# Patient Record
Sex: FEMALE | Employment: FULL TIME | URBAN - METROPOLITAN AREA
[De-identification: names, ages, dates, MRNs, and addresses within clinical notes are randomized per-mention and may not be internally consistent; named-entity substitution may affect disease eponyms.]

---

## 2017-03-01 ENCOUNTER — OFFICE VISIT (OUTPATIENT)
Dept: OBGYN | Facility: CLINIC | Age: 47
End: 2017-03-01
Attending: OBSTETRICS & GYNECOLOGY
Payer: COMMERCIAL

## 2017-03-01 VITALS
SYSTOLIC BLOOD PRESSURE: 113 MMHG | HEART RATE: 66 BPM | HEIGHT: 63 IN | BODY MASS INDEX: 23.48 KG/M2 | WEIGHT: 132.5 LBS | DIASTOLIC BLOOD PRESSURE: 70 MMHG

## 2017-03-01 DIAGNOSIS — R14.0 ABDOMINAL BLOATING: Primary | ICD-10-CM

## 2017-03-01 PROCEDURE — 99212 OFFICE O/P EST SF 10 MIN: CPT | Mod: ZF

## 2017-03-01 NOTE — PROGRESS NOTES
"[facility] Visit    CC: abdominal bloating    S: 47 year old here for evaluation of abdominal bloating and pain recently. She has a history of endometriosis and is worried about recurrence of ovarian cysts. Denies pain, just bloating and cramping. She also has some bowel irregularity, occasional loose stools, but BM daily. She has h/o Dmoxi-Aknxgtjohq-Vgetao-[place] syndrome s/p THOMAS of small uterus, had no cervix and slightly shortened vagina. Has intact ovaries but has been on vivelle dot for hot flushes for past 2 years. Changes patch every 5-7 days rather than twice a week without symptoms. Was also recently diagnosed with osteoporosis and started calcium and vitamin D.       O: /70 (BP Location: Right arm, Patient Position: Chair)  Pulse 66  Ht 1.607 m (5' 3.25\")  Wt 60.1 kg (132 lb 8 oz)  BMI 23.29 kg/m2  General: No distress  Abdomen: Soft, non-tender, non-distended, no masses  Pelvic Exam:  Vulva: No external lesions, normal hair distribution, normal architecture  Vagina: Moist, pink, no abnormal discharge, well rugated, no lesions  Adnexa: No masses palpated    A:47 year old with abdominal bloating    P: Pelvic US  Stool culture  Will start probiotics  Follow up with results      Nanette Blanton MD FACOG  "

## 2017-03-01 NOTE — MR AVS SNAPSHOT
After Visit Summary   3/1/2017    Shannan Harris    MRN: [id number]           Patient Information     Date Of Birth          1970        Visit Information        Provider Department      3/1/2017 3:00 PM Nanette Blanton MD [facility]        Today's Diagnoses     Abdominal bloating    -  1       Follow-ups after your visit        Your next 10 appointments already scheduled     Mar 03, 2017 10:30 AM CST   ULTRASOUND with [facility] ULTRASOUND   [facility] ([facility])    [facility] [address]  [address]  [address]  [facility] [id number] [phone]              Future tests that were ordered for you today     Open Future Orders        Priority Expected Expires Ordered    Ova and Parasite Exam Routine Routine  3/1/2018 3/1/2017    US GYN Complete Transvaginal - 53615 (In Clinic) Routine  6/29/2017 3/1/2017            Who to contact     Please call your clinic at [phone] to:    Ask questions about your health    Make or cancel appointments    Discuss your medicines    Learn about your test results    Speak to your doctor   If you have compliments or concerns about an experience at your clinic, or if you wish to file a complaint, please contact [facility] Patient Relations at [phone] or email us at [email or web address]         Additional Information About Your Visit        MyChart Information     [employer] gives you secure access to your electronic health record. If you see a primary care provider, you can also send messages to your care team and make appointments. If you have questions, please call your primary care clinic.  If you do not have a primary care provider, please call [phone] and they will assist you.      [employer] is an electronic gateway that provides easy, online access to your medical records. With [employer], you can request a clinic appointment, read your  "test results, renew a prescription or communicate with your care team.     To access your existing account, please contact your [facility] or call [phone] for assistance.        Care EveryWhere ID     This is your Care EveryWhere ID. This could be used by other organizations to access your [place] medical records  [id number]        Your Vitals Were     Pulse Height BMI (Body Mass Index)             66 1.607 m (5' 3.25\") 23.29 kg/m2          Blood Pressure from Last 3 Encounters:   03/01/17 113/70   12/09/16 110/67   08/11/16 92/60    Weight from Last 3 Encounters:   03/01/17 60.1 kg (132 lb 8 oz)   12/09/16 56.2 kg (124 lb)   11/11/16 55.8 kg (123 lb)               Primary Care Provider    No Ref-Primary Verified       No address on file        Thank you!     Thank you for choosing [facility]  for your care. Our goal is always to provide you with excellent care. Hearing back from our patients is one way we can continue to improve our services. Please take a few minutes to complete the written survey that you may receive in the mail after your visit with us. Thank you!             Your Updated Medication List - Protect others around you: Learn how to safely use, store and throw away your medicines at www.disposemymeds.org.          This list is accurate as of: 3/1/17  3:42 PM.  Always use your most recent med list.                   Brand Name Dispense Instructions for use    estradiol 0.05 MG/24HR BIW patch    VIVELLE-DOT    24 patch    Place 1 patch onto the skin twice a week       order for DME     1 Units    Roll-A-Bout Walker. Patient can use for 2 months         "

## 2017-03-01 NOTE — LETTER
"3/1/2017       RE: Shannan Harris  [address]  [facility] [address]     Dear Colleague,    Thank you for referring your patient, Shannan Harris, to the [facility] at [facility]. Please see a copy of my visit note below.    [facility] Visit    CC: abdominal bloating    S: 47 year old here for evaluation of abdominal bloating and pain recently. She has a history of endometriosis and is worried about recurrence of ovarian cysts. Denies pain, just bloating and cramping. She also has some bowel irregularity, occasional loose stools, but BM daily. She has h/o Zjufa-Lajqtxxxih-Blcpfo-[place]iar[place] syndrome s/p THOMAS of small uterus, had no cervix and slightly shortened vagina. Has intact ovaries but has been on vivelle dot for hot flushes for past 2 years. Changes patch every 5-7 days rather than twice a week without symptoms. Was also recently diagnosed with osteoporosis and started calcium and vitamin D.       O: /70 (BP Location: Right arm, Patient Position: Chair)  Pulse 66  Ht 1.607 m (5' 3.25\")  Wt 60.1 kg (132 lb 8 oz)  BMI 23.29 kg/m2  General: No distress  Abdomen: Soft, non-tender, non-distended, no masses  Pelvic Exam:  Vulva: No external lesions, normal hair distribution, normal architecture  Vagina: Moist, pink, no abnormal discharge, well rugated, no lesions  Adnexa: No masses palpated    A:47 year old with abdominal bloating    P: Pelvic US  Stool culture  Will start probiotics  Follow up with results      Nanette Blanton MD FACOG    "

## 2017-03-01 NOTE — NURSING NOTE
Chief Complaint   Patient presents with     Follow Up For     abd pain; hx of endometriosis       See ASHLEY Jules 3/1/2017

## 2017-03-03 ENCOUNTER — OFFICE VISIT (OUTPATIENT)
Dept: OBGYN | Facility: CLINIC | Age: 47
End: 2017-03-03
Attending: OBSTETRICS & GYNECOLOGY
Payer: COMMERCIAL

## 2017-03-03 DIAGNOSIS — R14.0 ABDOMINAL BLOATING: ICD-10-CM

## 2017-03-03 DIAGNOSIS — R14.0 ABDOMINAL BLOATING: Primary | ICD-10-CM

## 2017-03-03 PROCEDURE — 87209 SMEAR COMPLEX STAIN: CPT | Performed by: OBSTETRICS & GYNECOLOGY

## 2017-03-03 PROCEDURE — 76830 TRANSVAGINAL US NON-OB: CPT

## 2017-03-03 PROCEDURE — 87177 OVA AND PARASITES SMEARS: CPT | Performed by: OBSTETRICS & GYNECOLOGY

## 2017-03-03 NOTE — PROGRESS NOTES
47 year old female  presents for gynecologic ultrasound indicated by cramping, bloating, h/o endometriosis.  This study was done both transabdominally and transvaginally.    Uterine findings:   Presence: surgically absent      Pelvic findings:    Right Adnexa: Normal   Left Adnexa: Normal   Bladder:  Normal         Cul - de - sac fluid: None    Ovarian follicles:   Right ovary:  Not visualized       Left ovary:  1.9x2.2x1.0cm.     0 follicles      Comments:  Normal pelvic ultrasound, given patient's history.  R ovary not well visualized.    JOELLE Cespedes MD MPH

## 2017-03-03 NOTE — LETTER
3/3/2017       RE: Shannan Harris  [address]  [place] [facility] [address]     Dear Colleague,    Thank you for referring your patient, Shannan Harris, to the [facility] at [facility]. Please see a copy of my visit note below.    47 year old female  presents for gynecologic ultrasound indicated by cramping, bloating, h/o endometriosis.  This study was done both transabdominally and transvaginally.    Uterine findings:   Presence: surgically absent      Pelvic findings:    Right Adnexa: Normal   Left Adnexa: Normal   Bladder:  Normal         Cul - de - sac fluid: None    Ovarian follicles:   Right ovary:  Not visualized       Left ovary:  1.9x2.2x1.0cm.     0 follicles      Comments:  Normal pelvic ultrasound, given patient's history.  R ovary not well visualized.    Cammy Rubio RDMS  Angelika Higginbotham MD MPH      Again, thank you for allowing me to participate in the care of your patient.      Sincerely,    [facility]

## 2017-03-03 NOTE — MR AVS SNAPSHOT
After Visit Summary   3/3/2017    Shannan Harris    MRN: [id number]           Patient Information     Date Of Birth          1970        Visit Information        Provider Department      3/3/2017 10:30 AM [facility] ULTRASOUND [facility]        Today's Diagnoses     Abdominal bloating    -  1       Follow-ups after your visit        Who to contact     Please call your clinic at [phone] to:    Ask questions about your health    Make or cancel appointments    Discuss your medicines    Learn about your test results    Speak to your doctor   If you have compliments or concerns about an experience at your clinic, or if you wish to file a complaint, please contact [facility] Patient Relations at [phone] or email us at [email or web address]         Additional Information About Your Visit        MyChart Information     [employer] gives you secure access to your electronic health record. If you see a primary care provider, you can also send messages to your care team and make appointments. If you have questions, please call your primary care clinic.  If you do not have a primary care provider, please call [phone] and they will assist you.      [employer] is an electronic gateway that provides easy, online access to your medical records. With [employer] you can request a clinic appointment, read your test results, renew a prescription or communicate with your care team.     To access your existing account, please contact your [facility] or call [phone] for assistance.        Care EveryWhere ID     This is your Care EveryWhere ID. This could be used by other organizations to access your [place] medical records  [id number]         Blood Pressure from Last 3 Encounters:   03/01/17 113/70   12/09/16 110/67   08/11/16 92/60    Weight from Last 3 Encounters:   03/01/17 60.1 kg (132 lb 8 oz)   12/09/16 56.2 kg (124  lb)   11/11/16 55.8 kg (123 lb)              Today, you had the following     No orders found for display       Primary Care Provider    No Ref-Primary Verified       No address on file        Thank you!     Thank you for choosing [facility]  for your care. Our goal is always to provide you with excellent care. Hearing back from our patients is one way we can continue to improve our services. Please take a few minutes to complete the written survey that you may receive in the mail after your visit with us. Thank you!             Your Updated Medication List - Protect others around you: Learn how to safely use, store and throw away your medicines at www.disposemymeds.org.          This list is accurate as of: 3/3/17  2:29 PM.  Always use your most recent med list.                   Brand Name Dispense Instructions for use    estradiol 0.05 MG/24HR BIW patch    VIVELLE-DOT    24 patch    Place 1 patch onto the skin twice a week       order for DME     1 Units    Roll-A-Bout Walker. Patient can use for 2 months

## 2017-03-06 LAB
MICRO REPORT STATUS: ABNORMAL
O+P STL MICRO: ABNORMAL
SPECIMEN SOURCE: ABNORMAL

## 2017-05-30 ENCOUNTER — TELEPHONE (OUTPATIENT)
Dept: INTERNAL MEDICINE | Facility: CLINIC | Age: 47
End: 2017-05-30

## 2017-05-30 NOTE — TELEPHONE ENCOUNTER
**Vaccinated for measles?  YES       **Internations travel in the past 30 days: NO    Where to?  **Exposure to measles: NO    Who:    When:  **Do you work in or go to a day care center? NO  **Symptoms:      Fever YES  - How long?  - How high?  - What other symptoms along with the fever?    Rash NO  - How long?  - Unusual for you?  - Exposed to any new fabric or plants or laundry detergent etc?  - What does it look like?  - Where did it start? (concerning if started on the scalp)    Runny nose/Red eyes  NO  - How long?  - Usual for you?  - Seasonal or chemical exposure?    Sore throat YES  - How long?  **Other considerations?NONE    Recommendation:  NEG    If negative: Continue scheduling -  takes the call back    If positive: Contact XOCHILT Olivera (I.P. M-F 7-4:30, Aimee after 4:30 & W/E)  - I.P. -  [phone]  - Aimee - [phone]

## 2017-05-31 ENCOUNTER — OFFICE VISIT (OUTPATIENT)
Dept: INTERNAL MEDICINE | Facility: CLINIC | Age: 47
End: 2017-05-31

## 2017-05-31 VITALS
DIASTOLIC BLOOD PRESSURE: 67 MMHG | OXYGEN SATURATION: 97 % | BODY MASS INDEX: 22.67 KG/M2 | TEMPERATURE: 97.9 F | SYSTOLIC BLOOD PRESSURE: 109 MMHG | WEIGHT: 129 LBS | HEART RATE: 78 BPM

## 2017-05-31 DIAGNOSIS — J01.01 ACUTE RECURRENT MAXILLARY SINUSITIS: ICD-10-CM

## 2017-05-31 DIAGNOSIS — R09.81 NASAL CONGESTION: Primary | ICD-10-CM

## 2017-05-31 RX ORDER — TRIAMCINOLONE ACETONIDE 55 UG/1
2 SPRAY, METERED NASAL DAILY
Qty: 1 BOTTLE | Refills: 3 | Status: SHIPPED | OUTPATIENT
Start: 2017-05-31 | End: 2017-06-06

## 2017-05-31 ASSESSMENT — PAIN SCALES - GENERAL: PAINLEVEL: NO PAIN (0)

## 2017-05-31 NOTE — MR AVS SNAPSHOT
After Visit Summary   5/31/2017    Shannan Harris    MRN: [id number]           Patient Information     Date Of Birth          1970        Visit Information        Provider Department      5/31/2017 10:00 AM Karina Silva APRN CNP [facility]        Today's Diagnoses     Nasal congestion    -  1    Acute recurrent maxillary sinusitis           Follow-ups after your visit        Follow-up notes from your care team     Return for Physical Exam.      Your next 10 appointments already scheduled     Jun 06, 2017  1:30 PM CDT   (Arrive by 1:15 PM)   PHYSICAL with AMANDA Perez CNP   [facility] ([facility])    [address]  [address] [facility] [address] [phone]              Who to contact     Please call your clinic at [phone] to:    Ask questions about your health    Make or cancel appointments    Discuss your medicines    Learn about your test results    Speak to your doctor   If you have compliments or concerns about an experience at your clinic, or if you wish to file a complaint, please contact [facility] Patient Relations at [phone] or email us at [email or web address]         Additional Information About Your Visit        MyChart Information     [employer] gives you secure access to your electronic health record. If you see a primary care provider, you can also send messages to your care team and make appointments. If you have questions, please call your primary care clinic.  If you do not have a primary care provider, please call [phone] and they will assist you.      [employer] is an electronic gateway that provides easy, online access to your medical records. With [employer], you can request a clinic appointment, read your test results, renew a prescription or communicate with your care team.     To access your existing account, please contact your  [facility] or call [phone] for assistance.        Care EveryWhere ID     This is your Care EveryWhere ID. This could be used by other organizations to access your [place] medical records  [id number]        Your Vitals Were     Pulse Temperature Pulse Oximetry Breastfeeding? BMI (Body Mass Index)       78 97.9  F (36.6  C) (Oral) 97% No 22.67 kg/m2        Blood Pressure from Last 3 Encounters:   05/31/17 109/67   03/01/17 113/70   12/09/16 110/67    Weight from Last 3 Encounters:   05/31/17 58.5 kg (129 lb)   03/01/17 60.1 kg (132 lb 8 oz)   12/09/16 56.2 kg (124 lb)              Today, you had the following     No orders found for display         Today's Medication Changes          These changes are accurate as of: 5/31/17 10:37 AM.  If you have any questions, ask your nurse or doctor.               Start taking these medicines.        Dose/Directions    amoxicillin-clavulanate 875-125 MG per tablet   Commonly known as:  AUGMENTIN   Used for:  Acute recurrent maxillary sinusitis   Started by:  Karina Silva APRN CNP        Dose:  1 tablet   Take 1 tablet by mouth 2 times daily   Quantity:  20 tablet   Refills:  0       triamcinolone 55 MCG/ACT Inhaler   Commonly known as:  NASACORT AQ   Used for:  Nasal congestion   Started by:  Karina Silva APRN CNP        Dose:  2 spray   Spray 2 sprays into both nostrils daily   Quantity:  1 Bottle   Refills:  3            Where to get your medicines      These medications were sent to [facility] [address] [place] [address] [address] AT [facility]  [address], [facility] [address]     Phone:  [phone]     amoxicillin-clavulanate 875-125 MG per tablet    triamcinolone 55 MCG/ACT Inhaler                Primary Care Provider    No Ref-Primary Verified       No address on file        Thank you!     Thank you for choosing [facility]  for your care. Our goal is  always to provide you with excellent care. Hearing back from our patients is one way we can continue to improve our services. Please take a few minutes to complete the written survey that you may receive in the mail after your visit with us. Thank you!             Your Updated Medication List - Protect others around you: Learn how to safely use, store and throw away your medicines at www.disposemymeds.org.          This list is accurate as of: 5/31/17 10:37 AM.  Always use your most recent med list.                   Brand Name Dispense Instructions for use    amoxicillin-clavulanate 875-125 MG per tablet    AUGMENTIN    20 tablet    Take 1 tablet by mouth 2 times daily       estradiol 0.05 MG/24HR BIW patch    VIVELLE-DOT    24 patch    Place 1 patch onto the skin twice a week       order for DME     1 Units    Roll-A-Bout Walker. Patient can use for 2 months       triamcinolone 55 MCG/ACT Inhaler    NASACORT AQ    1 Bottle    Spray 2 sprays into both nostrils daily

## 2017-05-31 NOTE — NURSING NOTE
Chief Complaint   Patient presents with     URI     Patient here for cold and sore throat,     Mechelle Marinelli LPN at 10:13 AM on 5/31/2017.

## 2017-05-31 NOTE — PROGRESS NOTES
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 She was sick with similar symptoms <2 weeks ago. Takes Vitamins and Calcium, but otherwise just nasal sprays (Zicam with eucalyptus and Afrin at night).   She has body aches and chills, but no measured fever. Pressure in the sinuses, constant drainage, and sore throat is \"killing me.\" Concerned that viral illness has turned into bacterial. Started taking Cipro (left over from something a long time ago--took 500 mg then 250 mg for 2 days). Has had to use sick days this year (has not had to do this in past 15 years).   3-year-old son is in . He is not sick.  She has a hx of recurrent sore throats as a child, was hospitalized once due to complications when the infection spread into her chest. She does not remember any additional details about this.    Other issues discussed today:     Patient Active Problem List   Diagnosis     Melanocytic nevus     SK (seborrheic keratosis)     History of basal cell carcinoma     Hot flashes     Neoplasm of uncertain behavior of skin     Tinea pedis of both feet     Nondisplaced fracture of fifth left metatarsal bone with routine healing     Left foot pain       Current Outpatient Prescriptions   Medication Sig Dispense Refill     triamcinolone (NASACORT AQ) 55 MCG/ACT Inhaler Spray 2 sprays into both nostrils daily 1 Bottle 3     amoxicillin-clavulanate (AUGMENTIN) 875-125 MG per tablet Take 1 tablet by mouth 2 times daily 20 tablet 0     estradiol (VIVELLE-DOT) 0.05 MG/24HR patch Place 1 patch onto the skin twice a week 24 patch 3     order for DME Roll-A-Bout Walker. Patient can use for 2 months 1 Units 0         ALLERGIES: Nkda [no known drug allergies]    PAST MEDICAL HX:   Past Medical History:   Diagnosis Date     Hot flashes        PAST SURGICAL HX:   Past Surgical History:   Procedure " Laterality Date     BIOPSY OF SKIN LESION       HYSTERECTOMY      has ovaries       IMMUNIZATION HX:   Immunization History   Administered Date(s) Administered     Influenza (H1N1) 12/21/2009     Influenza (IIV3) 10/28/2012, 10/08/2013, 10/01/2014       SOCIAL HX:   Social History     Social History Narrative    Medical researcher at [employer]       ROS:   CONSTITUTIONAL:see HPI  EYES: no acute vision problems or changes  ENT:see HPI  RESP: no significant cough, no shortness of breath  CV: no chest pain, no palpitations, no new or worsening peripheral edema  GI: no nausea, no vomiting, no constipation, no diarrhea    OBJECTIVE:  /67  Pulse 78  Temp 97.9  F (36.6  C) (Oral)  Wt 58.5 kg (129 lb)  SpO2 97%  Breastfeeding? No  BMI 22.67 kg/m2   Wt Readings from Last 1 Encounters:   05/31/17 58.5 kg (129 lb)     Constitutional: no distress, comfortable, pleasant, well-groomed  Eyes: anicteric, conjunctiva pink, normal extra-ocular movements   Ears, Nose and Throat: tympanic membranes pearly gray with positive light reflex, EACs clear bilaterally, nares erythematous with clear drainage, maxillary sinus tenderness, tonsils 2+ and erythematous without exudates, mucosa pink and moist.   Neck: supple with full range of motion, no thyromegaly, no lymphadenopathy  Cardiovascular: regular rate and rhythm, normal S1 and S2, no murmurs, rubs or gallops  Respiratory: clear to auscultation with good air movement bilaterally, no wheezes or crackles, non-labored  Psychological: appropriate mood, demonstrates intact judgment and logical thought process      ASSESSMENT/PLAN:    1. Nasal congestion  Advised stopping Afrin due to rebound congestion with prolonged use. Advised switching to Nasacort, may alternate to every other day if too drying. She denies any seasonal allergies, though would recommend starting Zyrtec if symptoms persist.  - triamcinolone (NASACORT AQ) 55 MCG/ACT Inhaler; Spray 2 sprays into both nostrils daily   Dispense: 1 Bottle; Refill: 3    2. Acute recurrent maxillary sinusitis  Will do 10-d course Augmentin for recurrent sinus infections. Complete full course. Advised pt to dispose of any remaining Cipro, as this is not a typical treatment for sinusitis and and she only has a few tablets remaining. Work on good hand hygiene, pushing fluids, and rest.  - amoxicillin-clavulanate (AUGMENTIN) 875-125 MG per tablet; Take 1 tablet by mouth 2 times daily  Dispense: 20 tablet; Refill: 0    FOLLOW UP: If not improving or if worsening  next routine health maintenance, or as needed for any changes or concerns  AMANDA Garza CNP

## 2017-06-06 ENCOUNTER — OFFICE VISIT (OUTPATIENT)
Dept: INTERNAL MEDICINE | Facility: CLINIC | Age: 47
End: 2017-06-06

## 2017-06-06 VITALS
HEART RATE: 67 BPM | WEIGHT: 126 LBS | SYSTOLIC BLOOD PRESSURE: 114 MMHG | DIASTOLIC BLOOD PRESSURE: 69 MMHG | HEIGHT: 63 IN | BODY MASS INDEX: 22.32 KG/M2

## 2017-06-06 DIAGNOSIS — Z00.00 ROUTINE HISTORY AND PHYSICAL EXAMINATION OF ADULT: ICD-10-CM

## 2017-06-06 DIAGNOSIS — R74.01 ELEVATED ALT MEASUREMENT: ICD-10-CM

## 2017-06-06 DIAGNOSIS — Z11.3 ROUTINE SCREENING FOR STI (SEXUALLY TRANSMITTED INFECTION): Primary | ICD-10-CM

## 2017-06-06 DIAGNOSIS — Z12.31 ENCOUNTER FOR SCREENING MAMMOGRAM FOR BREAST CANCER: ICD-10-CM

## 2017-06-06 DIAGNOSIS — Z87.09 HISTORY OF DEVIATED NASAL SEPTUM: ICD-10-CM

## 2017-06-06 DIAGNOSIS — Z13.220 SCREENING FOR HYPERLIPIDEMIA: ICD-10-CM

## 2017-06-06 LAB
ALBUMIN SERPL-MCNC: 3.9 G/DL (ref 3.4–5)
ALP SERPL-CCNC: 169 U/L (ref 40–150)
ALT SERPL W P-5'-P-CCNC: 53 U/L (ref 0–50)
ANION GAP SERPL CALCULATED.3IONS-SCNC: 8 MMOL/L (ref 3–14)
AST SERPL W P-5'-P-CCNC: 29 U/L (ref 0–45)
BILIRUB SERPL-MCNC: 0.5 MG/DL (ref 0.2–1.3)
BUN SERPL-MCNC: 12 MG/DL (ref 7–30)
CALCIUM SERPL-MCNC: 9.1 MG/DL (ref 8.5–10.1)
CHLORIDE SERPL-SCNC: 105 MMOL/L (ref 94–109)
CHOLEST SERPL-MCNC: 212 MG/DL
CO2 SERPL-SCNC: 28 MMOL/L (ref 20–32)
CREAT SERPL-MCNC: 0.64 MG/DL (ref 0.52–1.04)
GFR SERPL CREATININE-BSD FRML MDRD: ABNORMAL ML/MIN/1.7M2
GLUCOSE SERPL-MCNC: 80 MG/DL (ref 70–99)
HDLC SERPL-MCNC: 86 MG/DL
HGB BLD-MCNC: 13 G/DL (ref 11.7–15.7)
LDLC SERPL CALC-MCNC: 113 MG/DL
NONHDLC SERPL-MCNC: 126 MG/DL
POTASSIUM SERPL-SCNC: 4.2 MMOL/L (ref 3.4–5.3)
PROT SERPL-MCNC: 7.4 G/DL (ref 6.8–8.8)
SODIUM SERPL-SCNC: 141 MMOL/L (ref 133–144)
TRIGL SERPL-MCNC: 66 MG/DL

## 2017-06-06 ASSESSMENT — PAIN SCALES - GENERAL: PAINLEVEL: NO PAIN (0)

## 2017-06-06 NOTE — PATIENT INSTRUCTIONS
[facility] Medication Refill Request Information:  * Please contact your pharmacy regarding ANY request for medication refills.  ** [facility] Prescription Fax = [phone]  * Please allow 3 business days for routine medication refills.  * Please allow 5 business days for controlled substance medication refills.     [facility] Test Result notification information:  *You will be notified with in 7-10 days of your appointment day regarding the results of your test.  If you are on MyChart you will be notified as soon as the provider has reviewed the results and signed off on them.    [facility] [phone]

## 2017-06-06 NOTE — NURSING NOTE
Chief Complaint   Patient presents with     Physical     patient states she is here for a physical     CARMEN VILCHIS at 1:40 PM on 6/6/2017.

## 2017-06-06 NOTE — MR AVS SNAPSHOT
After Visit Summary   6/6/2017    Shannan Harris    MRN: [id number]           Patient Information     Date Of Birth          1970        Visit Information        Provider Department      6/6/2017 1:30 PM Karina Silva APRN [facility]        Today's Diagnoses     Routine screening for STI (sexually transmitted infection)    -  1    Encounter for screening mammogram for breast cancer        Screening for hyperlipidemia        Elevated ALT measurement        Routine history and physical examination of adult          Care Instructions    Primary Care Center Medication Refill Request Information:  * Please contact your pharmacy regarding ANY request for medication refills.  ** [facility] Prescription Fax = [phone]  * Please allow 3 business days for routine medication refills.  * Please allow 5 business days for controlled substance medication refills.     Primary Care Center Test Result notification information:  *You will be notified with in 7-10 days of your appointment day regarding the results of your test.  If you are on MyChart you will be notified as soon as the provider has reviewed the results and signed off on them.    Primary Care Center [phone]             Follow-ups after your visit        Your next 10 appointments already scheduled     Jun 06, 2017  2:30 PM CDT   LAB with [facility] ([facility])    [address] [place] [id number] [phone]           Patient must bring picture ID.  Patient should be prepared to give a urine specimen  Please do not eat 10-12 hours before your appointment if you are coming in fasting for labs on lipids, cholesterol, or glucose (sugar).  Pregnant women should follow their Care Team instructions. Water with medications is okay. Do not drink coffee or other fluids.   If you have concerns about taking  your medications, please ask at office or if scheduling via  3ROAM, send a message by clicking on Secure Messaging, Message Your Care Team.              Future tests that were ordered for you today     Open Future Orders        Priority Expected Expires Ordered    Lipid panel reflex to direct LDL Routine 6/6/2017 6/20/2017 6/6/2017    Hemoglobin Routine 6/6/2017 6/6/2018 6/6/2017    Comprehensive metabolic panel Routine 6/6/2017 6/20/2017 6/6/2017    Mammogram, routine screening Routine 8/30/2017 6/6/2018 6/6/2017            Who to contact     Please call your clinic at [phone] to:    Ask questions about your health    Make or cancel appointments    Discuss your medicines    Learn about your test results    Speak to your doctor   If you have compliments or concerns about an experience at your clinic, or if you wish to file a complaint, please contact [facility] Patient Relations at [phone] or email us at [email or web address]         Additional Information About Your Visit        3ROAM Information     3ROAM gives you secure access to your electronic health record. If you see a primary care provider, you can also send messages to your care team and make appointments. If you have questions, please call your primary care clinic.  If you do not have a primary care provider, please call [phone] and they will assist you.      3ROAM is an electronic gateway that provides easy, online access to your medical records. With 3ROAM, you can request a clinic appointment, read your test results, renew a prescription or communicate with your care team.     To access your existing account, please contact your [facility] or call [phone] for assistance.        Care EveryWhere ID     This is your Care EveryWhere ID. This could be used by other organizations to access your [place] medical records  [id number]        Your Vitals Were     Pulse Height BMI (Body Mass Index)             67 1.6 m (5'  "3\") 22.32 kg/m2          Blood Pressure from Last 3 Encounters:   06/06/17 114/69   05/31/17 109/67   03/01/17 113/70    Weight from Last 3 Encounters:   06/06/17 57.2 kg (126 lb)   05/31/17 58.5 kg (129 lb)   03/01/17 60.1 kg (132 lb 8 oz)              We Performed the Following     C. trachomatis PCR - Endocervical Swab, Clinic Collect     N. gonorrhea PCR - Endocervical Swab, Clinic Collect          Today's Medication Changes          These changes are accurate as of: 6/6/17  2:19 PM.  If you have any questions, ask your nurse or doctor.               Stop taking these medicines if you haven't already. Please contact your care team if you have questions.     amoxicillin-clavulanate 875-125 MG per tablet   Commonly known as:  AUGMENTIN   Stopped by:  Karina Silva APRN CNP           order for DME   Stopped by:  Karina Silva APRN CNP           triamcinolone 55 MCG/ACT Inhaler   Commonly known as:  NASACORT AQ   Stopped by:  Karina Silva APRN CNP                    Primary Care Provider Office Phone # Fax #    AMANDA Perez [phone]       [address] [address]        Thank you!     Thank you for choosing [facility]  for your care. Our goal is always to provide you with excellent care. Hearing back from our patients is one way we can continue to improve our services. Please take a few minutes to complete the written survey that you may receive in the mail after your visit with us. Thank you!             Your Updated Medication List - Protect others around you: Learn how to safely use, store and throw away your medicines at www.disposemymeds.org.          This list is accurate as of: 6/6/17  2:19 PM.  Always use your most recent med list.                   Brand Name Dispense Instructions for use    estradiol 0.05 MG/24HR BIW patch    VIVELLE-DOT    24 patch    Place 1 patch onto the skin twice a week    "

## 2017-06-06 NOTE — PROGRESS NOTES
SUBJECTIVE:  Shannan Harris is a 47 year old female with pmh of   Patient Active Problem List   Diagnosis     Melanocytic nevus     SK (seborrheic keratosis)     History of basal cell carcinoma     Hot flashes     Neoplasm of uncertain behavior of skin     Tinea pedis of both feet     Nondisplaced fracture of fifth left metatarsal bone with routine healing     Left foot pain     who comes in for preventive care examination today.   She has the no major concerns today.    Cough improving.  Had some vaginal itching after abx for sinusitis. Improved with probiotics.    Menses:  No LMP recorded. Patient has had a hysterectomy.  Menstrual cycles are absent.   PAP HX:   Last No results found for: PAP  History of abnormal None--Never had a cervix--uterus was not connected to vagina.    Breast:  Patient performs self breast exams  No  Breast concerns Yes --some pain and intermittent lumps, fluctuates with cycle.   Ma Screening Digital Bilateral    Result Date: 8/29/2016  Narrative: Examination: Bilateral digital screening mammography with computer aided detection. Comparison: 7/16/2015, 3/28/2013, 1/20/2011 History: No symptoms, routine screening. Patient is of [place] descent. BREAST DENSITY: Heterogeneously dense COMMENTS: There has been no significant change.      Mammo Screening Digital (bilateral)    Result Date: 7/17/2015  Narrative: Examination: Bilateral digital screening mammography with computer aided detection. Comparison: Screening mammograms on 12/2/2010 and 3/28/2013. Diagnostic right mammogram on 1/20/2011. History: No symptoms, routine screening. Diagnostic mammogram of the right breast in 2011 performed for palpable lump was found to be mammographically and ultrasonically negative. Patient is of [place]ken[place] descent. BREAST DENSITY: Heterogeneously dense COMMENTS: No significant change.        Sexual Hx:  Sexually active  yes, single partner, contraception - not needed  Partner(s) are male   IS  "interested in STD testing    Pregnancy:   none      Medications and allergies reviewed by me today.     Family History   Problem Relation Age of Onset     CANCER Mother      BCC     CANCER Brother      BCC     Cancer - colorectal No family hx of      Breast Cancer No family hx of      Skin Cancer No family hx of      Melanoma No family hx of        Social History   Substance Use Topics     Smoking status: Never Smoker     Smokeless tobacco: Never Used     Alcohol use No       Immunization History   Administered Date(s) Administered     Influenza (H1N1) 12/21/2009     Influenza (IIV3) 10/28/2012, 10/08/2013, 10/01/2014         Review Of Systems  Answers for HPI/ROS submitted by the patient on 6/2/2017   General Symptoms: No  Skin Symptoms: No  HENT Symptoms: No  EYE SYMPTOMS: No  HEART SYMPTOMS: No  LUNG SYMPTOMS: No  INTESTINAL SYMPTOMS: No  URINARY SYMPTOMS: No  GYNECOLOGIC SYMPTOMS: No  BREAST SYMPTOMS: No  SKELETAL SYMPTOMS: No  BLOOD SYMPTOMS: No  NERVOUS SYSTEM SYMPTOMS: No  MENTAL HEALTH SYMPTOMS: No    OBJECTIVE:    /69  Pulse 67  Ht 1.6 m (5' 3\")  Wt 57.2 kg (126 lb)  BMI 22.32 kg/m2   Wt Readings from Last 1 Encounters:   06/06/17 57.2 kg (126 lb)     Constitutional: no distress, comfortable, pleasant   Eyes: anicteric, normal extra-ocular movements   Ears, Nose and Throat: tympanic membranes clear, nasal septum mildly deviated to the right, throat clear, neck supple with full range of motion, no thyromegaly.   Cardiovascular: regular rate and rhythm, normal S1 and S2, no murmurs, rubs or gallops, peripheral pulses full and symmetric   Respiratory: clear to auscultation, no wheezes or crackles, normal breath sounds   Gastrointestinal: positive bowel sounds, nontender, no hepatosplenomegaly, no masses   Gentiourinary: normal external genitalia,  no enlargement of the Bartholin or [place] glands, urethra normal, perineum normal and free of lesions, uterus absent, adnexa without enlargement or " tenderness  Musculoskeletal: full range of motion, no edema   Skin: no concerning lesions, no jaundice   Breast: no lumps or mass, no nipple discharge, no skin retraction  Neurological: cranial nerves intact, normal strength and sensation, reflexes at patella and biceps normal, normal gait, no tremor   Psychological: appropriate mood   Lymphatic: no cervical lymphadenopathy    ASSESSMENT/PLAN:  Pt is a 47 year old female here for preventive examination    1. Routine screening for STI (sexually transmitted infection)  Routine screening.  - C. trachomatis PCR - Endocervical Swab, Clinic Collect  - N. gonorrhea PCR - Endocervical Swab, Clinic Collect    2. Encounter for screening mammogram for breast cancer  Order for future mammogram, do be done after 8/29/17.   - Mammogram, routine screening; Future    3. Screening for hyperlipidemia  Routine screening, pt fasting today.  - Lipid panel reflex to direct LDL; Future    4. Elevated ALT measurement  ALT elevated in 2016, will recheck today.  - Comprehensive metabolic panel; Future    5. Routine history and physical examination of adult  - Hemoglobin; Future    6. History of deviated nasal septum  ENT referral for deviated septum. Pt complains of not being able to breathe out of L nostril.  - OTOLARYNGOLOGY REFERRAL    FOLLOW UP: next routine health maintenance, or as needed for any changes or concerns.    GYN TESTING:  Breast examination performed.Yes   Pelvic examination performed Yes    Pap   No If normal PAP results no paps needed.  STD testing Yes  Chlamydia and Gonorrhea    PREVENTATIVE TESTING:  Breast cancer screening  Ordered  Colorectal screening  Not indicated   Osteoporosis screening not indicated. Recommend that patient take 1200 mg calcium in divided doses and 1000 IU of vitamin D on daily basis.   Immunizations up to date.    Labs ordered Hb, CMP, Lipid panel, Chlamydia and Gonorrhea  Counseling was provided in the following areas:  regular  exercise  healthy diet/nutrition  osteoporosis prevention/bone health  safe sex practices/STD prevention  self breast exam     Karina Silva, APRN CNP

## 2017-06-07 LAB
C TRACH DNA SPEC QL NAA+PROBE: NORMAL
N GONORRHOEA DNA SPEC QL NAA+PROBE: NORMAL
SPECIMEN SOURCE: NORMAL
SPECIMEN SOURCE: NORMAL

## 2017-06-08 DIAGNOSIS — R74.8 ELEVATED LIVER ENZYMES: Primary | ICD-10-CM

## 2017-07-25 DIAGNOSIS — N95.1 MENOPAUSAL HOT FLUSHES: ICD-10-CM

## 2017-07-25 RX ORDER — ESTRADIOL 0.05 MG/D
1 PATCH, EXTENDED RELEASE TRANSDERMAL
Qty: 24 PATCH | Refills: 0 | Status: SHIPPED | OUTPATIENT
Start: 2017-07-27 | End: 2017-10-05

## 2017-07-25 NOTE — TELEPHONE ENCOUNTER
Received refill request for estradiol patches. Spoke with patient to let her know she is due for annual this August, transferred to make appt. Refilled patches to get her through until her appointment.

## 2017-08-28 ENCOUNTER — RADIANT APPOINTMENT (OUTPATIENT)
Dept: MAMMOGRAPHY | Facility: CLINIC | Age: 47
End: 2017-08-28
Attending: NURSE PRACTITIONER

## 2017-08-28 DIAGNOSIS — Z12.31 ENCOUNTER FOR SCREENING MAMMOGRAM FOR BREAST CANCER: ICD-10-CM

## 2017-08-28 DIAGNOSIS — Z12.31 VISIT FOR SCREENING MAMMOGRAM: ICD-10-CM

## 2017-09-19 ENCOUNTER — OFFICE VISIT (OUTPATIENT)
Dept: DERMATOLOGY | Facility: CLINIC | Age: 47
End: 2017-09-19

## 2017-09-19 DIAGNOSIS — B35.3 TINEA PEDIS, UNSPECIFIED LATERALITY: ICD-10-CM

## 2017-09-19 DIAGNOSIS — D48.5 NEOPLASM OF UNCERTAIN BEHAVIOR OF SKIN: Primary | ICD-10-CM

## 2017-09-19 DIAGNOSIS — Z85.828 HISTORY OF BASAL CELL CANCER: ICD-10-CM

## 2017-09-19 RX ORDER — LIDOCAINE HYDROCHLORIDE AND EPINEPHRINE 10; 10 MG/ML; UG/ML
3 INJECTION, SOLUTION INFILTRATION; PERINEURAL ONCE
Qty: 3 ML | Refills: 0 | OUTPATIENT
Start: 2017-09-19 | End: 2017-09-19

## 2017-09-19 ASSESSMENT — PAIN SCALES - GENERAL
PAINLEVEL: NO PAIN (0)
PAINLEVEL: NO PAIN (0)

## 2017-09-19 NOTE — PROGRESS NOTES
[facility] Dermatology Note      Dermatology Problem List:  1.BCC's:  -Left upper chest status post ED&C.    -Left abdomen.    -Right flank.    2. Tinea pedis  3. Moderate to severely dysplastic nevus (central upper back)  - s/p excision 8/11/2016  4. NUB left upper arm, right upper arm suspecting nodular BCCs    Encounter Date: Sep 19, 2017    CC:   FBSE  History of Present Illness:  Ms. Shannan Harris is a 47 year old female who presents as a follow-up for FBSE. The patient was last seen 5/24/2016 when a nevi was biopsied showing moderate to severe dysplasia, s/p excision.    2 spot of her concern, one on the left buttock area since June 2017. The other one on the right forearm that has antione a little more irritating in the past. No other medical disease up date.      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 The patient denies use of tanning beds.    Family History:  Grandmother, mother and brother with NMSC. No history of melanoma.    Medications:  Current Outpatient Prescriptions   Medication Sig Dispense Refill     estradiol (VIVELLE-DOT) 0.05 MG/24HR BIW patch Place 1 patch onto the skin twice a week 24 patch 0        Allergies   Allergen Reactions     Nkda [No Known Drug Allergies]        Review of Systems:  -Not pertinent to the current visit.  -Constitutional: The patient denies fatigue,  fevers, chills, unintended weight loss, and night sweats.  -HEENT: Patient denies nonhealing oral sores.  -Skin: As above in HPI. No additional skin concerns.    Physical exam:  Vitals: There were no vitals taken for this visit.  GEN: This is a well developed, well-nourished female in no acute distress, in a pleasant mood.    SKIN: Total skin excluding the undergarment areas was performed. The exam included the head/face, neck, both arms, chest, back, abdomen, both legs, digits and/or nails.     Biopsied sites  - 0.2 cm x 2 translucent lesions with arborizing vessels, on right upper arm and left upper arm  Other lesions  - 0.2 cm lesion on right upper arm, to be followed, photography obtained  -Multiple regular brown pigmented macules and papules are identified on the central and low back, R medial thigh, L lateral knee.   -Light brown flat topped papule with small uniform vessels, R ventral forearm  -Hypopigmented scars in previous BCC locations. Hypopigmented macule distal to L chest BCC site.  -Scarred area on the middle of back without pigment on dermoscopy  -Mild scale between the toes limited to left 4th- 5th digital web  -No other lesions of concern on areas examined.     Impression/Plan:  1. H/o BCCs, NERD on today's exam.  Reassurance.    2. Tinea pedis: restart terbinafine 1% cream BID to both feet for 4 weeks, then 2-3x/week  3. Benign appearing nevi, including R forearm: reassured.    4. NUB x2 at bilateral upper arm  Very small but with translucency and arborizing vessels. Biopsy one left upper and right upper arm  - The risks and benefits of the procedure were described to the patient. These include but are not limited to bleeding, infection, scar. Written informed consent was obtained. The central upper back was cleansed with an alcohol pad and injected with 2 cc 1% lidocaine with epinephrine buffered with sodium bicarbonate. Once anesthesia was obtained, a biopsy was performed with Dermablade. The  tissue was placed in a labeled container with formalin and sent to pathology. Hemostasis was achieved with aluminum chloride. Vaseline and a bandage were applied to the wound. The patient tolerated the procedure well and was given post biopsy care instructions  - call to follow up result    CC Dr. Silva on close of this encounter.  Follow-up in 1 year, earlier for new or changing lesions.      staffed the patient.    Staff Involved:  Resident(Lupe Woodward)/Staff(as above)    I have seen and examined this patient and agree with the assessment and plan as documented in the resident's note, and was present for all procedures.    Cuate Ambrocio MD  Dermatology Attending

## 2017-09-19 NOTE — MR AVS SNAPSHOT
After Visit Summary   9/19/2017    Shannan Harris    MRN: [id number]           Patient Information     Date Of Birth          1970        Visit Information        Provider Department      9/19/2017 3:15 PM Cuate Ambrocio MD [facility] Dermatology        Today's Diagnoses     Neoplasm of uncertain behavior of skin    -  1      Care Instructions    Wound Care After a Biopsy    What is a skin biopsy?  A skin biopsy allows the doctor to examine a very small piece of tissue under the microscope to determine the diagnosis and the best treatment for the skin condition. A local anesthetic (numbing medicine)  is injected with a very small needle into the skin area to be tested. A small piece of skin is taken from the area. Sometimes a suture (stitch) is used.     What are the risks of a skin biopsy?  I will experience scar, bleeding, swelling, pain, crusting and redness. I may experience incomplete removal or recurrence. Risks of this procedure are excessive bleeding, bruising, infection, nerve damage, numbness, thick (hypertrophic or keloidal) scar and non-diagnostic biopsy.    How should I care for my wound for the first 24 hours?    Keep the wound dry and covered for 24 hours    If it bleeds, hold direct pressure on the area for 15 minutes. If bleeding does not stop then go to the emergency room    Avoid strenuous exercise the first 1-2 days or as your doctor instructs you    How should I care for the wound after 24 hours?    After 24 hours, remove the bandage    You may bathe or shower as normal    If you had a scalp biopsy, you can shampoo as usual and can use shower water to clean the biopsy site daily    Clean the wound twice a day with gentle soap and water    Do not scrub, be gentle    Apply white petroleum/Vaseline after cleaning the wound with a cotton swab or a clean finger, and keep the site covered with a Bandaid /bandage. Bandages are not necessary with a scalp biopsy    If you are unable to  cover the site with a Bandaid /bandage, re-apply ointment 2-3 times a day to keep the site moist. Moisture will help with healing    Avoid strenuous activity for first 1-2 days    Avoid lakes, rivers, pools, and oceans until the stitches are removed or the site is healed    How do I clean my wound?    Wash hands thoroughly with soap or use hand  before all wound care    Clean the wound with gentle soap and water    Apply white petroleum/Vaseline  to wound after it is clean    Replace the Bandaid /bandage to keep the wound covered for the first few days or as instructed by your doctor    If you had a scalp biopsy, warm shower water to the area on a daily basis should suffice    What should I use to clean my wound?     Cotton-tipped applicators (Qtips )    White petroleum jelly (Vaseline ). Use a clean new container and use Q-tips to apply.    Bandaids   as needed    Gentle soap     How should I care for my wound long term?    Do not get your wound dirty    Keep up with wound care for one week or until the area is healed.    A small scab will form and fall off by itself when the area is completely healed. The area will be red and will become pink in color as it heals. Sun protection is very important for how your scar will turn out. Sunscreen with an SPF 30 or greater is recommended once the area is healed.    If you have stitches, stitches need to be removed in 7-14 days. You may return to our clinic for this or you may have it done locally at your doctor s office.    You should have some soreness but it should be mild and slowly go away over several days. Talk to your doctor about using tylenol for pain,    When should I call my doctor?  If you have increased:     Pain or swelling    Pus or drainage (clear or slightly yellow drainage is ok)    Temperature over 100F    Spreading redness or warmth around wound    When will I hear about my results?  The biopsy results can take 2-3 weeks to come back. The  clinic will call you with the results, send you a [employer] message, or have you schedule a follow-up clinic or phone time to discuss the results. Contact our clinics if you do not hear from us in 3 weeks.     Who should I call with questions?    [facility]: [phone]     [facility]: [phone]    For urgent needs outside of business hours call the [facility] at [phone] and ask for the dermatology resident on call              Follow-ups after your visit        Follow-up notes from your care team     Return in about 1 year (around 9/19/2018).      Your next 10 appointments already scheduled     Oct 05, 2017  1:00 PM CDT   Annual Visit with Nanette Blanton MD   [facility] ([facility])    [facility] [address]  [address]  [facility] [address] [phone]              Who to contact     Please call your clinic at [phone] to:    Ask questions about your health    Make or cancel appointments    Discuss your medicines    Learn about your test results    Speak to your doctor   If you have compliments or concerns about an experience at your clinic, or if you wish to file a complaint, please contact [facility] Patient Relations at [phone] or email us at [email or web address]         Additional Information About Your Visit        "[employer]hart Information     [employer] gives you secure access to your electronic health record. If you see a primary care provider, you can also send messages to your care team and make appointments. If you have questions, please call your primary care clinic.  If you do not have a primary care provider, please call [phone] and they will assist you.      [employer] is an electronic gateway that provides easy, online access to your medical records. With [employer], you can request a clinic appointment, read  your test results, renew a prescription or communicate with your care team.     To access your existing account, please contact your [facility] or call [phone] for assistance.        Care EveryWhere ID     This is your Care EveryWhere ID. This could be used by other organizations to access your [place] medical records  [id number]         Blood Pressure from Last 3 Encounters:   06/06/17 114/69   05/31/17 109/67   03/01/17 113/70    Weight from Last 3 Encounters:   06/06/17 57.2 kg (126 lb)   05/31/17 58.5 kg (129 lb)   03/01/17 60.1 kg (132 lb 8 oz)              We Performed the Following     BIOPSY SKIN/SUBQ/MUC MEM, EACH ADDTL LESION     BIOPSY SKIN/SUBQ/MUC MEM, SINGLE LESION     Surgical pathology exam          Today's Medication Changes          These changes are accurate as of: 9/19/17  4:33 PM.  If you have any questions, ask your nurse or doctor.               Start taking these medicines.        Dose/Directions    lidocaine 1% with EPINEPHrine 1:100,000 1 %-1:668292 injection   Used for:  Neoplasm of uncertain behavior of skin   Started by:  Cuate Ambrocio MD        Dose:  3 mL   Inject 3 mLs into the skin once for 1 dose   Quantity:  3 mL   Refills:  0            Where to get your medicines      Some of these will need a paper prescription and others can be bought over the counter.  Ask your nurse if you have questions.     You don't need a prescription for these medications     lidocaine 1% with EPINEPHrine 1:100,000 1 %-1:633649 injection                Primary Care Provider Office Phone # Fax #    Karina Tonya Silva, APRN [facility] [phone]        [facility] [address]        Equal Access to Services     [facility]isVICTOR HUGO : Hadii radha Beltre, waharrisda luqadaha, qaybta kaalmada mynor, nolberto huntley. So [facility] [phone].    ATENCIÓN: Si habla español, tiene a hicks disposición servicios gratuitos de  asistencia lingüística. Ken al [phone].    We comply with applicable federal civil rights laws and [place] laws. We do not discriminate on the basis of race, color, national origin, age, disability sex, sexual orientation or gender identity.            Thank you!     Thank you for choosing [facility] DERMATOLOGY  for your care. Our goal is always to provide you with excellent care. Hearing back from our patients is one way we can continue to improve our services. Please take a few minutes to complete the written survey that you may receive in the mail after your visit with us. Thank you!             Your Updated Medication List - Protect others around you: Learn how to safely use, store and throw away your medicines at www.disposemymeds.org.          This list is accurate as of: 9/19/17  4:33 PM.  Always use your most recent med list.                   Brand Name Dispense Instructions for use Diagnosis    estradiol 0.05 MG/24HR BIW patch    VIVELLE-DOT    24 patch    Place 1 patch onto the skin twice a week    Menopausal hot flushes       lidocaine 1% with EPINEPHrine 1:100,000 1 %-1:192945 injection     3 mL    Inject 3 mLs into the skin once for 1 dose    Neoplasm of uncertain behavior of skin

## 2017-09-19 NOTE — PATIENT INSTRUCTIONS

## 2017-09-19 NOTE — NURSING NOTE
Dermatology Rooming Note    Shannan Harris's goals for this visit include:   Chief Complaint   Patient presents with     Skin Check     Shannan has a few spots of concern today. Personal hx of BCC.     Jacque Harp, CMA

## 2017-09-19 NOTE — LETTER
9/19/2017       RE: Shannan Harris  [address]  [place] [address]     Dear Colleague,    Thank you for referring your patient, Shannan Harris, to the [facility] DER[facility]OLOG[facility] at [facility]. Please see a copy of my visit note below.    [facility] Dermatology Note      Dermatology Problem List:  1.BCC's:  -Left upper chest status post ED&C.    -Left abdomen.    -Right flank.    2. Tinea pedis  3. Moderate to severely dysplastic nevus (central upper back)  - s/p excision 8/11/2016  4. NUB left upper arm, right upper arm suspecting nodular BCCs    Encounter Date: Sep 19, 2017    CC:   FBSE  History of Present Illness:  Ms. Shannan Harris is a 47 year old female who presents as a follow-up for FBSE. The patient was last seen 5/24/2016 when a nevi was biopsied showing moderate to severe dysplasia, s/p excision.    2 spot of her concern, one on the left buttock area since June 2017. The other one on the right forearm that has antione a little more irritating in the past. No other medical disease up date.      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 The patient denies use of tanning beds.    Family History:  Grandmother, mother and brother with NMSC. No history of melanoma.    Medications:  Current Outpatient Prescriptions   Medication Sig Dispense Refill     estradiol  (VIVELLE-DOT) 0.05 MG/24HR BIW patch Place 1 patch onto the skin twice a week 24 patch 0        Allergies   Allergen Reactions     Nkda [No Known Drug Allergies]        Review of Systems:  -Not pertinent to the current visit.  -Constitutional: The patient denies fatigue, fevers, chills, unintended weight loss, and night sweats.  -HEENT: Patient denies nonhealing oral sores.  -Skin: As above in HPI. No additional skin concerns.  Physical exam:  Vitals: There were no vitals taken for this visit.  GEN: This is a well developed, well-nourished female in no acute distress, in a pleasant mood.    SKIN: Total skin excluding the undergarment areas was performed. The exam included the head/face, neck, both arms, chest, back, abdomen, both legs, digits and/or nails.     Biopsied sites  - 0.2 cm x 2 translucent lesions with arborizing vessels, on right upper arm and left upper arm  Other lesions  - 0.2 cm lesion on right upper arm, to be followed, photography obtained  -Multiple regular brown pigmented macules and papules are identified on the central and low back, R medial thigh, L lateral knee.   -Light brown flat topped papule with small uniform vessels, R ventral forearm  -Hypopigmented scars in previous BCC locations. Hypopigmented macule distal to L chest BCC site.  -Scarred area on the middle of back without pigment on dermoscopy  -Mild scale between the toes limited to left 4th- 5th digital web  -No other lesions of concern on areas examined.     Impression/Plan:  1. H/o BCCs, NERD on today's exam.  Reassurance.    2. Tinea pedis: restart terbinafine 1% cream BID to both feet for 4 weeks, then 2-3x/week  3. Benign appearing nevi, including R forearm: reassured.    4. NUB x2 at bilateral upper arm  Very small but with translucency and arborizing vessels. Biopsy one left upper and right upper arm  - The risks and benefits of the procedure were described to the patient. These include but are not limited to bleeding,  infection, scar. Written informed consent was obtained. The central upper back was cleansed with an alcohol pad and injected with 2 cc 1% lidocaine with epinephrine buffered with sodium bicarbonate. Once anesthesia was obtained, a biopsy was performed with Dermablade. The tissue was placed in a labeled container with formalin and sent to pathology. Hemostasis was achieved with aluminum chloride. Vaseline and a bandage were applied to the wound. The patient tolerated the procedure well and was given post biopsy care instructions  - call to follow up result    CC Dr. Silva on close of this encounter.  Follow-up in 1 year, earlier for new or changing lesions.      staffed the patient.    Staff Involved:  Resident(Lupe Woodward)/Staff(as above)    I have seen and examined this patient and agree with the assessment and plan as documented in the resident's note, and was present for all procedures.    Cuate Ambrocio MD  Dermatology Attending                    Again, thank you for allowing me to participate in the care of your patient.      Sincerely,    Cuate Ambrocio MD

## 2017-09-25 LAB — COPATH REPORT: NORMAL

## 2017-10-05 ENCOUNTER — OFFICE VISIT (OUTPATIENT)
Dept: OBGYN | Facility: CLINIC | Age: 47
End: 2017-10-05
Attending: OBSTETRICS & GYNECOLOGY
Payer: COMMERCIAL

## 2017-10-05 VITALS
DIASTOLIC BLOOD PRESSURE: 63 MMHG | BODY MASS INDEX: 22.68 KG/M2 | HEIGHT: 63 IN | SYSTOLIC BLOOD PRESSURE: 99 MMHG | HEART RATE: 68 BPM | WEIGHT: 128 LBS

## 2017-10-05 DIAGNOSIS — Z01.419 ENCOUNTER FOR GYNECOLOGICAL EXAMINATION WITHOUT ABNORMAL FINDING: Primary | ICD-10-CM

## 2017-10-05 DIAGNOSIS — Z23 NEED FOR PROPHYLACTIC VACCINATION AND INOCULATION AGAINST INFLUENZA: ICD-10-CM

## 2017-10-05 DIAGNOSIS — N95.1 MENOPAUSAL HOT FLUSHES: ICD-10-CM

## 2017-10-05 PROCEDURE — 99212 OFFICE O/P EST SF 10 MIN: CPT | Mod: ZF

## 2017-10-05 PROCEDURE — G0008 ADMIN INFLUENZA VIRUS VAC: HCPCS | Mod: ZF

## 2017-10-05 PROCEDURE — 90686 IIV4 VACC NO PRSV 0.5 ML IM: CPT | Mod: ZF

## 2017-10-05 PROCEDURE — 25000128 H RX IP 250 OP 636: Mod: ZF

## 2017-10-05 RX ORDER — ESTRADIOL 0.05 MG/D
1 PATCH, EXTENDED RELEASE TRANSDERMAL
Qty: 24 PATCH | Refills: 3 | Status: SHIPPED | OUTPATIENT
Start: 2017-10-05 | End: 2018-10-25

## 2017-10-05 NOTE — MR AVS SNAPSHOT
After Visit Summary   10/5/2017    Shannan Harris    MRN: [id number]           Patient Information     Date Of Birth          1970        Visit Information        Provider Department      10/5/2017 1:00 PM Nanette Blanton MD [facility]        Today's Diagnoses     Encounter for gynecological examination without abnormal finding    -  1    Need for prophylactic vaccination and inoculation against influenza        Menopausal hot flushes           Follow-ups after your visit        Follow-up notes from your care team     Return in 1 year (on 10/5/2018) for Preventative Health Visit.      Who to contact     Please call your clinic at [phone] to:    Ask questions about your health    Make or cancel appointments    Discuss your medicines    Learn about your test results    Speak to your doctor   If you have compliments or concerns about an experience at your clinic, or if you wish to file a complaint, please contact [facility] Patient Relations at [phone] or email us at [email or web address]         Additional Information About Your Visit        MyChart Information     [employer] gives you secure access to your electronic health record. If you see a primary care provider, you can also send messages to your care team and make appointments. If you have questions, please call your primary care clinic.  If you do not have a primary care provider, please call [phone] and they will assist you.      [employer] is an electronic gateway that provides easy, online access to your medical records. With [employer], you can request a clinic appointment, read your test results, renew a prescription or communicate with your care team.     To access your existing account, please contact your [facility] or call [phone] for assistance.        Care EveryWhere ID     This is your Care EveryWhere ID. This could be used  "by other organizations to access your [place] medical records  [id number]        Your Vitals Were     Pulse Height BMI (Body Mass Index)             68 1.6 m (5' 3\") 22.67 kg/m2          Blood Pressure from Last 3 Encounters:   10/05/17 99/63   06/06/17 114/69   05/31/17 109/67    Weight from Last 3 Encounters:   10/05/17 58.1 kg (128 lb)   06/06/17 57.2 kg (126 lb)   05/31/17 58.5 kg (129 lb)              We Performed the Following     HC FLU VAC PRESRV FREE QUAD SPLIT VIR 3+YRS IM     Pelvic and Breast Exam Procedure []          Where to get your medicines      These medications were sent to [facility] [address] AT [facility]  [address] [place] , [facility] [address]     Phone:  [phone]     estradiol 0.05 MG/24HR BIW patch          Primary Care Provider Office Phone # Fax #    Karina Tonya Silva, APRN [facility] [phone]       [address] [facility] [address]        Equal Access to Services     KHAI DINH AH: Hadii aad ku hadasho Soomaali, waaxda luqadaha, qaybta kaalmada adeegyada, waxnakita schafferin hayeneidan ron rod ah. So [facility] [phone].    ATENCIÓN: Si habla español, tiene a hicks disposición servicios gratuitos de asistencia lingüística. [facility] [phone].    We comply with applicable federal civil rights laws and [place] laws. We do not discriminate on the basis of race, color, national origin, age, disability, sex, sexual orientation, or gender identity.            Thank you!     Thank you for choosing [facility]  for your care. Our goal is always to provide you with excellent care. Hearing back from our patients is one way we can continue to improve our services. Please take a few minutes to complete the written survey that you may receive in the mail after your visit with us. Thank you!             Your Updated Medication List - Protect others around you: Learn how to safely use, " store and throw away your medicines at www.disposemymeds.org.          This list is accurate as of: 10/5/17  9:59 PM.  Always use your most recent med list.                   Brand Name Dispense Instructions for use Diagnosis    estradiol 0.05 MG/24HR BIW patch    VIVELLE-DOT    24 patch    Place 1 patch onto the skin twice a week    Menopausal hot flushes

## 2017-10-05 NOTE — PROGRESS NOTES
Progress Note    SUBJECTIVE:  Shannan Harris is an 47 year old, who requests a breast and pelvic exam.    Patient is followed by Dr. Silva for primary care.    Concerns today include: Refill HRT. 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 Symptoms well controlled on vivelle dot.     Worried about length of time on HRT as well as need for DEXA. Had fracture last year, DEXA performed in  with osteoporosis. Taking calcium/vitamin d when remembers.     Mammogram current: yes    HISTORY:  Prescription Medications as of 10/5/2017             estradiol (VIVELLE-DOT) 0.05 MG/24HR BIW patch Place 1 patch onto the skin twice a week        Allergies   Allergen Reactions     Nkda [No Known Drug Allergies]      Immunization History   Administered Date(s) Administered     Influenza (H1N1) 2009     Influenza (IIV3) 10/28/2012, 10/08/2013, 10/01/2014     Influenza Vaccine IM 3yrs+ 4 Valent IIV4 10/05/2017       Obstetric History       T0      L0     SAB0   TAB0   Ectopic0   Multiple0   Live Births0      Past Medical History:   Diagnosis Date     Hot flashes      Past Surgical History:   Procedure Laterality Date     BIOPSY OF SKIN LESION       HYSTERECTOMY      for uterine myoma and painful periods; has ovaries     Family History   Problem Relation Age of Onset     CANCER Mother      BCC     CANCER Brother      BCC     Cancer - colorectal No family hx of      Breast Cancer No family hx of      Skin Cancer No family hx of      Melanoma No family hx of      Social History     Social History     Marital status: Significant other     Spouse name: N/A     Number of children: 0     Years of education: N/A     Occupational History     research [employer]     Social History Main Topics     Smoking status: Never Smoker     Smokeless tobacco: Never Used     Alcohol use No     Drug use: No     Sexual activity: Yes     Partners: Male     Birth control/ protection: Surgical       "Comment: hyst     Other Topics Concern     None     Social History Narrative    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    One son, born by surrogacy in [place] in 2014.    Getting  in June 2017.       ROS    EXAM:  Blood pressure 99/63, pulse 68, height 1.6 m (5' 3\"), weight 58.1 kg (128 lb), not currently breastfeeding. Body mass index is 22.67 kg/(m^2).  General appearance: Pleasant female in no acute distress.     BREAST EXAM:  Breast: Without visible skin changes. No dimpling or lesions seen.   Breasts supple, non-tender with palpation, no dominant mass, nodularity, or nipple discharge noted bilaterally. Axillary nodes negative.      PELVIC EXAM:  EG/BUS: Normal genital architecture without lesions, erythema or abnormal secretions Bartholin's, Urethra, [place]'s normal   Urethral meatus: normal   Urethra: no masses, tenderness, or scarring   Bladder: no masses or tenderness   Vagina: moist, pink, rugae with creamy, white and odorless  secretions  Cervix: blind vagina  Uterus: surgically absent  Adnexa: Surgically absent  Rectum:anus normal       ASSESSMENT:  Encounter Diagnoses   Name Primary?     Need for prophylactic vaccination and inoculation against influenza      Encounter for gynecological examination without abnormal finding Yes     Menopausal hot flushes       47 year old Female Pelvic and Breast Exam  HRT Surveillance    PLAN:   Orders Placed This Encounter   Procedures     Pelvic and Breast Exam Procedure []     HC FLU VAC PRESRV FREE QUAD SPLIT VIR 3+YRS IM   Plan for repeat DEXA in 2018  Reviewed use of vivelle dot, would recommend continuing at this point due to symptoms, osteoporosis at current age.     Return in one year/PRN for preventive care or problems/concerns.     Verbalized understanding and agreement with visit plan.    Nanette Blanton MD    "

## 2017-10-05 NOTE — LETTER
10/5/2017       RE: Shannan Harris  [address]  [facility] [address]     Dear Colleague,    Thank you for referring your patient, Shannan Harris, to the [facility] at [facility]. Please see a copy of my visit note below.      Progress Note    SUBJECTIVE:  Shannan Harris is an 47 year old, who requests a breast and pelvic exam.    Patient is followed by Dr. Silva for primary care.    Concerns today include: Refill HRT. 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 Symptoms well controlled on vivelle dot.     Worried about length of time on HRT as well as need for DEXA. Had fracture last year, DEXA performed in  with osteoporosis. Taking calcium/vitamin d when remembers.     Mammogram current: yes    HISTORY:  Prescription Medications as of 10/5/2017             estradiol (VIVELLE-DOT) 0.05 MG/24HR BIW patch Place 1 patch onto the skin twice a week        Allergies   Allergen Reactions     Nkda [No Known Drug Allergies]      Immunization History   Administered Date(s) Administered     Influenza (H1N1) 2009     Influenza (IIV3) 10/28/2012, 10/08/2013, 10/01/2014     Influenza Vaccine IM 3yrs+ 4 Valent IIV4 10/05/2017       Obstetric History       T0      L0     SAB0   TAB0   Ectopic0   Multiple0   Live Births0      Past Medical History:   Diagnosis Date     Hot flashes      Past Surgical History:   Procedure Laterality Date     BIOPSY OF SKIN LESION       HYSTERECTOMY      for uterine myoma and painful periods; has ovaries     Family History   Problem Relation Age of Onset     CANCER Mother      BCC     CANCER Brother      BCC     Cancer - colorectal No family hx of      Breast Cancer No family hx of      Skin Cancer No family hx of      Melanoma No family hx of      Social History     Social History     Marital status: Significant other     Spouse name: N/A     Number of children: 0     Years of education: N/A  "    Occupational History     research [employer]     Social History Main Topics     Smoking status: Never Smoker     Smokeless tobacco: Never Used     Alcohol use No     Drug use: No     Sexual activity: Yes     Partners: Male     Birth control/ protection: Surgical      Comment: hyst     Other Topics Concern     None     Social History Narrative    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    One son, born by surrogacy in [place] in 2014.    Getting  in June 2017.       ROS    EXAM:  Blood pressure 99/63, pulse 68, height 1.6 m (5' 3\"), weight 58.1 kg (128 lb), not currently breastfeeding. Body mass index is 22.67 kg/(m^2).  General appearance: Pleasant female in no acute distress.     BREAST EXAM:  Breast: Without visible skin changes. No dimpling or lesions seen.   Breasts supple, non-tender with palpation, no dominant mass, nodularity, or nipple discharge noted bilaterally. Axillary nodes negative.      PELVIC EXAM:  EG/BUS: Normal genital architecture without lesions, erythema or abnormal secretions Bartholin's, Urethra, [place]'s normal   Urethral meatus: normal   Urethra: no masses, tenderness, or scarring   Bladder: no masses or tenderness   Vagina: moist, pink, rugae with creamy, white and odorless  secretions  Cervix: blind vagina  Uterus: surgically absent  Adnexa: Surgically absent  Rectum:anus normal       ASSESSMENT:  Encounter Diagnoses   Name Primary?     Need for prophylactic vaccination and inoculation against influenza      Encounter for gynecological examination without abnormal finding Yes     Menopausal hot flushes       47 year old Female Pelvic and Breast Exam  HRT Surveillance    PLAN:   Orders Placed This Encounter   Procedures     Pelvic and Breast Exam Procedure []     HC FLU VAC PRESRV FREE QUAD SPLIT VIR 3+YRS IM   Plan for repeat DEXA in 2018  Reviewed use of sita dot, would recommend continuing at this point due to symptoms, osteoporosis at current age.     Return in " one year/PRN for preventive care or problems/concerns.     Verbalized understanding and agreement with visit plan.      Again, thank you for allowing me to participate in the care of your patient.      Sincerely,    Nanette Blanton MD

## 2017-10-11 PROBLEM — Z85.828 HISTORY OF BASAL CELL CANCER: Status: ACTIVE | Noted: 2017-10-11

## 2017-10-11 PROBLEM — B35.3 TINEA PEDIS, UNSPECIFIED LATERALITY: Status: ACTIVE | Noted: 2017-10-11

## 2017-11-03 ENCOUNTER — PRE VISIT (OUTPATIENT)
Dept: OTOLARYNGOLOGY | Facility: CLINIC | Age: 47
End: 2017-11-03

## 2017-11-03 NOTE — TELEPHONE ENCOUNTER
APPT INFO    Date /Time: 11/14/17 at 1PM   Reason for Appt: Hx of deviated septum, issues with breathing   Ref Provider/Clinic: Karina Silva @ [facility]   Are there internal records? If yes, list: [facility]   Patient Contact (Y/N) & Call Details: No, referred   Action:

## 2017-11-14 ENCOUNTER — OFFICE VISIT (OUTPATIENT)
Dept: OTOLARYNGOLOGY | Facility: CLINIC | Age: 47
End: 2017-11-14

## 2017-11-14 VITALS — BODY MASS INDEX: 22.15 KG/M2 | HEIGHT: 63 IN | WEIGHT: 125 LBS

## 2017-11-14 DIAGNOSIS — J34.89 NASAL VALVE STENOSIS: ICD-10-CM

## 2017-11-14 DIAGNOSIS — H91.90 DECREASED HEARING, UNSPECIFIED LATERALITY: Primary | ICD-10-CM

## 2017-11-14 ASSESSMENT — PAIN SCALES - GENERAL: PAINLEVEL: NO PAIN (0)

## 2017-11-14 NOTE — PATIENT INSTRUCTIONS
1.  You were seen in the ENT Clinic today by Dr. Andrade.  If you have any questions or concerns after your appointment, please call [phone].  Press option #1 for scheduling related needs.  Press option #3 for Nurse advice.  2.  Plan is to obtain a hearing test and return to clinic to discuss the results with Dr. Andrade.  3.  Referral placed to Dr. Jenn Peterson to discuss the possibility of surgery further.

## 2017-11-14 NOTE — LETTER
2017       RE: Shannan Harris  [address]  [facility] [address]     Dear Colleague,    Thank you for referring your patient, Shannan Harris, to the [facility] EAR NOSE AND THROAT at [facility]. Please see a copy of my visit note below.    Otolaryngology Adult Consultation    Patient: Shannan Harris  : 1970      HPI:  Shannan Harris is a 47 year old female seen today in the Otolaryngology Clinic for difficulty breathing through her nose.  The patient reports that when she was in her early 20s, she sustained nasal trauma and ever since then, she has not really been able to breathe well through the right side of her nose.  The trauma occurred when her dog jumped into bed and head butted her.  The change in her breathing was quite acute initially.  It was very difficult for her to get used to it, but over time she did grow accustomed to it but has always noted that the right side of her nose does not seem to move very much air.  When she sleeps at night or gets a cold, things get much worse for her.  She has had occasional nosebleeds but nothing significant.  She has not really found anything that seems to improve her nasal breathing significantly.  She also feels like her hearing is not as good recently.  Her mother has a history of hearing loss.         Medications:  Current Outpatient Rx   Medication Sig Dispense Refill     estradiol (VIVELLE-DOT) 0.05 MG/24HR BIW patch Place 1 patch onto the skin twice a week 24 patch 3       Allergies: Nkda [no known drug allergies]     PMH:  Past Medical History:   Diagnosis Date     Hot flashes        PSH:  Past Surgical History:   Procedure Laterality Date     BIOPSY OF SKIN LESION       HYSTERECTOMY      for uterine myoma and painful periods; has ovaries       FH:  Family History   Problem Relation Age of Onset     CANCER Mother      BCC     CANCER Brother      BCC     Cancer - colorectal No family hx of      Breast Cancer  No family hx of      Skin Cancer No family hx of      Melanoma No family hx of         SH:  Social History   Substance Use Topics     Smoking status: Never Smoker     Smokeless tobacco: Never Used     Alcohol use No       Review of Systems  No flowsheet data found.    Physical Exam:    GEN:  The patient is alert, oriented and in no acute distress.  HEAD:  Head, face scalp is grossly normal.  EARS:  Ears demonstrate normal canals, auricles and tympanic membranes.  NOSE: There is a subtle deviation of the bones.  Internally, the patient has just a mild septal deviation to the right.  With sniff testing, there is a definite audible difference between how she breathes through the left versus the right side.   When she sniffs through her right side only, I can barely hear any airflow.  She does have a very narrow internal nasal valve.  She has improved breathing with both Bibb maneuver as well as with internal nasal valve stenting with a Q-tip.  Also, just using a nasal speculum to widen the upper portion of her nostril does also improve her breathing.  This is a very significant change for the patient and she was quite delighted by it.       Assessment/Plan:  The patient presents with difficulty breathing through her right side.  Intranasally, her septal deviation does not at first glance appear to be significant, and she also has thin turbinates bilaterally.  I do think she has issues with her internal nasal valve.  I would recommend that she be evaluated by our facial plastic surgeon, Dr. Rita Jacobo, to see if a functional rhinoplasty and septoplasty would be of benefit for her.  The patient recognizes that this is not an issue that would necessarily cause her harm but it does really greatly affect her quality of life.  She is interested in learning more about what is involved in the surgery and that will help her make her decision as to whether or not this is something that she would like to pursue.  In the  meantime, I do not see any evidence of middle ear fluid or infection.  We will set her up for an audiogram and follow up with me to discuss the results.  A referral for Dr. Jacobo was made today.       I spent a total of 35 minutes face-to-face with Shannan Harris during today's office visit.  Over 50% of this time was spent counseling the patient on and/or coordinating care as documented in my assessment and plan.        Again, thank you for allowing me to participate in the care of your patient.      Sincerely,    Chanelle Andrade MD

## 2017-11-14 NOTE — NURSING NOTE
Chief Complaint   Patient presents with     Consult     Hx of deviated septum     Gwen Reynolds Medical Assistant

## 2017-11-14 NOTE — MR AVS SNAPSHOT
After Visit Summary   11/14/2017    Shannan Harris    MRN: [id number]           Patient Information     Date Of Birth          1970        Visit Information        Provider Department      11/14/2017 1:00 PM Chanelle Andrade MD M [facility] Ear Nose and Throat        Today's Diagnoses     Decreased hearing    -  1    Nasal valve stenosis          Care Instructions    1.  You were seen in the ENT Clinic today by Dr. Andrade.  If you have any questions or concerns after your appointment, please call [phone].  Press option #1 for scheduling related needs.  Press option #3 for Nurse advice.  2.  Plan is to obtain a hearing test and return to clinic to discuss the results with Dr. Andrade.  3.  Referral placed to Dr. Jenn Peterson to discuss the possibility of surgery further.              Follow-ups after your visit        Additional Services     OTOLARYNGOLOGY REFERRAL       Your provider has referred you to: Dr. Bebeto Peterson to discuss a surgery.    Please be aware that coverage of these services is subject to the terms and limitations of your health insurance plan.  Call member services at your health plan with any benefit or coverage questions.      Please bring the following with you to your appointment:    (1) Any X-Rays, CTs or MRIs which have been performed.  Contact the facility where they were done to arrange for  prior to your scheduled appointment.   (2) List of current medications  (3) This referral request   (4) Any documents/labs given to you for this referral                  Your next 10 appointments already scheduled     Dec 08, 2017  3:30 PM CST   Walk In From ENT with Bello Mckeon [facility] Audiology ([facility])    [address] [place] [id number] [phone]            Dec 08, 2017  4:30 PM CST   (Arrive by 4:15 PM)   Return Visit with MD IZABELLA Hinson [facility] Ear Nose and Throat (  [facility])    [address] [facility] [address] [phone]            Jan 24, 2018  5:20 PM CST   (Arrive by 5:05 PM)   New Patient Visit with Jenn Jacobo MD   [facility] Ear Nose and Throat ([facility])    [address]  [address] [facility] [id number] [phone]              Future tests that were ordered for you today     Open Future Orders        Priority Expected Expires Ordered    Hearing Test, Comprehensive (Audiogram) ([address]) Routine  5/13/2018 11/14/2017            Who to contact     Please call your clinic at [phone] to:    Ask questions about your health    Make or cancel appointments    Discuss your medicines    Learn about your test results    Speak to your doctor   If you have compliments or concerns about an experience at your clinic, or if you wish to file a complaint, please contact [facility] Patient Relations at [phone] or email us at [email or web address]         Additional Information About Your Visit        MyChart Information     [employer] gives you secure access to your electronic health record. If you see a primary care provider, you can also send messages to your care team and make appointments. If you have questions, please call your primary care clinic.  If you do not have a primary care provider, please call [phone] and they will assist you.      [employer] is an electronic gateway that provides easy, online access to your medical records. With [employer], you can request a clinic appointment, read your test results, renew a prescription or communicate with your care team.     To access your existing account, please contact your [facility] or call [phone] for assistance.        Care EveryWhere ID     This is your Care EveryWhere ID. This could be used by other organizations to access your [facility]  "records  [id number]        Your Vitals Were     Height BMI (Body Mass Index)                1.6 m (5' 3\") 22.14 kg/m2           Blood Pressure from Last 3 Encounters:   10/05/17 99/63   06/06/17 114/69   05/31/17 109/67    Weight from Last 3 Encounters:   11/14/17 56.7 kg (125 lb)   10/05/17 58.1 kg (128 lb)   06/06/17 57.2 kg (126 lb)              We Performed the Following     OTOLARYNGOLOGY REFERRAL        Primary Care Provider Office Phone # Fax #    Karina Castaneda Shira, APRN [facility] [phone] [address] [facility] [address]        Equal Access to Services     KHAI DINH : Jacky richtero Soomaali, waaxda luqadaha, qaybta kaalmada adeegyada, waxnakita rod . So [facility] [phone].    ATENCIÓN: Si habla español, tiene a hicks disposición servicios gratuitos de asistencia lingüística. Llame al [phone].    We comply with applicable federal civil rights laws and [place] laws. We do not discriminate on the basis of race, color, national origin, age, disability, sex, sexual orientation, or gender identity.            Thank you!     Thank you for choosing [facility] EAR NOSE AND THROAT  for your care. Our goal is always to provide you with excellent care. Hearing back from our patients is one way we can continue to improve our services. Please take a few minutes to complete the written survey that you may receive in the mail after your visit with us. Thank you!             Your Updated Medication List - Protect others around you: Learn how to safely use, store and throw away your medicines at www.disposemymeds.org.          This list is accurate as of: 11/14/17  1:50 PM.  Always use your most recent med list.                   Brand Name Dispense Instructions for use Diagnosis    estradiol 0.05 MG/24HR BIW patch    VIVELLE-DOT    24 patch    Place 1 patch onto the skin twice a week    Menopausal hot flushes         "

## 2017-11-14 NOTE — PROGRESS NOTES
Otolaryngology Adult Consultation    Patient: Shannan Harris  : 1970      HPI:  Shannan Harris is a 47 year old female seen today in the Otolaryngology Clinic for difficulty breathing through her nose.  The patient reports that when she was in her early 20s, she sustained nasal trauma and ever since then, she has not really been able to breathe well through the right side of her nose.  The trauma occurred when her dog jumped into bed and head butted her.  The change in her breathing was quite acute initially.  It was very difficult for her to get used to it, but over time she did grow accustomed to it but has always noted that the right side of her nose does not seem to move very much air.  When she sleeps at night or gets a cold, things get much worse for her.  She has had occasional nosebleeds but nothing significant.  She has not really found anything that seems to improve her nasal breathing significantly.  She also feels like her hearing is not as good recently.  Her mother has a history of hearing loss.         Medications:  Current Outpatient Rx   Medication Sig Dispense Refill     estradiol (VIVELLE-DOT) 0.05 MG/24HR BIW patch Place 1 patch onto the skin twice a week 24 patch 3       Allergies: Nkda [no known drug allergies]     PMH:  Past Medical History:   Diagnosis Date     Hot flashes        PSH:  Past Surgical History:   Procedure Laterality Date     BIOPSY OF SKIN LESION       HYSTERECTOMY      for uterine myoma and painful periods; has ovaries       FH:  Family History   Problem Relation Age of Onset     CANCER Mother      BCC     CANCER Brother      BCC     Cancer - colorectal No family hx of      Breast Cancer No family hx of      Skin Cancer No family hx of      Melanoma No family hx of         SH:  Social History   Substance Use Topics     Smoking status: Never Smoker     Smokeless tobacco: Never Used     Alcohol use No       Review of Systems  No flowsheet data found.    Physical Exam:     GEN:  The patient is alert, oriented and in no acute distress.  HEAD:  Head, face scalp is grossly normal.  EARS:  Ears demonstrate normal canals, auricles and tympanic membranes.  NOSE: There is a subtle deviation of the bones.  Internally, the patient has just a mild septal deviation to the right.  With sniff testing, there is a definite audible difference between how she breathes through the left versus the right side.   When she sniffs through her right side only, I can barely hear any airflow.  She does have a very narrow internal nasal valve.  She has improved breathing with both Hussein maneuver as well as with internal nasal valve stenting with a Q-tip.  Also, just using a nasal speculum to widen the upper portion of her nostril does also improve her breathing.  This is a very significant change for the patient and she was quite delighted by it.       Assessment/Plan:  The patient presents with difficulty breathing through her right side.  Intranasally, her septal deviation does not at first glance appear to be significant, and she also has thin turbinates bilaterally.  I do think she has issues with her internal nasal valve.  I would recommend that she be evaluated by our facial plastic surgeon, Dr. Rita Jacobo, to see if a functional rhinoplasty and septoplasty would be of benefit for her.  The patient recognizes that this is not an issue that would necessarily cause her harm but it does really greatly affect her quality of life.  She is interested in learning more about what is involved in the surgery and that will help her make her decision as to whether or not this is something that she would like to pursue.  In the meantime, I do not see any evidence of middle ear fluid or infection.  We will set her up for an audiogram and follow up with me to discuss the results.  A referral for Dr. Jacobo was made today.       I spent a total of 35 minutes face-to-face with Shannan Harris during  today's office visit.  Over 50% of this time was spent counseling the patient on and/or coordinating care as documented in my assessment and plan.

## 2017-12-08 ENCOUNTER — OFFICE VISIT (OUTPATIENT)
Dept: AUDIOLOGY | Facility: CLINIC | Age: 47
End: 2017-12-08

## 2017-12-08 DIAGNOSIS — Z01.10 ENCOUNTER FOR HEARING EXAMINATION WITHOUT ABNORMAL FINDINGS: ICD-10-CM

## 2017-12-08 DIAGNOSIS — H93.293 ABNORMAL AUDITORY PERCEPTION OF BOTH EARS: Primary | ICD-10-CM

## 2017-12-08 NOTE — PROGRESS NOTES
AUDIOLOGY REPORT    SUMMARY: Audiology visit completed. See audiogram for results.      RECOMMENDATIONS: Follow-up with ENT.    Bello Mckeon  Audiologist  [place] License  #[id number]

## 2017-12-08 NOTE — MR AVS SNAPSHOT
After Visit Summary   12/8/2017    Shannan Harris    MRN: [id number]           Patient Information     Date Of Birth          1970        Visit Information        Provider Department      12/8/2017 3:30 PM Kaylynn Corley AuD [facility] Audiology        Today's Diagnoses     Abnormal auditory perception of both ears    -  1    Encounter for hearing examination without abnormal findings           Follow-ups after your visit        Your next 10 appointments already scheduled     Dec 12, 2017  3:30 PM CST   (Arrive by 3:15 PM)   Return Visit with Nnamdi Fraser MD   [facility] ([facility])    [address] [place] [id number] [phone]            Jan 24, 2018  5:20 PM CST   (Arrive by 5:05 PM)   New Patient Visit with Jenn Jacobo MD   [facility] Nose [facility] Throat ([facility])    [address] [place] [id number] [phone]              Who to contact     Please call your clinic at [phone] to:    Ask questions about your health    Make or cancel appointments    Discuss your medicines    Learn about your test results    Speak to your doctor   If you have compliments or concerns about an experience at your clinic, or if you wish to file a complaint, please contact [facility] Patient Relations at [phone] or email us at [email or web address]         Additional Information About Your Visit        MyChart Information     [employer] gives you secure access to your electronic health record. If you see a primary care provider, you can also send messages to your care team and make appointments. If you have questions, please call your primary care clinic.  If you do not have a primary care provider, please call [phone] and they will assist you.      Calvin is an electronic gateway that provides easy, online access to your  medical records. With [employer]Star, you can request a clinic appointment, read your test results, renew a prescription or communicate with your care team.     To access your existing account, please contact your [facility] or call [phone] for assistance.        Care EveryWhere ID     This is your Care EveryWhere ID. This could be used by other organizations to access your [place] medical records  [id number]         Blood Pressure from Last 3 Encounters:   10/05/17 99/63   06/06/17 114/69   05/31/17 109/67    Weight from Last 3 Encounters:   11/14/17 56.7 kg (125 lb)   10/05/17 58.1 kg (128 lb)   06/06/17 57.2 kg (126 lb)              We Performed the Following     AUDIOGRAM/TYMPANOGRAM - INTERFACE     [facility] Eval [facility] Speech Recog ([address])     Tymps / Reflex   (740[address])        Primary Care Provider Office Phone # Fax #    Karina Tonya Silva, APRN [facility] [phone]       [address] [facility] [address]        Equal Access to Services     KHAI DINH : Hadii aad ku hadasho Soomaali, waaxda luqadaha, qaybta kaalmada adeana luisayada, nolberto rod . So [facility] [phone].    ATENCIÓN: Si habla español, tiene a hicks disposición servicios gratuitos de asistencia lingüística. [facility] [phone].    We comply with applicable federal civil rights laws and [place] laws. We do not discriminate on the basis of race, color, national origin, age, disability, sex, sexual orientation, or gender identity.            Thank you!     Thank you for choosing [facility]UDI[facility]  for your care. Our goal is always to provide you with excellent care. Hearing back from our patients is one way we can continue to improve our services. Please take a few minutes to complete the written survey that you may receive in the mail after your visit with us. Thank you!             Your Updated Medication List - Protect others around you: Learn how to safely use,  store and throw away your medicines at www.disposemymeds.org.          This list is accurate as of: 12/8/17  4:07 PM.  Always use your most recent med list.                   Brand Name Dispense Instructions for use Diagnosis    estradiol 0.05 MG/24HR BIW patch    VIVELLE-DOT    24 patch    Place 1 patch onto the skin twice a week    Menopausal hot flushes

## 2018-01-24 ENCOUNTER — OFFICE VISIT (OUTPATIENT)
Dept: OTOLARYNGOLOGY | Facility: CLINIC | Age: 48
End: 2018-01-24
Payer: COMMERCIAL

## 2018-01-24 VITALS — BODY MASS INDEX: 23 KG/M2 | HEIGHT: 62 IN | WEIGHT: 125 LBS

## 2018-01-24 DIAGNOSIS — J34.829 NASAL VALVE COLLAPSE: ICD-10-CM

## 2018-01-24 DIAGNOSIS — J34.89 NASAL OBSTRUCTION: Primary | ICD-10-CM

## 2018-01-24 DIAGNOSIS — J34.2 DEVIATED NASAL SEPTUM: ICD-10-CM

## 2018-01-24 ASSESSMENT — PAIN SCALES - GENERAL: PAINLEVEL: NO PAIN (0)

## 2018-01-24 NOTE — NURSING NOTE
"Chief Complaint   Patient presents with     Consult     surgical consult. Possible nasal surgery    Height 1.57 m (5' 1.81\"), weight 56.7 kg (125 lb), not currently breastfeeding.      Tex Amador LPN    "

## 2018-01-24 NOTE — PROGRESS NOTES
Facial Plastic and Reconstructive Surgery Consultation    Referring Provider:   Chanelle Andrade MD  [address] [facility] [address]  [place] [address]    HPI:   I had the pleasure of seeing Shannan Harris today in clinic for consultation for nasal obstruction, perceived internal nasal valve collapse on examination. Shannan Harris is a 47 year old female whose history is that follows:  when she was in her early 20s, she sustained nasal trauma and ever since then, she has not really been able to breathe well through the right side of her nose.  The trauma occurred when her dog jumped into bed and head butted her.  The change in her breathing was quite acute initially.  It was very difficult for her to get used to it, but over time she did grow accustomed to it but has always noted that the right side of her nose does not seem to move very much air.  When she sleeps at night or gets a cold, things get much worse for her.  She has had occasional nosebleeds but nothing significant.  She has not really found anything that seems to improve her nasal breathing significantly.      She consistently uses nasal decongestant sprays.  She is quite reticent to have any large surgical procedure but comes in today for discussion of possible options.  She has never had nasal surgery.  She does not have a history of sinus infections.  She does not have a significant history of allergies.    Review Of Systems  ROS: 10 point ROS neg other than the symptoms noted above in the HPI.    Patient Active Problem List   Diagnosis     Melanocytic nevus     SK (seborrheic keratosis)     History of basal cell carcinoma     Hot flashes     Neoplasm of uncertain behavior of skin     Tinea pedis of both feet     Nondisplaced fracture of fifth left metatarsal bone with routine healing     Left foot pain     History of basal cell cancer     Tinea pedis, unspecified laterality     Past Surgical History:   Procedure Laterality Date     BIOPSY OF  SKIN LESION       HYSTERECTOMY  2009    for uterine myoma and painful periods; has ovaries     Current Outpatient Prescriptions   Medication Sig Dispense Refill     estradiol (VIVELLE-DOT) 0.05 MG/24HR BIW patch Place 1 patch onto the skin twice a week 24 patch 3     Nkda [no known drug allergies]  Social History     Social History     Marital status: Significant other     Spouse name: N/A     Number of children: 0     Years of education: N/A     Occupational History     research [employer]     Social History Main Topics     Smoking status: Never Smoker     Smokeless tobacco: Never Used     Alcohol use No     Drug use: No     Sexual activity: Yes     Partners: Male     Birth control/ protection: Surgical      Comment: hyst     Other Topics Concern     Not on file     Social History Narrative    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    One son, born by surrogacy in [place] in 2014.    Getting  in June 2017.     Family History   Problem Relation Age of Onset     CANCER Mother      BCC     CANCER Brother      BCC     Cancer - colorectal No family hx of      Breast Cancer No family hx of      Skin Cancer No family hx of      Melanoma No family hx of        PE:  Alert and Oriented, Answering Questions Appropriately  Atraumatic, Normocephalic, Face Symmetric  Nasal Exam: Patient has a blunted supratip dip.  She has a wide nasal tip.  More trapezoidal shape.  She is cephalic malposition of the lower lateral cartilages.  She is poor internal nasal valve support bilaterally.  Anterior rhinoscopy demonstrates a mild septal deviation but this is high dorsally on the right right at the level of the internal nasal valve.  There is contact of the upper lateral cartilage with the septum here.  When the speculum is placed there is improvement in breathing.  Modified Hussein maneuver and Hussein maneuver improve her breathing substantially.  No polyps, no masses  Chin: Normal   Lips/Teeth/Toungue/Gums: Lips intact,  Normal Dentition, Occlusion intact, Oral mucosa intact, no lesions/ulcerations/masses, Tongue mobile  Neck: No lymphadenopathy, no thyromegaly, trachea midline  Chest: No wheezing, cyanosis, or stridor  Card: Normal upper extremity pulses and capillary refill, not diaphoretic  Neuro/Psych: CN's 2-12 intact, Moves all extremities, ambulation in intact, positive affect, no notable muscle weakness            IMPRESSION:    Septal deviation  Internal nasal valve collapse        PLAN:    Shannan Harris presents today for evaluation of her nasal breathing.  I agree with Dr. Andrade's evaluation there is septal deformity is mild however it primarily contributes to her narrow internal nasal valve on the right.  She has lack of lateral support with internal nasal valve collapse, slight deviation of the septum dorsally there also leads to making this the more significantly obstructed side.    I discussed surgical approach for repairing this.  She is quite reticent to proceed with any type of surgical intervention.  She was quite vocal about this.  I do think that nose cones would actually be significantly beneficial for her.  I demonstrated how they work and gave her a printout of the ones that I think are the best.    I also counseled her about rhinitis medicamentosa and cautious use of nasal decongestants.    She understands that the surgery for her breathing is a quality of life surgery but does have a significant investment of time for recovery and requires general anesthesia.    I provided her some written diagrams explaining her pathology in the future intervention where she would want to proceed.  She will reach out to me in the future where she interested.    Photos were not taken today.      I spent a total of 45 minutes face-to-face with Shannan Pinedosae during today's office visit.  Over 50% of this time was spent counseling the patient and/or coordinating care regarding her nasal obstruction, its symptoms, the  anatomical deformity leading to this, and the surgical therapeutic options..  See note for details.

## 2018-01-24 NOTE — LETTER
1/24/2018       RE: Shannan Harris  [address]  [facility] [address]     Dear Colleague,    Thank you for referring your patient, Shannan Harris, to the [facility] at [facility]. Please see a copy of my visit note below.    Facial Plastic and Reconstructive Surgery Consultation    Referring Provider:   Chanelle Andrade MD  [address] [facility] [facility] [address]  [place] [address]    HPI:   I had the pleasure of seeing Shannan Harris today in clinic for consultation for nasal obstruction, perceived internal nasal valve collapse on examination. Shannan Harris is a 47 year old female whose history is that follows:  when she was in her early 20s, she sustained nasal trauma and ever since then, she has not really been able to breathe well through the right side of her nose.  The trauma occurred when her dog jumped into bed and head butted her.  The change in her breathing was quite acute initially.  It was very difficult for her to get used to it, but over time she did grow accustomed to it but has always noted that the right side of her nose does not seem to move very much air.  When she sleeps at night or gets a cold, things get much worse for her.  She has had occasional nosebleeds but nothing significant.  She has not really found anything that seems to improve her nasal breathing significantly.      She consistently uses nasal decongestant sprays.  She is quite reticent to have any large surgical procedure but comes in today for discussion of possible options.  She has never had nasal surgery.  She does not have a history of sinus infections.  She does not have a significant history of allergies.    Review Of Systems  ROS: 10 point ROS neg other than the symptoms noted above in the HPI.    Patient Active Problem List   Diagnosis     Melanocytic nevus     SK (seborrheic keratosis)     History of basal cell carcinoma     Hot flashes     Neoplasm of uncertain  behavior of skin     Tinea pedis of both feet     Nondisplaced fracture of fifth left metatarsal bone with routine healing     Left foot pain     History of basal cell cancer     Tinea pedis, unspecified laterality     Past Surgical History:   Procedure Laterality Date     BIOPSY OF SKIN LESION       HYSTERECTOMY  2009    for uterine myoma and painful periods; has ovaries     Current Outpatient Prescriptions   Medication Sig Dispense Refill     estradiol (VIVELLE-DOT) 0.05 MG/24HR BIW patch Place 1 patch onto the skin twice a week 24 patch 3     Nkda [no known drug allergies]  Social History     Social History     Marital status: Significant other     Spouse name: N/A     Number of children: 0     Years of education: N/A     Occupational History     research [employer]     Social History Main Topics     Smoking status: Never Smoker     Smokeless tobacco: Never Used     Alcohol use No     Drug use: No     Sexual activity: Yes     Partners: Male     Birth control/ protection: Surgical      Comment: hyst     Other Topics Concern     Not on file     Social History Narrative    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    One son, born by surrogacy in [place] in 2014.    Getting  in June 2017.     Family History   Problem Relation Age of Onset     CANCER Mother      BCC     CANCER Brother      BCC     Cancer - colorectal No family hx of      Breast Cancer No family hx of      Skin Cancer No family hx of      Melanoma No family hx of        PE:  Alert and Oriented, Answering Questions Appropriately  Atraumatic, Normocephalic, Face Symmetric  Nasal Exam: Patient has a blunted supratip dip.  She has a wide nasal tip.  More trapezoidal shape.  She is cephalic malposition of the lower lateral cartilages.  She is poor internal nasal valve support bilaterally.  Anterior rhinoscopy demonstrates a mild septal deviation but this is high dorsally on the right right at the level of the internal nasal valve.  There is  contact of the upper lateral cartilage with the septum here.  When the speculum is placed there is improvement in breathing.  Modified Brunswick maneuver and Brunswick maneuver improve her breathing substantially.  No polyps, no masses  Chin: Normal   Lips/Teeth/Toungue/Gums: Lips intact, Normal Dentition, Occlusion intact, Oral mucosa intact, no lesions/ulcerations/masses, Tongue mobile  Neck: No lymphadenopathy, no thyromegaly, trachea midline  Chest: No wheezing, cyanosis, or stridor  Card: Normal upper extremity pulses and capillary refill, not diaphoretic  Neuro/Psych: CN's 2-12 intact, Moves all extremities, ambulation in intact, positive affect, no notable muscle weakness            IMPRESSION:    Septal deviation  Internal nasal valve collapse        PLAN:    Shannan Harris presents today for evaluation of her nasal breathing.  I agree with Dr. Andrade's evaluation there is septal deformity is mild however it primarily contributes to her narrow internal nasal valve on the right.  She has lack of lateral support with internal nasal valve collapse, slight deviation of the septum dorsally there also leads to making this the more significantly obstructed side.    I discussed surgical approach for repairing this.  She is quite reticent to proceed with any type of surgical intervention.  She was quite vocal about this.  I do think that nose cones would actually be significantly beneficial for her.  I demonstrated how they work and gave her a printout of the ones that I think are the best.    I also counseled her about rhinitis medicamentosa and cautious use of nasal decongestants.    She understands that the surgery for her breathing is a quality of life surgery but does have a significant investment of time for recovery and requires general anesthesia.    I provided her some written diagrams explaining her pathology in the future intervention where she would want to proceed.  She will reach out to me in the future where  she interested.    Photos were not taken today.      I spent a total of 45 minutes face-to-face with Shannan Pinedosae during today's office visit.  Over 50% of this time was spent counseling the patient and/or coordinating care regarding her nasal obstruction, its symptoms, the anatomical deformity leading to this, and the surgical therapeutic options..  See note for details.        Again, thank you for allowing me to participate in the care of your patient.      Sincerely,    Jenn Jacobo MD

## 2018-01-24 NOTE — MR AVS SNAPSHOT
"              After Visit Summary   1/24/2018    Shannan Harris    MRN: [id number]           Patient Information     Date Of Birth          1970        Visit Information        Provider Department      1/24/2018 5:20 PM Jenn Jacobo MD [facility] Ear Nose and Throat        Today's Diagnoses     Nasal obstruction    -  1    Deviated nasal septum        Nasal valve collapse           Follow-ups after your visit        Who to contact     Please call your clinic at [phone] to:    Ask questions about your health    Make or cancel appointments    Discuss your medicines    Learn about your test results    Speak to your doctor   If you have compliments or concerns about an experience at your clinic, or if you wish to file a complaint, please contact [facility] Patient Relations at [phone] or email us at [email or web address]         Additional Information About Your Visit        MyChart Information     [employer] gives you secure access to your electronic health record. If you see a primary care provider, you can also send messages to your care team and make appointments. If you have questions, please call your primary care clinic.  If you do not have a primary care provider, please call [phone] and they will assist you.      Socitive is an electronic gateway that provides easy, online access to your medical records. With [employer], you can request a clinic appointment, read your test results, renew a prescription or communicate with your care team.     To access your existing account, please contact your [facility] or call [phone] for assistance.        Care EveryWhere ID     This is your Care EveryWhere ID. This could be used by other organizations to access your [place] medical records  [id number]        Your Vitals Were     Height BMI (Body Mass Index)                1.57 m (5' 1.81\") 23 kg/m2           Blood Pressure from Last " 3 Encounters:   10/05/17 99/63   06/06/17 114/69   05/31/17 109/67    Weight from Last 3 Encounters:   01/24/18 56.7 kg (125 lb)   11/14/17 56.7 kg (125 lb)   10/05/17 58.1 kg (128 lb)              Today, you had the following     No orders found for display       Primary Care Provider Office Phone # Fax #    AMANDA Perez [facility] [phone]       [address] [facility] [address]        Equal Access to Services     [facility]: Hadii aad ku hadasho Soomaali, waaxda luqadaha, qaybta kaalmada adeegyada, nolberto rod . So [facility] [phone].    ATENCIÓN: Si habla español, tiene a hicks disposición servicios gratuitos de asistencia lingüística. Llame al [phone].    We comply with applicable federal civil rights laws and [place] laws. We do not discriminate on the basis of race, color, national origin, age, disability, sex, sexual orientation, or gender identity.            Thank you!     Thank you for choosing [facility] EAR NOSE AND THROAT  for your care. Our goal is always to provide you with excellent care. Hearing back from our patients is one way we can continue to improve our services. Please take a few minutes to complete the written survey that you may receive in the mail after your visit with us. Thank you!             Your Updated Medication List - Protect others around you: Learn how to safely use, store and throw away your medicines at www.disposemymeds.org.          This list is accurate as of 1/24/18  7:15 PM.  Always use your most recent med list.                   Brand Name Dispense Instructions for use Diagnosis    estradiol 0.05 MG/24HR BIW patch    VIVELLE-DOT    24 patch    Place 1 patch onto the skin twice a week    Menopausal hot flushes

## 2018-07-17 ENCOUNTER — OFFICE VISIT (OUTPATIENT)
Dept: DERMATOLOGY | Facility: CLINIC | Age: 48
End: 2018-07-17
Payer: COMMERCIAL

## 2018-07-17 DIAGNOSIS — D18.01 CHERRY ANGIOMA: ICD-10-CM

## 2018-07-17 DIAGNOSIS — Z85.828 HISTORY OF BASAL CELL CANCER: Primary | ICD-10-CM

## 2018-07-17 DIAGNOSIS — Z86.018 HISTORY OF DYSPLASTIC NEVUS: ICD-10-CM

## 2018-07-17 DIAGNOSIS — L21.9 DERMATITIS, SEBORRHEIC: ICD-10-CM

## 2018-07-17 DIAGNOSIS — L82.1 SK (SEBORRHEIC KERATOSIS): ICD-10-CM

## 2018-07-17 DIAGNOSIS — D22.9 MULTIPLE BENIGN MELANOCYTIC NEVI: ICD-10-CM

## 2018-07-17 ASSESSMENT — PAIN SCALES - GENERAL: PAINLEVEL: NO PAIN (0)

## 2018-07-17 NOTE — LETTER
7/17/2018       RE: Shannan Harris  [address]  [facility] [address]     Dear Colleague,    Thank you for referring your patient, Shannan Harris, to the [facility] DERMATOLOGY at [facility]. Please see a copy of my visit note below.    [facility] Dermatology Note      Dermatology Problem List:  1.BCC's:  -Left upper chest status post ED&C.    -Left abdomen.    -Right flank.    2. Tinea pedis  3. Moderate to severely dysplastic nevus (central upper back)  - s/p excision 8/11/2016    Encounter Date: Jul 17, 2018    CC:  FBSE    History of Present Illness:  Ms. Shannan Harris is a 48 year old female who presents as a follow-up for FBSE. Since she was last seen by Dr. Ambrocio on 9/19/17 she has done quite well. No recurrence or issues with benign nevi biopsy sites. No concerns with BCC areas. Endorses great sun protection. She would like us to look at two stable and unchanging lesions on the R forearm and the L cheek. No symptomatic lesions.     Past Medical History:   Patient Active Problem List   Diagnosis     Melanocytic nevus     SK (seborrheic keratosis)     History of basal cell carcinoma     Hot flashes     Neoplasm of uncertain behavior of skin     Tinea pedis of both feet     Nondisplaced fracture of fifth left metatarsal bone with routine healing     Left foot pain     History of basal cell cancer     Tinea pedis, unspecified laterality     Nasal obstruction     Deviated nasal septum     Nasal valve collapse     Past Medical History:   Diagnosis Date     Gastroesophageal reflux disease      Hot flashes      Past Surgical History:   Procedure Laterality Date     BIOPSY OF SKIN LESION       HYSTERECTOMY  2009    for uterine myoma and painful periods; has ovaries       Social History:  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 The patient denies use of tanning beds.    Family History:  Grandmother, mother and brother with NMSC.  No history of melanoma.    Medications:  Current Outpatient Prescriptions   Medication Sig Dispense Refill     estradiol (VIVELLE-DOT) 0.05 MG/24HR BIW patch Place 1 patch onto the skin twice a week 24 patch 3        Allergies   Allergen Reactions     Nkda [No Known Drug Allergies]        Review of Systems:  -Not pertinent to the current visit.  -Constitutional: The patient denies fatigue, fevers, chills, unintended weight loss, and night sweats.  -HEENT: Patient denies nonhealing oral sores.  -Skin: As above in HPI. No additional skin concerns.    Physical exam:  Vitals: There were no vitals taken for this visit.  GEN: This is a well developed, well-nourished female in no acute distress, in a pleasant mood.    SKIN: Total skin excluding the undergarment areas was performed. The exam included the head/face, neck, both arms, chest, back, abdomen, both legs, digits and/or nails.     Other lesions  -Scattered brown and pink melanocytic macules and papules over the areas evaluated with reticular pigment network on dermoscopy. All <5-6 mm. Reassuring clinical findings overall. Largest on the right lower back. Stable.   -Venkatesh I-II skin.   -Well healed scars with NERD on the previous BCC and DN sites.   -Mild flaking scale on the scalp diffusely.   -Scattered cherry red macules and papules.   -Medium brown small macules/patches and thin plaques on the sun exposed area including a 6 mm thin papule and macule on the right volar forearm and left malar cheek respectively. Stuck on appearing with reassuring dermoscopy findings. Normal appearing vessels.   -Mild scale on the right interweb spaces.   -No other lesions of concern on areas examined.     Impression/Plan:  1. H/o BCCs, NERD on today's exam.  Reassurance.      2. Hx DN. NERD.   -Sun protection.   -ABCDE's melanoma reviewed.     3. Benign skin findings: seborrheic keratoses, cherry angiomata, lentigines and multiple benign nevi.   -Benign, reassurance provided. Nothing  to do at this point.      4. Seborrheic dermatitis, scalp; mild.   -Start zinc containing OTC shampoo (Head and shoulders).     CC Dr. Silva on close of this encounter.    Follow-up in 1 year, earlier for new or changing lesions.      staffed the patient.    Staff Involved:  Resident(Antelmo Lau )/Staff(as above)  Antelmo Lau MD  PGY4 Dermatology  [phone]     Staff Physician Comments:   I saw and evaluated the patient with the resident and I agree with the assessment and plan.  I was present for the examination.    Nehemias Wayne MD  Dermatology Staff Physician  , Department of Dermatology

## 2018-07-17 NOTE — MR AVS SNAPSHOT
After Visit Summary   7/17/2018    Shannan Harris    MRN: [id number]           Patient Information     Date Of Birth          1970        Visit Information        Provider Department      7/17/2018 4:40 PM Nehemias Wayne MD [facility] Dermatology        Today's Diagnoses     History of basal cell cancer    -  1    History of dysplastic nevus        Cherry angioma        Dermatitis, seborrheic        SK (seborrheic keratosis)          Care Instructions                  The ABCDEs of Melanoma    Skin cancer can develop anywhere on the skin. Ask someone for help when checking your skin, especially in hard to see places. If you notice a mole different from others, or that changes, enlarges, itches, or bleeds (even if it is small), you should see a dermatologist.          Start zinc containing shampoo three times weekly.           Follow-ups after your visit        Follow-up notes from your care team     Return in about 1 year (around 7/17/2019).      Who to contact     Please call your clinic at [phone] to:    Ask questions about your health    Make or cancel appointments    Discuss your medicines    Learn about your test results    Speak to your doctor            Additional Information About Your Visit        MyChart Information     [employer] gives you secure access to your electronic health record. If you see a primary care provider, you can also send messages to your care team and make appointments. If you have questions, please call your primary care clinic.  If you do not have a primary care provider, please call [phone] and they will assist you.      K[employer]hoo is an electronic gateway that provides easy, online access to your medical records. With [employer]o, you can request a clinic appointment, read your test results, renew a prescription or communicate with your care team.     To access your existing account, please contact your [facility] or call  [phone] for assistance.        Care EveryWhere ID     This is your Care EveryWhere ID. This could be used by other organizations to access your [place] medical records  [id number]         Blood Pressure from Last 3 Encounters:   10/05/17 99/63   06/06/17 114/69   05/31/17 109/67    Weight from Last 3 Encounters:   01/24/18 56.7 kg (125 lb)   11/14/17 56.7 kg (125 lb)   10/05/17 58.1 kg (128 lb)              Today, you had the following     No orders found for display       Primary Care Provider Office Phone # Fax #    Karina Silva, APRN [facility] [phone]       [address] [facility] [address]        Equal Access to Services     KHAI DINH : Hadii aad ku hadasho Soomaali, waaxda luqadaha, qaybta kaalmada adeegyada, nolberto rod . So [facility] [phone].    ATENCIÓN: Si habla español, tiene a hicks disposición servicios gratuitos de asistencia lingüística. Llame al [phone].    We comply with applicable federal civil rights laws and [place] laws. We do not discriminate on the basis of race, color, national origin, age, disability, sex, sexual orientation, or gender identity.            Thank you!     Thank you for choosing [facility] DERMATOLOGY  for your care. Our goal is always to provide you with excellent care. Hearing back from our patients is one way we can continue to improve our services. Please take a few minutes to complete the written survey that you may receive in the mail after your visit with us. Thank you!             Your Updated Medication List - Protect others around you: Learn how to safely use, store and throw away your medicines at www.disposemymeds.org.          This list is accurate as of 7/17/18  5:22 PM.  Always use your most recent med list.                   Brand Name Dispense Instructions for use Diagnosis    estradiol 0.05 MG/24HR BIW patch    VIVELLE-DOT    24 patch    Place 1 patch onto the skin twice a week    Menopausal  hot flushes

## 2018-07-17 NOTE — PROGRESS NOTES
[facility] Dermatology Note      Dermatology Problem List:  1.BCC's:  -Left upper chest status post ED&C.    -Left abdomen.    -Right flank.    2. Tinea pedis  3. Moderate to severely dysplastic nevus (central upper back)  - s/p excision 8/11/2016    Encounter Date: Jul 17, 2018    CC:  FBSE    History of Present Illness:  Ms. Shannan Harris is a 48 year old female who presents as a follow-up for FBSE. Since she was last seen by Dr. Ambrocio on 9/19/17 she has done quite well. No recurrence or issues with benign nevi biopsy sites. No concerns with BCC areas. Endorses great sun protection. She would like us to look at two stable and unchanging lesions on the R forearm and the L cheek. No symptomatic lesions.     Past Medical History:   Patient Active Problem List   Diagnosis     Melanocytic nevus     SK (seborrheic keratosis)     History of basal cell carcinoma     Hot flashes     Neoplasm of uncertain behavior of skin     Tinea pedis of both feet     Nondisplaced fracture of fifth left metatarsal bone with routine healing     Left foot pain     History of basal cell cancer     Tinea pedis, unspecified laterality     Nasal obstruction     Deviated nasal septum     Nasal valve collapse     Past Medical History:   Diagnosis Date     Gastroesophageal reflux disease      Hot flashes      Past Surgical History:   Procedure Laterality Date     BIOPSY OF SKIN LESION       HYSTERECTOMY  2009    for uterine myoma and painful periods; has ovaries       Social History:  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 The patient denies use of tanning beds.    Family History:  Grandmother, mother and brother with NMSC. No history of melanoma.    Medications:  Current Outpatient Prescriptions   Medication Sig Dispense Refill     estradiol (VIVELLE-DOT) 0.05 MG/24HR BIW patch Place 1 patch onto the skin twice a week 24 patch 3        Allergies   Allergen Reactions     Nkda [No Known Drug  Allergies]        Review of Systems:  -Not pertinent to the current visit.  -Constitutional: The patient denies fatigue, fevers, chills, unintended weight loss, and night sweats.  -HEENT: Patient denies nonhealing oral sores.  -Skin: As above in HPI. No additional skin concerns.    Physical exam:  Vitals: There were no vitals taken for this visit.  GEN: This is a well developed, well-nourished female in no acute distress, in a pleasant mood.    SKIN: Total skin excluding the undergarment areas was performed. The exam included the head/face, neck, both arms, chest, back, abdomen, both legs, digits and/or nails.     Other lesions  -Scattered brown and pink melanocytic macules and papules over the areas evaluated with reticular pigment network on dermoscopy. All <5-6 mm. Reassuring clinical findings overall. Largest on the right lower back. Stable.   -Venkatesh I-II skin.   -Well healed scars with NERD on the previous BCC and DN sites.   -Mild flaking scale on the scalp diffusely.   -Scattered cherry red macules and papules.   -Medium brown small macules/patches and thin plaques on the sun exposed area including a 6 mm thin papule and macule on the right volar forearm and left malar cheek respectively. Stuck on appearing with reassuring dermoscopy findings. Normal appearing vessels.   -Mild scale on the right interweb spaces.   -No other lesions of concern on areas examined.     Impression/Plan:  1. H/o BCCs, NERD on today's exam.  Reassurance.      2. Hx DN. NERD.   -Sun protection.   -ABCDE's melanoma reviewed.     3. Benign skin findings: seborrheic keratoses, cherry angiomata, lentigines and multiple benign nevi.   -Benign, reassurance provided. Nothing to do at this point.      4. Seborrheic dermatitis, scalp; mild.   -Start zinc containing OTC shampoo (Head and shoulders).     CC Dr. Silva on close of this encounter.    Follow-up in 1 year, earlier for new or changing lesions.      staffed the  patient.    Staff Involved:  Resident(Antelmo Lau )/Staff(as above)  Antelmo Lau MD  PGY4 Dermatology  [phone]     Staff Physician Comments:   I saw and evaluated the patient with the resident and I agree with the assessment and plan.  I was present for the examination.    Nehemias Wayne MD  Dermatology Staff Physician  , Department of Dermatology

## 2018-07-17 NOTE — NURSING NOTE
Dermatology Rooming Note    Shannan Harris's goals for this visit include:   Chief Complaint   Patient presents with     Skin Check     Annual skin check. Shannan does have a few spots of concern today.     Jacque Harp, CMA

## 2018-07-17 NOTE — PATIENT INSTRUCTIONS
The ABCDEs of Melanoma    Skin cancer can develop anywhere on the skin. Ask someone for help when checking your skin, especially in hard to see places. If you notice a mole different from others, or that changes, enlarges, itches, or bleeds (even if it is small), you should see a dermatologist.          Start zinc containing shampoo three times weekly.

## 2018-09-18 ENCOUNTER — OFFICE VISIT (OUTPATIENT)
Dept: INTERNAL MEDICINE | Facility: CLINIC | Age: 48
End: 2018-09-18
Payer: COMMERCIAL

## 2018-09-18 VITALS
HEART RATE: 69 BPM | OXYGEN SATURATION: 99 % | DIASTOLIC BLOOD PRESSURE: 65 MMHG | SYSTOLIC BLOOD PRESSURE: 101 MMHG | TEMPERATURE: 97.8 F | WEIGHT: 132.4 LBS | BODY MASS INDEX: 24.36 KG/M2

## 2018-09-18 DIAGNOSIS — K21.9 GASTROESOPHAGEAL REFLUX DISEASE, ESOPHAGITIS PRESENCE NOT SPECIFIED: ICD-10-CM

## 2018-09-18 DIAGNOSIS — R11.0 NAUSEA: Primary | ICD-10-CM

## 2018-09-18 RX ORDER — FAMOTIDINE 40 MG/1
40 TABLET, FILM COATED ORAL AT BEDTIME
Qty: 30 TABLET | Refills: 1 | Status: SHIPPED | OUTPATIENT
Start: 2018-09-18 | End: 2019-03-13

## 2018-09-18 ASSESSMENT — PAIN SCALES - GENERAL: PAINLEVEL: NO PAIN (0)

## 2018-09-18 NOTE — NURSING NOTE
Chief Complaint   Patient presents with     Nausea     nausea x 3 weeks per pt - worse with activity      Hmoa Dupree at 4:35 PM on 9/18/2018.

## 2018-09-18 NOTE — MR AVS SNAPSHOT
After Visit Summary   9/18/2018    Shannan Harris    MRN: [id number]           Patient Information     Date Of Birth          1970        Visit Information        Provider Department      9/18/2018 4:45 PM Karina Silva APRN [facility]        Today's Diagnoses     Nausea    -  1    Gastroesophageal reflux disease, esophagitis presence not specified           Follow-ups after your visit        Additional Services     GASTROENTEROLOGY ADULT REF PROCEDURE ONLY       Last Lab Result: Creatinine (mg/dL)       Date                     Value                 06/06/2017               0.64             ----------  Body mass index is 24.36 kg/(m^2).     Needed:  No  Language:  English    Patient will be contacted to schedule procedure.     Please be aware that coverage of these services is subject to the terms and limitations of your health insurance plan.  Call member services at your health plan with any benefit or coverage questions.  Any procedures must be performed at a [facility] facility OR coordinated by your clinic's referral office.    Please bring the following with you to your appointment:    (1) Any X-Rays, CTs or MRIs which have been performed.  Contact the facility where they were done to arrange for  prior to your scheduled appointment.    (2) List of current medications   (3) This referral request   (4) Any documents/labs given to you for this referral                  Follow-up notes from your care team     Return if symptoms worsen or fail to improve.      Your next 10 appointments already scheduled     Oct 08, 2018  1:00 PM CDT   MA SCREENING BILATERAL W/ AMOS with [id number]   [facility] ([facility])    [address] [place] [address], 2nd Floor  [facility] [address] [phone]           Three-dimensional (3D) mammograms are available at [facility] locations in [facility], [facility],  "[facility], [place], [place], and [place]. [facility] locations include [place] and [facility] in [place]. Benefits of 3D mammograms include: - Improved rate of cancer detection - Decreases your chance of having to go back for more tests, which means fewer: - \"False-positive\" results (This means that there is an abnormal area but it isn't cancer.) - Invasive testing procedures, such as a biopsy or surgery - Can provide clearer images of the breast if you have dense breast tissue. 3D mammography is an optional exam that anyone can have with a 2D mammogram. It doesn't replace or take the place of a 2D mammogram. 2D mammograms remain an effective screening test for all women.  Not all insurance companies cover the cost of a 3D mammogram. Check with your insurance.            Oct 25, 2018  3:00 PM CDT   Annual Visit with Nanette Blanton MD   [facility] ([facility])    [place] [facility] [address]  [address]  [address]  [facility] [id number] [phone]              Future tests that were ordered for you today     Open Future Orders        Priority Expected Expires Ordered    Stool Ova and Parasite Routine 9/18/2018 9/18/2019 9/18/2018    Stool Ova and Parasite Routine 9/18/2018 9/18/2019 9/18/2018    Stool Ova and Parasite Routine 9/18/2018 9/18/2019 9/18/2018            Who to contact     Please call your clinic at [phone] to:    Ask questions about your health    Make or cancel appointments    Discuss your medicines    Learn about your test results    Speak to your doctor            Additional Information About Your Visit        MyChart Information     [employer] gives you secure access to your electronic health record. If you see a primary care provider, you can also send messages to your care team and make appointments. If you have questions, please call your primary care clinic.  If you do not have a primary care provider, " please call [phone] and they will assist you.      [employer] is an electronic gateway that provides easy, online access to your medical records. With [employer], you can request a clinic appointment, read your test results, renew a prescription or communicate with your care team.     To access your existing account, please contact your [facility] or call [phone] for assistance.        Care EveryWhere ID     This is your Care EveryWhere ID. This could be used by other organizations to access your [place] medical records  [id number]        Your Vitals Were     Pulse Temperature Pulse Oximetry BMI (Body Mass Index)          69 97.8  F (36.6  C) (Oral) 99% 24.36 kg/m2         Blood Pressure from Last 3 Encounters:   09/18/18 101/65   10/05/17 99/63   06/06/17 114/69    Weight from Last 3 Encounters:   09/18/18 60.1 kg (132 lb 6.4 oz)   01/24/18 56.7 kg (125 lb)   11/14/17 56.7 kg (125 lb)              We Performed the Following     GASTROENTEROLOGY ADULT REF PROCEDURE ONLY          Today's Medication Changes          These changes are accurate as of 9/18/18  5:10 PM.  If you have any questions, ask your nurse or doctor.               Start taking these medicines.        Dose/Directions    famotidine 40 MG tablet   Commonly known as:  PEPCID   Used for:  Gastroesophageal reflux disease, esophagitis presence not specified   Started by:  Karina Silva APRN CNP        Dose:  40 mg   Take 1 tablet (40 mg) by mouth At Bedtime   Quantity:  30 tablet   Refills:  1            Where to get your medicines      These medications were sent to [facility] [address] [place] [address] [address] AT [facility] [place] [address] [address], [facility] [address]     Phone:  [phone]     famotidine 40 MG tablet                Primary Care Provider Office Phone # Fax #    AMANDA Perez [phone]       [address] [place] [address]  [address]        Equal Access to Services     [facility]: Hadii aad ku hadmyaliset Cathiali, waharrisda luqcassandraha, qalyly vickiehenriquenolberto parikh. So [facility] [place] [phone].    ATENCIÓN: Si habla español, tiene a hicks disposición servicios gratuitos de asistencia lingüística. Llame al [phone].    We comply with applicable federal civil rights laws and [place] laws. We do not discriminate on the basis of race, color, national origin, age, disability, sex, sexual orientation, or gender identity.            Thank you!     Thank you for choosing [facility] PRIMARY CARE CLINIC  for your care. Our goal is always to provide you with excellent care. Hearing back from our patients is one way we can continue to improve our services. Please take a few minutes to complete the written survey that you may receive in the mail after your visit with us. Thank you!             Your Updated Medication List - Protect others around you: Learn how to safely use, store and throw away your medicines at www.disposemymeds.org.          This list is accurate as of 9/18/18  5:10 PM.  Always use your most recent med list.                   Brand Name Dispense Instructions for use Diagnosis    estradiol 0.05 MG/24HR BIW patch    VIVELLE-DOT    24 patch    Place 1 patch onto the skin twice a week    Menopausal hot flushes       famotidine 40 MG tablet    PEPCID    30 tablet    Take 1 tablet (40 mg) by mouth At Bedtime    Gastroesophageal reflux disease, esophagitis presence not specified

## 2018-09-18 NOTE — PROGRESS NOTES
[facility]  Primary Care Center   Karina Wileyradhawarren, APRVIKA CNP  09/18/2018      Chief Complaint:   Nausea       History of Present Illness:   Shannan Harris is a 48 year old female with a history of GERD who presents for evaluation of nausea. She has had intermittent nausea for about three weeks. She has been taking protonix intermittently which did not help with nausea. Taking omeprazole has been helping a little, but nausea is still present. Nausea worsens with yoga or other physical activity. She denies pain and recent vomiting. She also denies dietary and medication changes. She discontinued her supplements to see if they were causing nausea but did not have any changes. She is concerned about parasites because she eats raw fish and also could have been exposed while with her family in [facility]. Her mother was diagnosed with a parasite after experiencing weakness and nausea.   Bowel movements are normal. She denies headaches but endorses intermittent dizziness related to her low blood pressure. Dizziness does not bother her and feels like she is lightheaded. Denies headaches. She also denies weakness, numbness, and tingling. She has had an EGD in the past which lead to her starting PPIs, which showed gastritis--she was apparently instructed to have a f/u EGD in 5 years, which would have been due ~4 years ago. She discontinued PPIs after breaking her foot twice.     Review of Systems:   Pertinent items are noted in HPI, remainder of complete ROS is negative.      Active Medications:     Current Outpatient Prescriptions:      estradiol (VIVELLE-DOT) 0.05 MG/24HR BIW patch, Place 1 patch onto the skin twice a week, Disp: 24 patch, Rfl: 3     famotidine (PEPCID) 40 MG tablet, Take 1 tablet (40 mg) by mouth At Bedtime, Disp: 30 tablet, Rfl: 1      Allergies:   Nkda [no known drug allergies]      Past Medical History:  GERD (gastroesophageal reflux disease)  seborrheic keratosis   Melanocytic nevus  Left foot pain  Tinea  pedis  Deviated nasal septum     Past Surgical History:  Hysterectomy  Biopsy of skin lesion    Family History:   Mother: cancer  Brother: cancer  Paternal uncles: brain cancer, pancreatic cancer    Social History:     Non smoker    Physical Exam:   /65 (BP Location: Right arm, Patient Position: Sitting)  Pulse 69  Temp 97.8  F (36.6  C) (Oral)  Wt 60.1 kg (132 lb 6.4 oz)  SpO2 99%  BMI 24.36 kg/m2   Constitutional: Alert, oriented, pleasant, no acute distress  Head: Normocephalic, atraumatic  Eyes: Extra-ocular movements intact, pupils equally round and reactive bilaterally, no scleral icterus  Ears: tympanic membranes pearly gray with positive light reflex  ENT: Oropharynx clear, moist mucus membranes, good dentition  Neck: Supple, no lymphadenopathy, no thyromegaly  Cardiovascular: Regular rate and rhythm, no murmurs, rubs or gallops  Respiratory: Good air movement bilaterally, lungs clear, no wheezes/rales/rhonchi  GI: Abdomen soft, bowel sounds present, nondistended, nontender, no organomegaly or masses, no rebound/guarding  Neurologic: Alert and oriented, No focal deficits  Psychiatric: normal mentation, affect and mood     Assessment and Plan:  Nausea  For nausea she agreed to an empiric trial with pepcid, this has worked in the past for treating GERD. Also ordered an EGD due to her reported history of ulcers and gastritis. She is concerned about a GI parasite because her mother was diagnosed with one after she visited her, so we will obtain stool O&P.   - GASTROENTEROLOGY ADULT REF PROCEDURE ONLY  - Stool Ova and Parasite  - Stool Ova and Parasite  - Stool Ova and Parasite    Gastroesophageal reflux disease, esophagitis presence not specified  - famotidine (PEPCID) 40 MG tablet  Dispense: 30 tablet; Refill: 1     Follow-up: Return if symptoms worsen or fail to improve.         Scribe Disclosure:   ANAMARIA, Steve Hernandez, am serving as a scribe to document services personally performed by Karina  AMANDA Rivers CNP at this visit, based upon the provider's statements to me. All documentation has been reviewed by the aforementioned provider prior to being entered into the official medical record.     Portions of this medical record were completed by a scribe. UPON MY REVIEW AND AUTHENTICATION BY ELECTRONIC SIGNATURE, this confirms (a) I performed the applicable clinical services, and (b) the record is accurate.     AMANDA Garza CNP

## 2018-09-19 ENCOUNTER — TELEPHONE (OUTPATIENT)
Dept: GASTROENTEROLOGY | Facility: CLINIC | Age: 48
End: 2018-09-19

## 2018-10-11 DIAGNOSIS — R11.0 NAUSEA: ICD-10-CM

## 2018-10-12 LAB
O+P STL MICRO: ABNORMAL
SPECIMEN SOURCE: ABNORMAL

## 2018-10-16 ENCOUNTER — TELEPHONE (OUTPATIENT)
Dept: INTERNAL MEDICINE | Facility: CLINIC | Age: 48
End: 2018-10-16

## 2018-10-16 DIAGNOSIS — A07.8 BLASTOCYSTIS HOMINIS: Primary | ICD-10-CM

## 2018-10-16 NOTE — TELEPHONE ENCOUNTER
[facility] Call Center    Phone Message    May a detailed message be left on voicemail: no    Reason for Call: [facility] that was sent today, metroNIDAZOLE 750 MG TB24. That strenght is not available. Please contact them with a new prescription.      Action Taken: Message routed to:  [facility] ([facility] Primary Care

## 2018-10-16 NOTE — TELEPHONE ENCOUNTER
Spoke with Shannan to review lab results, positive for blastocystis; she is still having nausea. Will treat with 7-d course Metronidazole TID. Reviewed no alcohol while taking this.   AMANDA Garza CNP

## 2018-10-17 RX ORDER — METRONIDAZOLE 500 MG/1
500 TABLET ORAL 3 TIMES DAILY
Qty: 21 TABLET | Refills: 0 | Status: SHIPPED | OUTPATIENT
Start: 2018-10-17 | End: 2018-10-25

## 2018-10-17 NOTE — TELEPHONE ENCOUNTER
Spoke to pharmacy and they do not have the 750mg 24 hour tablet availible for ordering. Wondering if provider would be willing to do 250mg or 500mg. Mechelle Marinelli LPN 10/17/2018 9:56 AM

## 2018-10-22 ENCOUNTER — TELEPHONE (OUTPATIENT)
Dept: INTERNAL MEDICINE | Facility: CLINIC | Age: 48
End: 2018-10-22

## 2018-10-22 NOTE — TELEPHONE ENCOUNTER
[facility] Call Center    Phone Message    May a detailed message be left on voicemail: yes    Reason for Call: Other: Cammy from [facility] calling asking for the upper endoscopy orders to be faxed to [phone].     Action Taken: Message routed to:  [facility] EMELY LORD

## 2018-10-25 ENCOUNTER — OFFICE VISIT (OUTPATIENT)
Dept: OBGYN | Facility: CLINIC | Age: 48
End: 2018-10-25
Attending: OBSTETRICS & GYNECOLOGY
Payer: COMMERCIAL

## 2018-10-25 VITALS
DIASTOLIC BLOOD PRESSURE: 66 MMHG | HEIGHT: 62 IN | BODY MASS INDEX: 24.11 KG/M2 | HEART RATE: 58 BPM | WEIGHT: 131 LBS | SYSTOLIC BLOOD PRESSURE: 100 MMHG

## 2018-10-25 DIAGNOSIS — Z00.00 PREVENTATIVE HEALTH CARE: Primary | ICD-10-CM

## 2018-10-25 DIAGNOSIS — N95.1 MENOPAUSAL HOT FLUSHES: ICD-10-CM

## 2018-10-25 DIAGNOSIS — Z13.820 SCREENING FOR OSTEOPOROSIS: ICD-10-CM

## 2018-10-25 PROCEDURE — G0463 HOSPITAL OUTPT CLINIC VISIT: HCPCS | Mod: ZF

## 2018-10-25 RX ORDER — ESTRADIOL 0.05 MG/D
1 PATCH, EXTENDED RELEASE TRANSDERMAL
Qty: 24 PATCH | Refills: 3 | Status: SHIPPED | OUTPATIENT
Start: 2018-10-25 | End: 2019-12-23

## 2018-10-25 NOTE — LETTER
10/25/2018       RE: Shannan Harris  [address]  [facility] [address]     Dear Colleague,    Thank you for referring your patient, Shannan Harris, to the [facility] at [facility]. Please see a copy of my visit note below.    CC/HPI:   Shannan Harris is a 48 year old female  who presents today for her annual exam. 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 Underwent menopause ~46yo. Wonders how long she should take HRT. Did have fracture and DEXA showing osteoporosis in 2016. Otherwise is doing well. Happy in marriage, has 4 year old conceived with gestational carrier. Was just promoted at work and it Tenured.     HISTORIES:  Patient Active Problem List   Diagnosis     Melanocytic nevus     SK (seborrheic keratosis)     History of basal cell carcinoma     Hot flashes     Neoplasm of uncertain behavior of skin     Tinea pedis of both feet     Nondisplaced fracture of fifth left metatarsal bone with routine healing     Left foot pain     History of basal cell cancer     Tinea pedis, unspecified laterality     Nasal obstruction     Deviated nasal septum     Nasal valve collapse     Past Medical History:   Diagnosis Date     Gastroesophageal reflux disease      Hot flashes      Past Surgical History:   Procedure Laterality Date     BIOPSY OF SKIN LESION       HYSTERECTOMY  2009    for uterine myoma and painful periods; has ovaries     Current Outpatient Prescriptions   Medication Sig Dispense Refill     estradiol (VIVELLE-DOT) 0.05 MG/24HR BIW patch Place 1 patch onto the skin twice a week 24 patch 3     famotidine (PEPCID) 40 MG tablet Take 1 tablet (40 mg) by mouth At Bedtime 30 tablet 1     Allergies   Allergen Reactions     Nkda [No Known Drug Allergies]      Social History     Social History     Marital status: Significant other     Spouse name: N/A     Number of children: 0     Years of education: N/A     Occupational History     research  "[employer]     Social History Main Topics     Smoking status: Never Smoker     Smokeless tobacco: Never Used     Alcohol use No     Drug use: No     Sexual activity: Yes     Partners: Male     Birth control/ protection: Surgical      Comment: hyst     Other Topics Concern     Not on file     Social History Narrative    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.    One son, born by surrogacy in [place] in 2014.    Getting  in June 2017.     Family History   Problem Relation Age of Onset     Cancer Mother      BCC     Cancer Brother      BCC     Brain Cancer Paternal Uncle      Pancreatic Cancer Paternal Uncle      Cancer - colorectal No family hx of      Breast Cancer No family hx of      Skin Cancer No family hx of      Melanoma No family hx of           Gyn Hx:   No LMP recorded. Patient has had a hysterectomy.    EXAM:  /66  Pulse 58  Ht 1.57 m (5' 1.81\")  Wt 59.4 kg (131 lb)  BMI 24.11 kg/m2  Body mass index is 24.11 kg/(m^2).    General - pleasant female in no acute distress.  Skin - no suspicious lesions or rashes  EENT-  PERRLA, euthyroid with out palpable nodules  Neck - supple without lymphadenopathy.  Lungs - clear to auscultation bilaterally.  Heart - regular rate and rhythm without murmur.  Breasts- symmetrical . No dominant fixed or suspicious masses noted.  No skin or nipple changes or axillary nodes. Self exam is taught and encouraged monthly.  Abdomen - soft, nontender, nondistended, no masses or organomegaly noted.  Musculoskeletal - no gross deformities.  Neurological - normal strength, sensation, and mental status.  Pelvic - EG: normal  female, vulva reveals no erythema or lesions.   BUS: within normal limits.  Vagina: shortened, no lesions  Cervix: surgically absent  Uterus: surgically absent  Adnexa: no masses or tenderness.  Anus- normal, no lesions.  Rectovaginal - deferred.    ASSESSMENT/PLAN  (Z13.820) Screening for osteoporosis  (primary encounter diagnosis)  Plan: Dexa " hip/pelvis/spine    (N95.1) Menopausal hot flushes  Plan: estradiol (VIVELLE-DOT) 0.05 MG/24HR BIW patch    Reviewed use of estrogen until average age of menopause (~51). Is happy on estrogen, will also improve bone health.       Additional teaching done at this visit regarding calcium (1200 mg per day) and exercise. I have discussed with patient the harms and  benefits of  medications, treatment options.     The patient will be notified of any results via [employer].    Nanette Blanton MD

## 2018-10-25 NOTE — MR AVS SNAPSHOT
"              After Visit Summary   10/25/2018    Shannan Harris    MRN: [id number]           Patient Information     Date Of Birth          1970        Visit Information        Provider Department      10/25/2018 3:00 PM Nanette Blanton MD [facility]        Today's Diagnoses     Screening for osteoporosis    -  1    Menopausal hot flushes           Follow-ups after your visit        Who to contact     Please call your clinic at [phone] to:    Ask questions about your health    Make or cancel appointments    Discuss your medicines    Learn about your test results    Speak to your doctor            Additional Information About Your Visit        MyChart Information     [employer] gives you secure access to your electronic health record. If you see a primary care provider, you can also send messages to your care team and make appointments. If you have questions, please call your primary care clinic.  If you do not have a primary care provider, please call [phone] and they will assist you.      [employer] is an electronic gateway that provides easy, online access to your medical records. With [employer], you can request a clinic appointment, read your test results, renew a prescription or communicate with your care team.     To access your existing account, please contact your [facility] or call [phone] for assistance.        Care EveryWhere ID     This is your Care EveryWhere ID. This could be used by other organizations to access your [facility] medical records  [id number]        Your Vitals Were     Pulse Height BMI (Body Mass Index)             58 1.57 m (5' 1.81\") 24.11 kg/m2          Blood Pressure from Last 3 Encounters:   10/25/18 100/66   09/18/18 101/65   10/05/17 99/63    Weight from Last 3 Encounters:   10/25/18 59.4 kg (131 lb)   09/18/18 60.1 kg (132 lb 6.4 oz)   01/24/18 56.7 kg (125 lb)                 Today's Medication Changes       "    These changes are accurate as of 10/25/18 11:59 PM.  If you have any questions, ask your nurse or doctor.               Stop taking these medicines if you haven't already. Please contact your care team if you have questions.     metroNIDAZOLE 500 MG tablet   Commonly known as:  FLAGYL   Stopped by:  Nanette Blanton MD           metroNIDAZOLE 750 MG Tb24   Stopped by:  Nanette Blanton MD                Where to get your medicines      These medications were sent to [facility] [address] [place] [address] [address] AT [facility] & [place] [address], [facility] [address]     Phone:  [phone]     estradiol 0.05 MG/24HR BIW patch                Primary Care Provider Office Phone # Fax #    Karina AMANDA [facility] [phone]       [address] [facility] [address]        Equal Access to Services     [facility] : Hadii radha ku hadasho Soomaali, waaxda luqadaha, qaybta kaalmada adeegyada, waxay karimein hayeneidan ron rod . So [facility] [phone].    ATENCIÓN: Si habla español, tiene a hicks disposición servicios gratuitos de asistencia lingüística. [facility] [phone].    We comply with applicable federal civil rights laws and [place] laws. We do not discriminate on the basis of race, color, national origin, age, disability, sex, sexual orientation, or gender identity.            Thank you!     Thank you for choosing [facility]  for your care. Our goal is always to provide you with excellent care. Hearing back from our patients is one way we can continue to improve our services. Please take a few minutes to complete the written survey that you may receive in the mail after your visit with us. Thank you!             Your Updated Medication List - Protect others around you: Learn how to safely use, store and throw away your medicines at www.disposemymeds.org.          This list is accurate as of 10/25/18 11:59 PM.   Always use your most recent med list.                   Brand Name Dispense Instructions for use Diagnosis    estradiol 0.05 MG/24HR BIW patch    VIVELLE-DOT    24 patch    Place 1 patch onto the skin twice a week    Menopausal hot flushes       famotidine 40 MG tablet    PEPCID    30 tablet    Take 1 tablet (40 mg) by mouth At Bedtime    Gastroesophageal reflux disease, esophagitis presence not specified

## 2018-10-29 NOTE — PROGRESS NOTES
CC/HPI:   Shannan Harris is a 48 year old female  who presents today for her annual exam. 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 Underwent menopause ~44yo. Wonders how long she should take HRT. Did have fracture and DEXA showing osteoporosis in 2016. Otherwise is doing well. Happy in marriage, has 4 year old conceived with gestational carrier. Was just promoted at work and it Tenured.     HISTORIES:  Patient Active Problem List   Diagnosis     Melanocytic nevus     SK (seborrheic keratosis)     History of basal cell carcinoma     Hot flashes     Neoplasm of uncertain behavior of skin     Tinea pedis of both feet     Nondisplaced fracture of fifth left metatarsal bone with routine healing     Left foot pain     History of basal cell cancer     Tinea pedis, unspecified laterality     Nasal obstruction     Deviated nasal septum     Nasal valve collapse     Past Medical History:   Diagnosis Date     Gastroesophageal reflux disease      Hot flashes      Past Surgical History:   Procedure Laterality Date     BIOPSY OF SKIN LESION       HYSTERECTOMY  2009    for uterine myoma and painful periods; has ovaries     Current Outpatient Prescriptions   Medication Sig Dispense Refill     estradiol (VIVELLE-DOT) 0.05 MG/24HR BIW patch Place 1 patch onto the skin twice a week 24 patch 3     famotidine (PEPCID) 40 MG tablet Take 1 tablet (40 mg) by mouth At Bedtime 30 tablet 1     Allergies   Allergen Reactions     Nkda [No Known Drug Allergies]      Social History     Social History     Marital status: Significant other     Spouse name: N/A     Number of children: 0     Years of education: N/A     Occupational History     research [employer]     Social History Main Topics     Smoking status: Never Smoker     Smokeless tobacco: Never Used     Alcohol use No     Drug use: No     Sexual activity: Yes     Partners: Male     Birth control/ protection: Surgical      Comment: hyst     Other Topics Concern  "    Not on file     Social History Narrative    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    One son, born by surrogacy in [place] in 2014.    Getting  in June 2017.     Family History   Problem Relation Age of Onset     Cancer Mother      BCC     Cancer Brother      BCC     Brain Cancer Paternal Uncle      Pancreatic Cancer Paternal Uncle      Cancer - colorectal No family hx of      Breast Cancer No family hx of      Skin Cancer No family hx of      Melanoma No family hx of           Gyn Hx:   No LMP recorded. Patient has had a hysterectomy.      Review Of Systems:  CONSTITUTIONAL: NEGATIVE for fever, chills  EYES: NEGATIVE for vision changes   RESP: NEGATIVE for significant cough or SOB  CV: NEGATIVE for chest pain, palpitations   GI: NEGATIVE for nausea, abdominal pain, heartburn, or change in bowel habits  : NEGATIVE for frequency, dysuria, or hematuria  MUSCULOSKELETAL: NEGATIVE for significant arthralgias or myalgia  NEURO: NEGATIVE for weakness, dizziness or paresthesias or headache    EXAM:  /66  Pulse 58  Ht 1.57 m (5' 1.81\")  Wt 59.4 kg (131 lb)  BMI 24.11 kg/m2  Body mass index is 24.11 kg/(m^2).    General - pleasant female in no acute distress.  Skin - no suspicious lesions or rashes  EENT-  PERRLA, euthyroid with out palpable nodules  Neck - supple without lymphadenopathy.  Lungs - clear to auscultation bilaterally.  Heart - regular rate and rhythm without murmur.  Breasts- symmetrical . No dominant fixed or suspicious masses noted.  No skin or nipple changes or axillary nodes. Self exam is taught and encouraged monthly.  Abdomen - soft, nontender, nondistended, no masses or organomegaly noted.  Musculoskeletal - no gross deformities.  Neurological - normal strength, sensation, and mental status.  Pelvic - EG: normal  female, vulva reveals no erythema or lesions.   BUS: within normal limits.  Vagina: shortened, no lesions  Cervix: surgically absent  Uterus: surgically absent  Adnexa: no " masses or tenderness.  Anus- normal, no lesions.  Rectovaginal - deferred.    ASSESSMENT/PLAN  (Z13.820) Screening for osteoporosis  (primary encounter diagnosis)  Plan: Dexa hip/pelvis/spine    (N95.1) Menopausal hot flushes  Plan: estradiol (VIVELLE-DOT) 0.05 MG/24HR BIW patch    Reviewed use of estrogen until average age of menopause (~51). Is happy on estrogen, will also improve bone health.       Additional teaching done at this visit regarding calcium (1200 mg per day) and exercise. I have discussed with patient the harms and  benefits of  medications, treatment options.     The patient will be notified of any results via [employer].    Nanette Blanton MD

## 2018-11-09 ENCOUNTER — TRANSFERRED RECORDS (OUTPATIENT)
Dept: HEALTH INFORMATION MANAGEMENT | Facility: CLINIC | Age: 48
End: 2018-11-09

## 2018-11-12 ENCOUNTER — TRANSFERRED RECORDS (OUTPATIENT)
Dept: HEALTH INFORMATION MANAGEMENT | Facility: CLINIC | Age: 48
End: 2018-11-12

## 2018-11-20 NOTE — TELEPHONE ENCOUNTER
FUTURE VISIT INFORMATION      FUTURE VISIT INFORMATION:    Date: 11/26/18    Time:     Location: [facility]  REFERRAL INFORMATION:    Referring provider:  self    Referring providers clinic:      Reason for visit/diagnosis  L hand pointer finger fracture, no outside records or imaging, appt per pt, appt ok per Jean-Pierre    RECORDS REQUESTED FROM:       Clinic name Comments Records Status Imaging Status     internal

## 2018-11-26 ENCOUNTER — RADIANT APPOINTMENT (OUTPATIENT)
Dept: GENERAL RADIOLOGY | Facility: CLINIC | Age: 48
End: 2018-11-26
Payer: COMMERCIAL

## 2018-11-26 ENCOUNTER — PRE VISIT (OUTPATIENT)
Dept: ORTHOPEDICS | Facility: CLINIC | Age: 48
End: 2018-11-26

## 2018-11-26 ENCOUNTER — OFFICE VISIT (OUTPATIENT)
Dept: ORTHOPEDICS | Facility: CLINIC | Age: 48
End: 2018-11-26
Payer: COMMERCIAL

## 2018-11-26 VITALS — BODY MASS INDEX: 24.11 KG/M2 | WEIGHT: 131 LBS | RESPIRATION RATE: 16 BRPM | HEIGHT: 62 IN

## 2018-11-26 DIAGNOSIS — S62.609A FINGER FRACTURE, LEFT: ICD-10-CM

## 2018-11-26 DIAGNOSIS — S63.631A SPRAIN OF INTERPHALANGEAL JOINT OF LEFT INDEX FINGER, INITIAL ENCOUNTER: Primary | ICD-10-CM

## 2018-11-26 DIAGNOSIS — S62.609A FINGER FRACTURE, LEFT: Primary | ICD-10-CM

## 2018-11-26 NOTE — LETTER
11/26/2018      RE: Shannan Harris  [address]  [facility] [address]        Subjective:   Shannan Harris is a 48 year old RHD female who presents with 2 months of L index finger pain.  She had caught her finger  and loaded it causing the injury buy symptoms have continued for 2 months and she is concerned she could have a fracture. No numbness or tingling.    Occupation: She does medical research.    Background:   Date of injury: NA   Duration of symptoms: 2 months  Mechanism of Injury: Acute; Activity Related trauma   Aggravating factors: AROM, cooking, typing   Relieving Factors: massage, heat  Prior Evaluation: None    PAST MEDICAL, SOCIAL, SURGICAL AND FAMILY HISTORY: She  has a past medical history of Gastroesophageal reflux disease and Hot flashes.  She  has a past surgical history that includes Hysterectomy (2009) and biopsy of skin lesion.  Her family history includes Brain Cancer in her paternal uncle; Cancer in her brother and mother; Pancreatic Cancer in her paternal uncle. There is no history of Cancer - colorectal, Breast Cancer, Skin Cancer, or Melanoma.  She reports that she has never smoked. She has never used smokeless tobacco. She reports that she does not drink alcohol or use illicit drugs.    ALLERGIES: She is allergic to nkda [no known drug allergies].    CURRENT MEDICATIONS: She has a current medication list which includes the following prescription(s): estradiol and famotidine.     REVIEW OF SYSTEMS: 3 point review of systems is negative except as noted above.     Exam:   There were no vitals taken for this visit.     CONSTITUTIONAL: healthy, alert and no distress  HEAD: Normocephalic. No masses, lesions, tenderness or abnormalities  SKIN: no suspicious lesions or rashes  GAIT: normal  NEUROLOGIC: Non-focal  PSYCHIATRIC: affect normal/bright and mentation appears normal.    MUSCULOSKELETAL:   L index finger, mild swelling at the PIP. Painful but full PROM of the PIP including flexion and  ext. No pain or opening with varus or valgus stress. 5/5 strength with pain with reissted PRP and DIP flexion and resisted DIP and PIP ext.    Xray L index finger without evidence of fracture or degenerative change.     Assessment/Plan:   L index finger PIP sprain at 2 months  --she continues to have pain though flexibility is improving. She was worried about fracture given her hx of osteopenia. She is reassured that there is no instability on exam.  --we discussed a trial of maty taping, ROM and strengthening and I offered hand therapy evaluation for support. She declined this and will start with maty taping rom and strengthening on her own. She can f/u if not improving.    Andrés Cisse MD CAQ

## 2018-11-26 NOTE — PROGRESS NOTES
Subjective:   Shannan Harris is a 48 year old RHD female who presents with 2 months of L index finger pain.  She had caught her finger  and loaded it causing the injury buy symptoms have continued for 2 months and she is concerned she could have a fracture. No numbness or tingling.    Occupation: She does medical research.    Background:   Date of injury: NA   Duration of symptoms: 2 months  Mechanism of Injury: Acute; Activity Related trauma   Aggravating factors: AROM, cooking, typing   Relieving Factors: massage, heat  Prior Evaluation: None    PAST MEDICAL, SOCIAL, SURGICAL AND FAMILY HISTORY: She  has a past medical history of Gastroesophageal reflux disease and Hot flashes.  She  has a past surgical history that includes Hysterectomy (2009) and biopsy of skin lesion.  Her family history includes Brain Cancer in her paternal uncle; Cancer in her brother and mother; Pancreatic Cancer in her paternal uncle. There is no history of Cancer - colorectal, Breast Cancer, Skin Cancer, or Melanoma.  She reports that she has never smoked. She has never used smokeless tobacco. She reports that she does not drink alcohol or use illicit drugs.    ALLERGIES: She is allergic to nkda [no known drug allergies].    CURRENT MEDICATIONS: She has a current medication list which includes the following prescription(s): estradiol and famotidine.     REVIEW OF SYSTEMS: 3 point review of systems is negative except as noted above.     Exam:   There were no vitals taken for this visit.     CONSTITUTIONAL: healthy, alert and no distress  HEAD: Normocephalic. No masses, lesions, tenderness or abnormalities  SKIN: no suspicious lesions or rashes  GAIT: normal  NEUROLOGIC: Non-focal  PSYCHIATRIC: affect normal/bright and mentation appears normal.    MUSCULOSKELETAL:   L index finger, mild swelling at the PIP. Painful but full PROM of the PIP including flexion and ext. No pain or opening with varus or valgus stress. 5/5 strength with pain with  reissted PRP and DIP flexion and resisted DIP and PIP ext.    Xray L index finger without evidence of fracture or degenerative change.     Assessment/Plan:   L index finger PIP sprain at 2 months  --she continues to have pain though flexibility is improving. She was worried about fracture given her hx of osteopenia. She is reassured that there is no instability on exam.  --we discussed a trial of maty taping, ROM and strengthening and I offered hand therapy evaluation for support. She declined this and will start with maty taping rom and strengthening on her own. She can f/u if not improving.    Andrés Cisse MD CAQ

## 2018-11-26 NOTE — MR AVS SNAPSHOT
"              After Visit Summary   11/26/2018    Shannan Harris    MRN: [id number]           Patient Information     Date Of Birth          1970        Visit Information        Provider Department      11/26/2018 1:00 PM Andrés Cisse MD [facility]        Today's Diagnoses     Sprain of interphalangeal joint of left index finger, initial encounter    -  1       Follow-ups after your visit        Who to contact     Please call your clinic at [phone] to:    Ask questions about your health    Make or cancel appointments    Discuss your medicines    Learn about your test results    Speak to your doctor            Additional Information About Your Visit        MyChart Information     [employer] gives you secure access to your electronic health record. If you see a primary care provider, you can also send messages to your care team and make appointments. If you have questions, please call your primary care clinic.  If you do not have a primary care provider, please call [phone] and they will assist you.      [employer] is an electronic gateway that provides easy, online access to your medical records. With [employer], you can request a clinic appointment, read your test results, renew a prescription or communicate with your care team.     To access your existing account, please contact your [facility] or call [phone] for assistance.        Care EveryWhere ID     This is your Care EveryWhere ID. This could be used by other organizations to access your [facility] medical records  [id number]        Your Vitals Were     Respirations Height BMI (Body Mass Index)             16 5' 1.81\" (1.57 m) 24.11 kg/m2          Blood Pressure from Last 3 Encounters:   10/25/18 100/66   09/18/18 101/65   10/05/17 99/63    Weight from Last 3 Encounters:   11/26/18 131 lb (59.4 kg)   10/25/18 131 lb (59.4 kg)   09/18/18 132 lb 6.4 oz (60.1 kg)              Today, you had the " following     No orders found for display       Primary Care Provider Office Phone # Fax #    Karina Silva, AMANDA [facility] [phone]       [address] [facility] [address]        Equal Access to Services     KHAI DINH : Hadii aad ku hadmyao Soomaali, waaxda luqadaha, qaybta kaalmada adeegyada, waxnakita idiin hayeneidan adeana luisa boyer marcel huntley. So [facility] [phone].    ATENCIÓN: Si habla español, tiene a hicks disposición servicios gratuitos de asistencia lingüística. Llame al [phone].    We comply with applicable federal civil rights laws and [place] laws. We do not discriminate on the basis of race, color, national origin, age, disability, sex, sexual orientation, or gender identity.            Thank you!     Thank you for choosing [facility]  for your care. Our goal is always to provide you with excellent care. Hearing back from our patients is one way we can continue to improve our services. Please take a few minutes to complete the written survey that you may receive in the mail after your visit with us. Thank you!             Your Updated Medication List - Protect others around you: Learn how to safely use, store and throw away your medicines at www.disposemymeds.org.          This list is accurate as of 11/26/18 11:59 PM.  Always use your most recent med list.                   Brand Name Dispense Instructions for use Diagnosis    estradiol 0.05 MG/24HR bi-weekly patch    VIVELLE-DOT    24 patch    Place 1 patch onto the skin twice a week    Menopausal hot flushes       famotidine 40 MG tablet    PEPCID    30 tablet    Take 1 tablet (40 mg) by mouth At Bedtime    Gastroesophageal reflux disease, esophagitis presence not specified

## 2018-12-06 ENCOUNTER — TRANSFERRED RECORDS (OUTPATIENT)
Dept: HEALTH INFORMATION MANAGEMENT | Facility: CLINIC | Age: 48
End: 2018-12-06

## 2019-02-01 ENCOUNTER — ANCILLARY PROCEDURE (OUTPATIENT)
Dept: MAMMOGRAPHY | Facility: CLINIC | Age: 49
End: 2019-02-01
Payer: COMMERCIAL

## 2019-02-01 DIAGNOSIS — Z12.31 VISIT FOR SCREENING MAMMOGRAM: ICD-10-CM

## 2019-03-01 ENCOUNTER — OFFICE VISIT (OUTPATIENT)
Dept: DERMATOLOGY | Facility: CLINIC | Age: 49
End: 2019-03-01
Payer: COMMERCIAL

## 2019-03-01 DIAGNOSIS — Z85.828 HISTORY OF BASAL CELL CARCINOMA: ICD-10-CM

## 2019-03-01 DIAGNOSIS — D48.5 NEOPLASM OF UNCERTAIN BEHAVIOR OF SKIN: Primary | ICD-10-CM

## 2019-03-01 DIAGNOSIS — D22.9 MULTIPLE BENIGN NEVI: ICD-10-CM

## 2019-03-01 DIAGNOSIS — D18.01 CHERRY ANGIOMA: ICD-10-CM

## 2019-03-01 DIAGNOSIS — Z12.83 SKIN CANCER SCREENING: ICD-10-CM

## 2019-03-01 DIAGNOSIS — Z86.018 HISTORY OF DYSPLASTIC NEVUS: ICD-10-CM

## 2019-03-01 ASSESSMENT — PAIN SCALES - GENERAL: PAINLEVEL: NO PAIN (0)

## 2019-03-01 NOTE — NURSING NOTE
"Dermatology Rooming Note    Shannan Harris's goals for this visit include:   Chief Complaint   Patient presents with     Skin Check     Skin check, Shannan states \" I have a few new spots.\"      Hilda Stock LPN   "

## 2019-03-01 NOTE — LETTER
"3/1/2019       RE: Shannan Harris  [address]  [place] [address]     Dear Colleague,    Thank you for referring your patient, Shannan Harris, to the [facility] DERMATOLOGY at [facility]. Please see a copy of my visit note below.    [facility] Dermatology Note      Dermatology Problem List:  1.BCC's:  -Left upper chest status post ED&C.    -Left abdomen.    -Right flank.    2. Tinea pedis  3. Moderate to severely dysplastic nevus (central upper back)  - s/p excision 8/11/2016  4. NUB, left lower thigh, pending bx results   5. Skin cancer screening 3/1/19     Encounter Date: Mar 1, 2019    CC:  Chief Complaint   Patient presents with     Skin Check     Skin check, Shannan states \" I have a few new spots.\"          History of Present Illness:  Ms. Shannan Harris is a 49 year old female who presents today for a total body skin exam. The patient was last seen by Dr. [facility] on 7/17/18 when she was recommended zinc containing shampoo for seborrheic dermatitis. At today's visit, she reports one lesion on her left thigh. She often scratches at this. She reports no painful, bleeding, nonhealing, or pruritic lesions, and denies new or changing moles. She is otherwise feeling well.       Past Medical History:   Patient Active Problem List   Diagnosis     Melanocytic nevus     SK (seborrheic keratosis)     History of basal cell carcinoma     Hot flashes     Neoplasm of uncertain behavior of skin     Tinea pedis of both feet     Nondisplaced fracture of fifth left metatarsal bone with routine healing     Left foot pain     History of basal cell cancer     Tinea pedis, unspecified laterality     Nasal obstruction     Deviated nasal septum     Nasal valve collapse     Past Medical History:   Diagnosis Date     Gastroesophageal reflux disease      Hot flashes      Past Surgical History:   Procedure Laterality Date     BIOPSY OF SKIN LESION       HYSTERECTOMY  2009    for " uterine myoma and painful periods; has ovaries       Social History:  Social History     Socioeconomic History     Marital status: Significant other     Spouse name: None     Number of children: 0     Years of education: None     Highest education level: None   Occupational History     Occupation: research     Employer: [employer]   Social Needs     Financial resource strain: None     Food insecurity:     Worry: None     Inability: None     Transportation needs:     Medical: None     Non-medical: None   Tobacco Use     Smoking status: Never Smoker     Smokeless tobacco: Never Used   Substance and Sexual Activity     Alcohol use: No     Alcohol/week: 0.0 oz     Drug use: No     Sexual activity: Yes     Partners: Male     Birth control/protection: Surgical     Comment: hyst   Lifestyle     Physical activity:     Days per week: None     Minutes per session: None     Stress: None   Relationships     Social connections:     Talks on phone: None     Gets together: None     Attends [employer] service: None     Active member of club or organization: None     Attends meetings of clubs or organizations: None     Relationship status: None     Intimate partner violence:     Fear of current or ex partner: None     Emotionally abused: None     Physically abused: None     Forced sexual activity: None   Other Topics Concern     Parent/sibling w/ CABG, MI or angioplasty before 65F 55M? 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    One son, born by surrogacy in [place] in 2014.    Getting  in June 2017.       Family History:  Family History   Problem Relation Age of Onset     Cancer Mother         BCC     Cancer Brother         BCC     Brain Cancer Paternal Uncle      Pancreatic Cancer Paternal Uncle      Cancer - colorectal No family hx of      Breast Cancer No family hx of      Skin Cancer No family hx of      Melanoma No family hx of        Medications:  Current Outpatient  Medications   Medication Sig Dispense Refill     estradiol (VIVELLE-DOT) 0.05 MG/24HR BIW patch Place 1 patch onto the skin twice a week 24 patch 3     famotidine (PEPCID) 40 MG tablet Take 1 tablet (40 mg) by mouth At Bedtime 30 tablet 1       Allergies   Allergen Reactions     Nkda [No Known Drug Allergies]        Review of Systems:  -Skin Establ Pt: The patient denies any new rash, pruritus, or lesions that are symptomatic, changing or bleeding, except as per HPI.  -Constitutional: The patient is feeling generally well.    Physical exam:  Vitals: There were no vitals taken for this visit.  GEN: This is a well developed, well-nourished female in no acute distress, in a pleasant mood.    SKIN: Total skin excluding the undergarment areas was performed. The exam included the head/face, neck, both arms, chest, back, abdomen, both legs, digits and/or nails.   - 3 mm hyperpigmented / violaceous papule on the left lower thigh.    - Dome shaped bright red papules on the trunk.   - Multiple regular brown pigmented macules and papules are identified on the trunk and extremities.   - Well healed surgical scars without erythema, nodularity or telangiectasias.  - No other lesions of concern on areas examined.       Impression/Plan:  1. History of basal cell carcinoma and dysplastic nevus    No clinical evidence of recurrence.    Continue periodic skin exams and report of new or changing lesions.     Recommend sunscreens SPF #30 or greater, protective clothing and avoidance of tanning beds.    2. Neoplasm of uncertain behavior - left lower thigh. DDx includes traumatized angioma vs MM vs other.     After discussion of benefits and risks including but not limited to bleeding, infection, scar, incomplete removal, recurrence, and non-diagnostic biopsy, written consent and photographs were obtained. The area was cleaned with isopropyl alcohol. 0.5 mL of 1% lidocaine with epinephrine was injected to obtain adequate anesthesia of the  lesion on the left lower thigh. A shave biopsy was performed. Hemostasis was achieved with aluminium chloride. Vaseline and a sterile dressing were applied. The patient tolerated the procedure and no complications were noted. The patient was provided with verbal and written post care instructions.     3. Clinically benign nevi - trunk and extremities     ABCDs of melanoma were discussed and self skin checks were advised.     4. Cherry angiomas - trunk    Benign nature reassured. No further intervention required.     Follow-up in 1 year, earlier for new or changing lesions.       Staff Involved:  Staff Only    Scribe Disclosure:  Jignesh CONRAD, am serving as a scribe to document services personally performed by Patricia Jamison PA-C, based on data collection and the provider's statements to me.     Provider Disclosure:   The documentation recorded by the scribe accurately reflects the services I personally performed and the decisions made by me.    All risks, benefits and alternatives were discussed with patient.  Patient is in agreement and understands the assessment and plan.  All questions were answered.  Sun Screen Education was given.   Return to Clinic annually or sooner as needed.   Patricia Jamison PA-C   [facility]

## 2019-03-01 NOTE — PROGRESS NOTES
"[facility] Dermatology Note      Dermatology Problem List:  1.BCC's:  -Left upper chest status post ED&C.    -Left abdomen.    -Right flank.    2. Tinea pedis  3. Moderate to severely dysplastic nevus (central upper back)  - s/p excision 8/11/2016  4. NUB, left lower thigh, pending bx results   5. Skin cancer screening 3/1/19     Encounter Date: Mar 1, 2019    CC:  Chief Complaint   Patient presents with     Skin Check     Skin check, Shannan states \" I have a few new spots.\"          History of Present Illness:  Ms. Shannan Harris is a 49 year old female who presents today for a total body skin exam. The patient was last seen by Dr. Wayne on 7/17/18 when she was recommended zinc containing shampoo for seborrheic dermatitis. At today's visit, she reports one lesion on her left thigh. She often scratches at this. She reports no painful, bleeding, nonhealing, or pruritic lesions, and denies new or changing moles. She is otherwise feeling well.       Past Medical History:   Patient Active Problem List   Diagnosis     Melanocytic nevus     SK (seborrheic keratosis)     History of basal cell carcinoma     Hot flashes     Neoplasm of uncertain behavior of skin     Tinea pedis of both feet     Nondisplaced fracture of fifth left metatarsal bone with routine healing     Left foot pain     History of basal cell cancer     Tinea pedis, unspecified laterality     Nasal obstruction     Deviated nasal septum     Nasal valve collapse     Past Medical History:   Diagnosis Date     Gastroesophageal reflux disease      Hot flashes      Past Surgical History:   Procedure Laterality Date     BIOPSY OF SKIN LESION       HYSTERECTOMY  2009    for uterine myoma and painful periods; has ovaries       Social History:  Social History     Socioeconomic History     Marital status: Significant other     Spouse name: None     Number of children: 0     Years of education: None     Highest education level: None   Occupational " History     Occupation: research     Employer: [employer]   Social Needs     Financial resource strain: None     Food insecurity:     Worry: None     Inability: None     Transportation needs:     Medical: None     Non-medical: None   Tobacco Use     Smoking status: Never Smoker     Smokeless tobacco: Never Used   Substance and Sexual Activity     Alcohol use: No     Alcohol/week: 0.0 oz     Drug use: No     Sexual activity: Yes     Partners: Male     Birth control/protection: Surgical     Comment: hyst   Lifestyle     Physical activity:     Days per week: None     Minutes per session: None     Stress: None   Relationships     Social connections:     Talks on phone: None     Gets together: None     Attends Hinduism service: None     Active member of club or organization: None     Attends meetings of clubs or organizations: None     Relationship status: None     Intimate partner violence:     Fear of current or ex partner: None     Emotionally abused: None     Physically abused: None     Forced sexual activity: None   Other Topics Concern     Parent/sibling w/ CABG, MI or angioplasty before 65F 55M? 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.    One son, born by surrogacy in [place] in 2014.    Getting  in June 2017.       Family History:  Family History   Problem Relation Age of Onset     Cancer Mother         BCC     Cancer Brother         BCC     Brain Cancer Paternal Uncle      Pancreatic Cancer Paternal Uncle      Cancer - colorectal No family hx of      Breast Cancer No family hx of      Skin Cancer No family hx of      Melanoma No family hx of        Medications:  Current Outpatient Medications   Medication Sig Dispense Refill     estradiol (VIVELLE-DOT) 0.05 MG/24HR BIW patch Place 1 patch onto the skin twice a week 24 patch 3     famotidine (PEPCID) 40 MG tablet Take 1 tablet (40 mg) by mouth At Bedtime 30 tablet 1       Allergies   Allergen Reactions     Nkda  [No Known Drug Allergies]        Review of Systems:  -Skin Establ Pt: The patient denies any new rash, pruritus, or lesions that are symptomatic, changing or bleeding, except as per HPI.  -Constitutional: The patient is feeling generally well.    Physical exam:  Vitals: There were no vitals taken for this visit.  GEN: This is a well developed, well-nourished female in no acute distress, in a pleasant mood.    SKIN: Total skin excluding the undergarment areas was performed. The exam included the head/face, neck, both arms, chest, back, abdomen, both legs, digits and/or nails.   - 3 mm hyperpigmented / violaceous papule on the left lower thigh.    - Dome shaped bright red papules on the trunk.   - Multiple regular brown pigmented macules and papules are identified on the trunk and extremities.   - Well healed surgical scars without erythema, nodularity or telangiectasias.  - No other lesions of concern on areas examined.       Impression/Plan:  1. History of basal cell carcinoma and dysplastic nevus    No clinical evidence of recurrence.    Continue periodic skin exams and report of new or changing lesions.     Recommend sunscreens SPF #30 or greater, protective clothing and avoidance of tanning beds.    2. Neoplasm of uncertain behavior - left lower thigh. DDx includes traumatized angioma vs MM vs other.     After discussion of benefits and risks including but not limited to bleeding, infection, scar, incomplete removal, recurrence, and non-diagnostic biopsy, written consent and photographs were obtained. The area was cleaned with isopropyl alcohol. 0.5 mL of 1% lidocaine with epinephrine was injected to obtain adequate anesthesia of the lesion on the left lower thigh. A shave biopsy was performed. Hemostasis was achieved with aluminium chloride. Vaseline and a sterile dressing were applied. The patient tolerated the procedure and no complications were noted. The patient was provided with verbal and written post care  instructions.     3. Clinically benign nevi - trunk and extremities     ABCDs of melanoma were discussed and self skin checks were advised.     4. Cherry angiomas - trunk    Benign nature reassured. No further intervention required.     Follow-up in 1 year, earlier for new or changing lesions.       Staff Involved:  Staff Only    Scribe Disclosure:  I, Jignesh Barreto, am serving as a scribe to document services personally performed by Patricia Jamison PA-C, based on data collection and the provider's statements to me.     Provider Disclosure:   The documentation recorded by the scribe accurately reflects the services I personally performed and the decisions made by me.    All risks, benefits and alternatives were discussed with patient.  Patient is in agreement and understands the assessment and plan.  All questions were answered.  Sun Screen Education was given.   Return to Clinic annually or sooner as needed.   Patricia Jamison PA-C   [facility]

## 2019-03-06 LAB — COPATH REPORT: NORMAL

## 2019-03-11 DIAGNOSIS — K21.9 GASTROESOPHAGEAL REFLUX DISEASE, ESOPHAGITIS PRESENCE NOT SPECIFIED: ICD-10-CM

## 2019-03-13 RX ORDER — FAMOTIDINE 40 MG/1
40 TABLET, FILM COATED ORAL AT BEDTIME
Qty: 90 TABLET | Refills: 2 | Status: SHIPPED | OUTPATIENT
Start: 2019-03-13 | End: 2022-11-01

## 2019-11-08 ENCOUNTER — HEALTH MAINTENANCE LETTER (OUTPATIENT)
Age: 49
End: 2019-11-08

## 2019-11-13 ENCOUNTER — OFFICE VISIT (OUTPATIENT)
Dept: DERMATOLOGY | Facility: CLINIC | Age: 49
End: 2019-11-13
Payer: COMMERCIAL

## 2019-11-13 DIAGNOSIS — Z87.898 HX OF ATYPICAL NEVUS: ICD-10-CM

## 2019-11-13 DIAGNOSIS — D22.9 MULTIPLE BENIGN NEVI: ICD-10-CM

## 2019-11-13 DIAGNOSIS — D48.5 NEOPLASM OF UNCERTAIN BEHAVIOR OF SKIN: Primary | ICD-10-CM

## 2019-11-13 DIAGNOSIS — D18.01 CHERRY ANGIOMA: ICD-10-CM

## 2019-11-13 DIAGNOSIS — Z85.828 HISTORY OF NONMELANOMA SKIN CANCER: ICD-10-CM

## 2019-11-13 DIAGNOSIS — L81.4 SOLAR LENTIGO: ICD-10-CM

## 2019-11-13 RX ORDER — MUPIROCIN 20 MG/G
OINTMENT TOPICAL
Refills: 0 | COMMUNITY
Start: 2019-04-06 | End: 2021-01-12

## 2019-11-13 ASSESSMENT — PAIN SCALES - GENERAL
PAINLEVEL: NO PAIN (0)
PAINLEVEL: NO PAIN (0)

## 2019-11-13 NOTE — PATIENT INSTRUCTIONS

## 2019-11-13 NOTE — NURSING NOTE
Dermatology Rooming Note    Shannan Harris's goals for this visit include:   Chief Complaint   Patient presents with     Skin Check     Shannan is here today for a skin check- Shannan notes no areas of concern.      NA Ortiz

## 2019-11-13 NOTE — LETTER
11/13/2019       RE: Shannan Harris  [address]  [facility] [address]     Dear Colleague,    Thank you for referring your patient, Shannan Harris, to the [facility] DERMATOLOGY at [facility]. Please see a copy of my visit note below.    [facility] Dermatology Note      Dermatology Problem List:  1.BCC's:  -Left upper chest status post ED&C.    -Left abdomen.    -Right flank.    2. Tinea pedis  3. Moderate to severely dysplastic nevus (central upper back)  - s/p excision 8/11/2016    Encounter Date: Nov 13, 2019    CC:  Chief Complaint   Patient presents with     Skin Check     Shannan is here today for a skin check- Shannan notes no areas of concern.      History of Present Illness:  Ms. Shannan Harris is a 49 year old female who presents today for a total body skin exam. The patient was last seen 3/1/19 when had a biopsy of a lesion on the left lower thigh c/w hemangioma. Today, she reports a few concerning lesions including two pink spots on the left shin that have been present for about 4-6 months. Lesions are asymptomatic, no pain, bleeding, or tenderness.    The patient otherwise denies any new or concerning lesions. No bleeding, painful, pruritic, or changing lesions. No history of indoor tanning. They use sunscreen and protective clothing irregularly when outdoors for sun protection. No occupational exposure to ultraviolet light or other forms of radiation. 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 Health otherwise stable. No other skin concerns.       Past Medical History:   Patient Active Problem List   Diagnosis     Melanocytic nevus     SK (seborrheic keratosis)     History of basal cell carcinoma     Hot flashes     Neoplasm of uncertain behavior of skin     Tinea pedis of both feet     Nondisplaced fracture of fifth left metatarsal bone with routine healing     Left foot pain     History of basal cell cancer     Tinea pedis, unspecified laterality      Nasal obstruction     Deviated nasal septum     Nasal valve collapse     Past Medical History:   Diagnosis Date     Gastroesophageal reflux disease      Hot flashes      Past Surgical History:   Procedure Laterality Date     BIOPSY OF SKIN LESION       HYSTERECTOMY  2009    for uterine myoma and painful periods; has ovaries       Social History:  Social History     Socioeconomic History     Marital status: Significant other     Spouse name: None     Number of children: 0     Years of education: None     Highest education level: None   Occupational History     Occupation: research     Employer: [employer]   Social Needs     Financial resource strain: None     Food insecurity:     Worry: None     Inability: None     Transportation needs:     Medical: None     Non-medical: None   Tobacco Use     Smoking status: Never Smoker     Smokeless tobacco: Never Used   Substance and Sexual Activity     Alcohol use: No     Alcohol/week: 0.0 oz     Drug use: No     Sexual activity: Yes     Partners: Male     Birth control/protection: Surgical     Comment: hyst   Lifestyle     Physical activity:     Days per week: None     Minutes per session: None     Stress: None   Relationships     Social connections:     Talks on phone: None     Gets together: None     Attends Bahai service: None     Active member of club or organization: None     Attends meetings of clubs or organizations: None     Relationship status: None     Intimate partner violence:     Fear of current or ex partner: None     Emotionally abused: None     Physically abused: None     Forced sexual activity: None   Other Topics Concern     Parent/sibling w/ CABG, MI or angioplasty before 65F 55M? 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    One son, born by surrogacy in [place] in 2014.    Getting  in June 2017.       Family History:  Family History   Problem Relation Age of Onset     Cancer Mother         BCC     Cancer  Brother         BCC     Brain Cancer Paternal Uncle      Pancreatic Cancer Paternal Uncle      Cancer - colorectal No family hx of      Breast Cancer No family hx of      Skin Cancer No family hx of      Melanoma No family hx of        Medications:  Current Outpatient Medications   Medication Sig Dispense Refill     estradiol (VIVELLE-DOT) 0.05 MG/24HR BIW patch Place 1 patch onto the skin twice a week 24 patch 3     famotidine (PEPCID) 40 MG tablet Take 1 tablet (40 mg) by mouth At Bedtime 90 tablet 2     mupirocin (BACTROBAN) 2 % external ointment ALYX EXT TO THE SKIN TID  0       Allergies   Allergen Reactions     Nkda [No Known Drug Allergies]        Review of Systems:  -Skin Establ Pt: The patient denies any new rash, pruritus, or lesions that are symptomatic, changing or bleeding, except as per HPI.  -Constitutional: The patient is feeling generally well.    Physical exam:  Vitals: There were no vitals taken for this visit.  GEN: This is a well developed, well-nourished female in no acute distress, in a pleasant mood.    SKIN: Total skin excluding the undergarment areas was performed. The exam included the head/face, neck, both arms, chest, back, abdomen, both legs, digits and/or nails.   - Salcedo Type II   - On the left shin medial, there is a 4 mm pink shiny papule with non-specific dilated blood vessels appreciated under dermoscopy.  - On the left shin lateral, there is a similar appearing 4 mm pink shiny papule with non-specific dilated blood vessels and few light brown pigment clods peripherally.   - Brown macules on sun exposed areas.   - Dome shaped bright red papules on the trunk.   - Multiple regular brown pigmented macules and papules are identified on the trunk and extremities.   - Well healed surgical scars without erythema, nodularity or telangiectasias.  - No other lesions of concern on areas examined.               Impression/Plan:    1. History of basal cell carcinoma and dysplastic nevi.      No clinical evidence of recurrence.    Continue periodic skin exams and report of new or changing lesions.     Recommend sunscreens SPF #30 or greater, protective clothing and avoidance of tanning beds.    2. Neoplasm of uncertain behavior on the left left shin medial.  DDx includes BCC, SCC, AK, SK, BLK.     After discussion of benefits and risks including but not limited to bleeding, infection, scar, incomplete removal, recurrence, and non-diagnostic biopsy, written consent and photographs were obtained. The area was cleaned with isopropyl alcohol. 0.5 mL of 1% lidocaine with epinephrine was injected to obtain adequate anesthesia of the lesion on the left shin medial. A shave biopsy was performed. Hemostasis was achieved with aluminium chloride. Vaseline and a sterile dressing were applied. The patient tolerated the procedure and no complications were noted. The patient was provided with verbal and written post care instructions.     3. Neoplasm of uncertain behavior on the left left shin lateral.  DDx includes BCC, SCC, AK, SK, BLK.     After discussion of benefits and risks including but not limited to bleeding, infection, scar, incomplete removal, recurrence, and non-diagnostic biopsy, written consent and photographs were obtained. The area was cleaned with isopropyl alcohol. 0.5 mL of 1% lidocaine with epinephrine was injected to obtain adequate anesthesia of the lesion on the left shin latera. A shave biopsy was performed. Hemostasis was achieved with aluminium chloride. Vaseline and a sterile dressing were applied. The patient tolerated the procedure and no complications were noted. The patient was provided with verbal and written post care instructions.     4. Clinically benign nevi; solar lentigos    ABCDs of melanoma were discussed and self skin checks were advised.     5. Cherry angiomas    Benign nature reassured. No further intervention required.     Follow-up in 1 year, earlier for new or changing  lesions and pending biopsy results.     Staff Involved:  Staff Only  Nnamdi Hyde MD    Department of Dermatology  [facility]: Phone: [phone], Fax:[phone]  [facility]: Phone: [phone], Fax: [phone]

## 2019-11-13 NOTE — PROGRESS NOTES
[facility] Dermatology Note      Dermatology Problem List:  1.BCC's:  -Left upper chest status post ED&C.    -Left abdomen.    -Right flank.    2. Tinea pedis  3. Moderate to severely dysplastic nevus (central upper back)  - s/p excision 8/11/2016    Encounter Date: Nov 13, 2019    CC:  Chief Complaint   Patient presents with     Skin Check     Shannan is here today for a skin check- Shannan notes no areas of concern.      History of Present Illness:  Ms. Shannan Harris is a 49 year old female who presents today for a total body skin exam. The patient was last seen 3/1/19 when had a biopsy of a lesion on the left lower thigh c/w hemangioma. Today, she reports a few concerning lesions including two pink spots on the left shin that have been present for about 4-6 months. Lesions are asymptomatic, no pain, bleeding, or tenderness.    The patient otherwise denies any new or concerning lesions. No bleeding, painful, pruritic, or changing lesions. No history of indoor tanning. They use sunscreen and protective clothing irregularly when outdoors for sun protection. No occupational exposure to ultraviolet light or other forms of radiation. She works at [employer] in cancer [employer]. Health otherwise stable. No other skin concerns.       Past Medical History:   Patient Active Problem List   Diagnosis     Melanocytic nevus     SK (seborrheic keratosis)     History of basal cell carcinoma     Hot flashes     Neoplasm of uncertain behavior of skin     Tinea pedis of both feet     Nondisplaced fracture of fifth left metatarsal bone with routine healing     Left foot pain     History of basal cell cancer     Tinea pedis, unspecified laterality     Nasal obstruction     Deviated nasal septum     Nasal valve collapse     Past Medical History:   Diagnosis Date     Gastroesophageal reflux disease      Hot flashes      Past Surgical History:   Procedure Laterality Date     BIOPSY OF SKIN LESION       HYSTERECTOMY  2009    for  uterine myoma and painful periods; has ovaries       Social History:  Social History     Socioeconomic History     Marital status: Significant other     Spouse name: None     Number of children: 0     Years of education: None     Highest education level: None   Occupational History     Occupation: research     Employer: [employer]   Social Needs     Financial resource strain: None     Food insecurity:     Worry: None     Inability: None     Transportation needs:     Medical: None     Non-medical: None   Tobacco Use     Smoking status: Never Smoker     Smokeless tobacco: Never Used   Substance and Sexual Activity     Alcohol use: No     Alcohol/week: 0.0 oz     Drug use: No     Sexual activity: Yes     Partners: Male     Birth control/protection: Surgical     Comment: hyst   Lifestyle     Physical activity:     Days per week: None     Minutes per session: None     Stress: None   Relationships     Social connections:     Talks on phone: None     Gets together: None     Attends Orthodox service: None     Active member of club or organization: None     Attends meetings of clubs or organizations: None     Relationship status: None     Intimate partner violence:     Fear of current or ex partner: None     Emotionally abused: None     Physically abused: None     Forced sexual activity: None   Other Topics Concern     Parent/sibling w/ CABG, MI or angioplasty before 65F 55M? 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    One son, born by surrogacy in [place] in 2014.    Getting  in June 2017.       Family History:  Family History   Problem Relation Age of Onset     Cancer Mother         BCC     Cancer Brother         BCC     Brain Cancer Paternal Uncle      Pancreatic Cancer Paternal Uncle      Cancer - colorectal No family hx of      Breast Cancer No family hx of      Skin Cancer No family hx of      Melanoma No family hx of        Medications:  Current Outpatient  Medications   Medication Sig Dispense Refill     estradiol (VIVELLE-DOT) 0.05 MG/24HR BIW patch Place 1 patch onto the skin twice a week 24 patch 3     famotidine (PEPCID) 40 MG tablet Take 1 tablet (40 mg) by mouth At Bedtime 90 tablet 2     mupirocin (BACTROBAN) 2 % external ointment ALYX EXT TO THE SKIN TID  0       Allergies   Allergen Reactions     Nkda [No Known Drug Allergies]        Review of Systems:  -Skin Establ Pt: The patient denies any new rash, pruritus, or lesions that are symptomatic, changing or bleeding, except as per HPI.  -Constitutional: The patient is feeling generally well.    Physical exam:  Vitals: There were no vitals taken for this visit.  GEN: This is a well developed, well-nourished female in no acute distress, in a pleasant mood.    SKIN: Total skin excluding the undergarment areas was performed. The exam included the head/face, neck, both arms, chest, back, abdomen, both legs, digits and/or nails.   - Salcedo Type II   - On the left shin medial, there is a 4 mm pink shiny papule with non-specific dilated blood vessels appreciated under dermoscopy.  - On the left shin lateral, there is a similar appearing 4 mm pink shiny papule with non-specific dilated blood vessels and few light brown pigment clods peripherally.   - Brown macules on sun exposed areas.   - Dome shaped bright red papules on the trunk.   - Multiple regular brown pigmented macules and papules are identified on the trunk and extremities.   - Well healed surgical scars without erythema, nodularity or telangiectasias.  - No other lesions of concern on areas examined.               Impression/Plan:    1. History of basal cell carcinoma and dysplastic nevi.     No clinical evidence of recurrence.    Continue periodic skin exams and report of new or changing lesions.     Recommend sunscreens SPF #30 or greater, protective clothing and avoidance of tanning beds.    2. Neoplasm of uncertain behavior on the left left shin  medial.  DDx includes BCC, SCC, AK, SK, BLK.     After discussion of benefits and risks including but not limited to bleeding, infection, scar, incomplete removal, recurrence, and non-diagnostic biopsy, written consent and photographs were obtained. The area was cleaned with isopropyl alcohol. 0.5 mL of 1% lidocaine with epinephrine was injected to obtain adequate anesthesia of the lesion on the left shin medial. A shave biopsy was performed. Hemostasis was achieved with aluminium chloride. Vaseline and a sterile dressing were applied. The patient tolerated the procedure and no complications were noted. The patient was provided with verbal and written post care instructions.     3. Neoplasm of uncertain behavior on the left left shin lateral.  DDx includes BCC, SCC, AK, SK, BLK.     After discussion of benefits and risks including but not limited to bleeding, infection, scar, incomplete removal, recurrence, and non-diagnostic biopsy, written consent and photographs were obtained. The area was cleaned with isopropyl alcohol. 0.5 mL of 1% lidocaine with epinephrine was injected to obtain adequate anesthesia of the lesion on the left shin latera. A shave biopsy was performed. Hemostasis was achieved with aluminium chloride. Vaseline and a sterile dressing were applied. The patient tolerated the procedure and no complications were noted. The patient was provided with verbal and written post care instructions.     4. Clinically benign nevi; solar lentigos    ABCDs of melanoma were discussed and self skin checks were advised.     5. Cherry angiomas    Benign nature reassured. No further intervention required.     Follow-up in 1 year, earlier for new or changing lesions and pending biopsy results.     Staff Involved:  Staff Only  Nnamdi Hyde MD    Department of Dermatology  [facility]: Phone: [phone], Fax:[phone]  [facility]  [facility]: Phone: [phone], Fax: [phone]

## 2019-11-13 NOTE — NURSING NOTE
Lidocaine-epinephrine 1-1:802320 % injection   3mL once for one use, starting 11/13/2019 ending 11/13/2019,  2mL disp, R-0, injection  Injected by Dr. Hyde

## 2019-11-18 LAB — COPATH REPORT: NORMAL

## 2019-11-18 NOTE — RESULT ENCOUNTER NOTE
BCC - superficial types on the left shin medial x 1 and lateral x 1    Could you call patient to discuss results?     3 options for treatment:   (1) Electrodissecation and curretage - ED&C, e.g. scrape and burn procedure. This would have about 95% success rate at treating the lesions and leave a pink scar on the shin that would be a little larger than the lesions themselves.   (2) Topical cream treatment - imiquimod cream apply daily on weekdays (Monday-Friday) for 6 weeks. This has about a 85-90% success rate at treating superficial BCCs, so slightly less than the scrape and burn procedure, but would be less likely to leave a scar.     I would recommend option #2, but either are OK.     Please let me know which she would prefer.   If ED&C - can schedule in my clinic in 15-minute slot.   If imiquimod - please send me a message so I can send this to her pharmacy and I would check in with her in about 3-4 months to see how the treatment went.      Thanks!    Nnamdi Hyde MD    Department of Dermatology  [facility]: Phone: [phone], Fax:[phone]  [facility]: Phone: [phone], Fax: [phone]

## 2019-11-21 ENCOUNTER — OFFICE VISIT (OUTPATIENT)
Dept: DERMATOLOGY | Facility: CLINIC | Age: 49
End: 2019-11-21
Payer: COMMERCIAL

## 2019-11-21 DIAGNOSIS — C44.719: Primary | ICD-10-CM

## 2019-11-21 ASSESSMENT — PAIN SCALES - GENERAL: PAINLEVEL: NO PAIN (0)

## 2019-11-21 NOTE — LETTER
11/21/2019       RE: Shannan Harris  [address]  [facility] [address]     Dear Colleague,    Thank you for referring your patient, Shannan Harris, to the [facility] DERMATOLOGY at [facility]. Please see a copy of my visit note below.    Dermatology Procedure Note: Electrodesiccation and Curettage    Dermatology Problem List:  1.BCC's:  - superficial BCC, Left shin medial, s/p ED&C 11/21/19  - superficial BCC, Left shin lateral, s/p ED&C 11/21/19  -Left upper chest status post ED&C.    -Left abdomen.    -Right flank.    2. Tinea pedis  3. Moderate to severely dysplastic nevus (central upper back)  - s/p excision 8/11/2016    PREOPERATIVE DIAGNOSIS:   1. BCC, superficial  2. BCC, superficial    POSTOPERATIVE DIAGNOSIS: same    LOCATION:   1. Left shin medial  2. Left shin lateral.     SIZE:   1. 0.6 cm  2. 0.6 cm     Treatment options including electrodessiccation and curettage (ED and C), excision and topicals were reviewed.  The expected cure rates, healing times and anticipated scars of each option were discussed and the patient elects to proceed with ED and C.     The risks and benefits of the procedure were described to the patient.  These include but are not limited to bleeding, infection, scar, incomplete removal, and recurrence. Written informed consent was obtained. Time-out was performed. The above site was cleansed with and injected with 3.0 mL1% lidocaine with epinephrine. Once anesthesia was obtained, the site was prepped with Alcohol. The lesion was curetted with  in 3 directions with a 1 mm margin and this was followed by electrodessication.  This process was repeated three times. The defect measured 0.8 cm for lesion on the left shin medial and 0.8 cm for lesion on the left shin lateral. Vaseline and a bandage were applied to the wound. The patient tolerated the procedure well and was given post care instructions.    Clinical Follow-up: 6 months for FBSE.      Staff Involved:  Nnamdi Hyde MD    Department of Dermatology  [facility]: Phone: [phone], Fax:[phone]  [facility]: Phone: [phone], Fax: [phone]

## 2019-11-21 NOTE — PATIENT INSTRUCTIONS
Aldara Cream--chemotherapy cream          Wound Care:  Electrodesiccation and Curettage     I will experience scar, altered skin color, bleeding, swelling, pain, crusting and redness. I may experience altered sensation. Risks are excessive bleeding, infection, muscle weakness, thick (hypertrophic or keloidal) scar, and recurrence,. A second procedure may be recommended to obtain the best cosmetic or functional result.    What is electrodesiccation and curettage ?    Scraping off tissue (curettage)    Destroy tissue using electric current or cautery (electrodessication)    How do I perform wound care?    Keep dressing in place for two days. You may shower with the dressing in place(do not get wet)    After 2 days, wash hands and remove dressing. Clean wound with soap and water to remove drainage and crust    Put on a thick layer of Vaseline on the wound using a cotton-swab     Cover the wound with a Band-AidTM to protect from dust and tight clothing    If wound is draining before two days, change your dressing as described above sooner    During wound care, do not allow the area to dry out or form a scab    What do I need?     Cotton-swabs     Vaseline or petroleum jelly     Band-AidsTM or dressing supplies as needed     When should I call the doctor?  [facility]: [phone]  [facility]: [phone]  For urgent needs outside of business hours call the [facility] at [phone] and ask for the dermatology resident on call

## 2019-11-21 NOTE — PROGRESS NOTES
Dermatology Procedure Note: Electrodesiccation and Curettage    Dermatology Problem List:  1.BCC's:  - superficial BCC, Left shin medial, s/p ED&C 11/21/19  - superficial BCC, Left shin lateral, s/p ED&C 11/21/19  -Left upper chest status post ED&C.    -Left abdomen.    -Right flank.    2. Tinea pedis  3. Moderate to severely dysplastic nevus (central upper back)  - s/p excision 8/11/2016    PREOPERATIVE DIAGNOSIS:   1. BCC, superficial  2. BCC, superficial    POSTOPERATIVE DIAGNOSIS: same    LOCATION:   1. Left shin medial  2. Left shin lateral.     SIZE:   1. 0.6 cm  2. 0.6 cm     Treatment options including electrodessiccation and curettage (ED and C), excision and topicals were reviewed.  The expected cure rates, healing times and anticipated scars of each option were discussed and the patient elects to proceed with ED and C.     The risks and benefits of the procedure were described to the patient.  These include but are not limited to bleeding, infection, scar, incomplete removal, and recurrence. Written informed consent was obtained. Time-out was performed. The above site was cleansed with and injected with 3.0 mL1% lidocaine with epinephrine. Once anesthesia was obtained, the site was prepped with Alcohol. The lesion was curetted with  in 3 directions with a 1 mm margin and this was followed by electrodessication.  This process was repeated three times. The defect measured 0.8 cm for lesion on the left shin medial and 0.8 cm for lesion on the left shin lateral. Vaseline and a bandage were applied to the wound. The patient tolerated the procedure well and was given post care instructions.    Clinical Follow-up: 6 months for FBSE.     Staff Involved:  Nnamdi Hyde MD    Department of Dermatology  [facility]: Phone: [phone], Fax:[phone]  [facility]: Phone: [phone], Fax:  [phone]

## 2019-11-21 NOTE — NURSING NOTE
Dermatology Rooming Note    Shannan Harris's goals for this visit include:   Chief Complaint   Patient presents with     Derm Problem     Shannan is here for an ED&C.       India Lopez LPN

## 2019-12-17 ENCOUNTER — OFFICE VISIT (OUTPATIENT)
Dept: INTERNAL MEDICINE | Facility: CLINIC | Age: 49
End: 2019-12-17
Payer: COMMERCIAL

## 2019-12-17 ENCOUNTER — ANCILLARY PROCEDURE (OUTPATIENT)
Dept: GENERAL RADIOLOGY | Facility: CLINIC | Age: 49
End: 2019-12-17
Attending: INTERNAL MEDICINE
Payer: COMMERCIAL

## 2019-12-17 VITALS
OXYGEN SATURATION: 99 % | DIASTOLIC BLOOD PRESSURE: 72 MMHG | HEART RATE: 70 BPM | BODY MASS INDEX: 23.56 KG/M2 | SYSTOLIC BLOOD PRESSURE: 107 MMHG | WEIGHT: 128 LBS

## 2019-12-17 DIAGNOSIS — R07.9 CHEST PAIN, UNSPECIFIED TYPE: Primary | ICD-10-CM

## 2019-12-17 DIAGNOSIS — Z63.4 BEREAVEMENT: ICD-10-CM

## 2019-12-17 DIAGNOSIS — F41.9 ANXIETY: ICD-10-CM

## 2019-12-17 DIAGNOSIS — R07.9 CHEST PAIN, UNSPECIFIED TYPE: ICD-10-CM

## 2019-12-17 LAB
ANION GAP SERPL CALCULATED.3IONS-SCNC: 6 MMOL/L (ref 3–14)
BASOPHILS # BLD AUTO: 0 10E9/L (ref 0–0.2)
BASOPHILS NFR BLD AUTO: 0.4 %
BUN SERPL-MCNC: 13 MG/DL (ref 7–30)
CALCIUM SERPL-MCNC: 9 MG/DL (ref 8.5–10.1)
CHLORIDE SERPL-SCNC: 105 MMOL/L (ref 94–109)
CO2 SERPL-SCNC: 27 MMOL/L (ref 20–32)
CREAT SERPL-MCNC: 0.57 MG/DL (ref 0.52–1.04)
DIFFERENTIAL METHOD BLD: NORMAL
EOSINOPHIL # BLD AUTO: 0.1 10E9/L (ref 0–0.7)
EOSINOPHIL NFR BLD AUTO: 1.8 %
ERYTHROCYTE [DISTWIDTH] IN BLOOD BY AUTOMATED COUNT: 11.8 % (ref 10–15)
GFR SERPL CREATININE-BSD FRML MDRD: >90 ML/MIN/{1.73_M2}
GLUCOSE SERPL-MCNC: 86 MG/DL (ref 70–99)
HCT VFR BLD AUTO: 41.1 % (ref 35–47)
HGB BLD-MCNC: 13.8 G/DL (ref 11.7–15.7)
IMM GRANULOCYTES # BLD: 0 10E9/L (ref 0–0.4)
IMM GRANULOCYTES NFR BLD: 0.3 %
INTERPRETATION ECG - MUSE: NORMAL
LYMPHOCYTES # BLD AUTO: 2.7 10E9/L (ref 0.8–5.3)
LYMPHOCYTES NFR BLD AUTO: 38.9 %
MCH RBC QN AUTO: 31.9 PG (ref 26.5–33)
MCHC RBC AUTO-ENTMCNC: 33.6 G/DL (ref 31.5–36.5)
MCV RBC AUTO: 95 FL (ref 78–100)
MONOCYTES # BLD AUTO: 0.4 10E9/L (ref 0–1.3)
MONOCYTES NFR BLD AUTO: 6 %
NEUTROPHILS # BLD AUTO: 3.6 10E9/L (ref 1.6–8.3)
NEUTROPHILS NFR BLD AUTO: 52.6 %
NRBC # BLD AUTO: 0 10*3/UL
NRBC BLD AUTO-RTO: 0 /100
PLATELET # BLD AUTO: 283 10E9/L (ref 150–450)
POTASSIUM SERPL-SCNC: 4.2 MMOL/L (ref 3.4–5.3)
RBC # BLD AUTO: 4.33 10E12/L (ref 3.8–5.2)
SODIUM SERPL-SCNC: 138 MMOL/L (ref 133–144)
TSH SERPL DL<=0.005 MIU/L-ACNC: 1.14 MU/L (ref 0.4–4)
WBC # BLD AUTO: 6.8 10E9/L (ref 4–11)

## 2019-12-17 RX ORDER — PROPRANOLOL HYDROCHLORIDE 40 MG/1
40 TABLET ORAL DAILY PRN
Qty: 30 TABLET | Refills: 1 | Status: SHIPPED | OUTPATIENT
Start: 2019-12-17 | End: 2022-12-02

## 2019-12-17 SDOH — SOCIAL STABILITY - SOCIAL INSECURITY: DISSAPEARANCE AND DEATH OF FAMILY MEMBER: Z63.4

## 2019-12-17 ASSESSMENT — PAIN SCALES - GENERAL: PAINLEVEL: MILD PAIN (3)

## 2019-12-17 NOTE — NURSING NOTE
Chief Complaint   Patient presents with     Pain     pt here for pain in chest past two weeks     Recheck Medication     pt here to discuss possible couseling options       Melissa Ferraro CMA, EMT at 2:34 PM on 12/17/2019.

## 2019-12-17 NOTE — PATIENT INSTRUCTIONS
[facility] Medication Refill Request Information:  * Please contact your pharmacy regarding ANY request for medication refills.  ** [facility] Prescription Fax = [phone]  * Please allow 3 business days for routine medication refills.  * Please allow 5 business days for controlled substance medication refills.     [facility] Test Result notification information:  *You will be notified with in 7-10 days of your appointment day regarding the results of your test.  If you are on MyChart you will be notified as soon as the provider has reviewed the results and signed off on them.    [facility]: [phone]

## 2019-12-17 NOTE — PROGRESS NOTES
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  She is recently suffered the sudden unexpected loss of her younger brother who she was very close to.  She is devastated by this death and on return from the [employer] [facility] in [place] she began experiencing chest pain.  The pain is constant anterior in the chest not related to exertion activity or breathing.  There is no pleuritic component there is no cough there is no fever chills or sweats.  Its steady during the day does not bother her at night.  She does admit to being under a lot of stress and recognizes that this is a contributing factor to the discomfort.  She is requesting referral to psychology.  She is not interested in medication management for this at all.  She is also previously was active dissipating in yoga swimming and regular physical exercise has not been exercising over the last couple of weeks because of her bereavement.  Past Medical History:   Diagnosis Date     Gastroesophageal reflux disease      Hot flashes      Past Surgical History:   Procedure Laterality Date     BIOPSY OF SKIN LESION       HYSTERECTOMY  2009    for uterine myoma and painful periods; has ovaries     Family History   Problem Relation Age of Onset     Cancer Mother         BCC     Cancer Brother         BCC     Brain Cancer Paternal Uncle      Pancreatic Cancer Paternal Uncle      Cancer - colorectal No family hx of      Breast Cancer No family hx of      Skin Cancer No family hx of      Melanoma No family hx of      Social History     Socioeconomic History     Marital status: Significant other     Spouse name: None     Number of children: 0     Years of education: None     Highest education level: None   Occupational History     Occupation: research     Employer: [employer]   Social Needs     Financial resource strain: None     Food insecurity:     Worry: None     Inability: None     Transportation needs:      Medical: None     Non-medical: None   Tobacco Use     Smoking status: Never Smoker     Smokeless tobacco: Never Used   Substance and Sexual Activity     Alcohol use: No     Alcohol/week: 0.0 standard drinks     Drug use: No     Sexual activity: Yes     Partners: Male     Birth control/protection: Surgical     Comment: kelsy   Lifestyle     Physical activity:     Days per week: None     Minutes per session: None     Stress: None   Relationships     Social connections:     Talks on phone: None     Gets together: None     Attends Anglican service: None     Active member of club or organization: None     Attends meetings of clubs or organizations: None     Relationship status: None     Intimate partner violence:     Fear of current or ex partner: None     Emotionally abused: None     Physically abused: None     Forced sexual activity: None   Other Topics Concern     Parent/sibling w/ CABG, MI or angioplasty before 65F 55M? 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.    One son, born by surrogacy in [place] in 2014.    Getting  in June 2017.       Exam:  /72 (BP Location: Right arm, Patient Position: Sitting, Cuff Size: Adult Regular)   Pulse 70   Wt 58.1 kg (128 lb)   SpO2 99%   BMI 23.56 kg/m    128 lbs 0 oz  PHYSICAL EXAMINATION:   The patient is alert, oriented with a clear sensorium.   Skin shows no lesions or rashes and good turgor.   Head is normocephalic and atraumatic.   Eyes show PERRLA. Fundi are benign.   Neck shows no nodes, thyromegaly or bruits.   Chest wall shows tenderness over the anterior costosternal junctions left greater than right  Lungs are clear to auscultation.   Heart shows normal S1 and S2 without murmur or gallop.   Abdomen is soft, nontender without masses or organomegaly.   Extremities show no edema and no evidence of active synovitis.     EKG was reviewed with her showing sinus bradycardia otherwise normal     ASSESSMENT  1 chest wall pain  2  anxiety and bereavement contributing to above    Plan  We discussed the potential use of a beta-blocker in she agreed to try propranolol 40 mg as needed for the stress.  Will refer to health psychology check her chest x-ray CBC and BMP today as well.    Over 25 minutes spent with patient the majority in counseling and coordinating care.    This note was completed using Dragon voice recognition software.  Although reviewed after completion, some word and grammatical errors may occur.    Tarun Ambrose MD  General Internal Medicine  [facility]  [phone]

## 2019-12-23 ENCOUNTER — TELEPHONE (OUTPATIENT)
Dept: OBGYN | Facility: CLINIC | Age: 49
End: 2019-12-23

## 2019-12-23 DIAGNOSIS — N95.1 MENOPAUSAL HOT FLUSHES: ICD-10-CM

## 2019-12-23 RX ORDER — ESTRADIOL 0.05 MG/D
1 PATCH, EXTENDED RELEASE TRANSDERMAL
Qty: 24 PATCH | Refills: 0 | Status: SHIPPED | OUTPATIENT
Start: 2019-12-23 | End: 2020-04-01

## 2019-12-23 NOTE — TELEPHONE ENCOUNTER
Received refill request for Estradiol patch.  Last in clinic November 2018.      Reached out to Shannan via [employer] with message that refill request was received and a one month supply can be sent to pharmacy but office visit is due for further refills. Please call [phone] to schedule.

## 2020-01-16 ENCOUNTER — OFFICE VISIT (OUTPATIENT)
Dept: DERMATOLOGY | Facility: CLINIC | Age: 50
End: 2020-01-16
Payer: COMMERCIAL

## 2020-01-16 DIAGNOSIS — D22.9 MULTIPLE MELANOCYTIC NEVI: ICD-10-CM

## 2020-01-16 DIAGNOSIS — Z85.828 HISTORY OF NONMELANOMA SKIN CANCER: ICD-10-CM

## 2020-01-16 DIAGNOSIS — D48.5 NEOPLASM OF UNCERTAIN BEHAVIOR OF SKIN: Primary | ICD-10-CM

## 2020-01-16 DIAGNOSIS — L82.0 SEBORRHEIC KERATOSES, INFLAMED: ICD-10-CM

## 2020-01-16 RX ORDER — LIDOCAINE HYDROCHLORIDE AND EPINEPHRINE 10; 10 MG/ML; UG/ML
3 INJECTION, SOLUTION INFILTRATION; PERINEURAL ONCE
Status: ACTIVE | OUTPATIENT
Start: 2020-01-16

## 2020-01-16 ASSESSMENT — PAIN SCALES - GENERAL
PAINLEVEL: NO PAIN (0)
PAINLEVEL: MILD PAIN (2)

## 2020-01-16 NOTE — NURSING NOTE
Dermatology Rooming Note    Shannan Harris's goals for this visit include:   Chief Complaint   Patient presents with     Skin Check     Personal hx of BCC. Shannan has a few spots of concern today.     Jacque Harp, CMA

## 2020-01-16 NOTE — PROGRESS NOTES
DERMATOLOGY FOLLOWUP VISIT      CHIEF COMPLAINT:  Followup skin check.      SUBJECTIVE:  Shannan is a very pleasant 49-year-old female originally from [facility], with a history of multiple basal cell carcinomas including the left upper chest, left abdomen and right flank, as well as 2 superficial BCCs biopsied on the left shin on 11/13/2019.  She is recently status post ED&C with Dr. Nnamdi Hyde in our clinic, performed on 11/21/2019.  Today she reports that these surgical sites are healing well without problems.  She is here today for a full body skin check.  Her primary concerns are 2 or 3 new small red spots on her right inner lower calf.  She is concerned that these may represent new basal cell carcinomas.  She also has a spot that is growing slightly on her right forearm.  Otherwise, she denies any localized areas of itch, pain or bleeding, does not believe she has any new or changing moles and has no problems at prior surgical scar sites.      REVIEW OF SYSTEMS:  No recent fevers or chills.  No nonhealing oral sores.      PHYSICAL EXAMINATION:   GENERAL:  This is a well-appearing, well-nourished female with a normal mood and affect who is oriented x3.   SKIN:  A cutaneous exam of the head, neck, chest, abdomen, back, bilateral upper and lower extremities and buttocks was performed.  On the right forearm, there is an oval, 4 mm, light tan to pink, flat-topped papule which on dermoscopy demonstrates dot-like vessels at its periphery, consistent with a macular seborrheic keratosis.  On the left upper arm, there are 2 ovoid whitish scars without erythema or nodularity.  On the mid back, there is a linear, well-healed scar without nodularity or erythema.  On the right lower back, there are 3 pigmented, flat-topped papules between 3 and 6 mm in diameter, all of which demonstrate notably similar features on dermoscopy with light tan pigmentation admixed with erythema but mostly symmetric features and multiple  uniformly distributed punctate white dot-like structures.  On the left lateral knee, there is a rhomboid 4 mm brown papule with symmetric features on dermoscopy.  On the right medial lower calf, there is a somewhat linear 2 mm light pink, flat-topped papule which on dermoscopy demonstrates multiple uniform hairpin-like vessels.  Immediately superior to the right medial ankle, there is a 2 mm red, slightly firm papule with irregular vessels on dermoscopy.      ASSESSMENT AND PLAN:   1.  Neoplasm of uncertain behavior of the right ankle, possible BCC.  A biopsy was performed today.  Please see the procedure note below.   2.  Suspect inflamed benign keratosis of the right lower calf.  This was treated empirically with liquid nitrogen x5 seconds x2 cycles today.  If it does not improve, we may consider a biopsy at her followup visit to rule out an early superficial basal cell carcinoma.  My suspicion for that today is relatively low.  After informed verbal consent, this was treated with liquid nitrogen x5 seconds x2 cycles.  The patient tolerated this without complication.   3.  History of multiple basal cell carcinomas, most recently superficial BCCs on the left shin.  Clinically, there is no evidence of recurrence today.  Reassurance was provided.   4.  Multiple benign-appearing nevi, likely of dysplastic variety, on the right lower back.  None of these lesions demonstrate worrisome dermoscopy features.  We will continue to follow them clinically.   5.  She will follow up in our clinic in 6 months' time, sooner pending the results of her biopsy.      PROCEDURE NOTE:  After signed informed consent, the affected area was swabbed with an alcohol pad and injected with 1% lidocaine with epinephrine.  A 2 mm punch biopsy was taken and sent for histopathology.  Hemostasis was achieved with a single interrupted Prolene 4-0 suture and the defect was covered with petrolatum and a bandage.  The patient tolerated this without  complication.       Cuate Ambrocio MD  Dermatology Attending

## 2020-01-16 NOTE — PATIENT INSTRUCTIONS
Wound Care After a Biopsy    What is a skin biopsy?  A skin biopsy allows the doctor to examine a very small piece of tissue under the microscope to determine the diagnosis and the best treatment for the skin condition. A local anesthetic (numbing medicine)  is injected with a very small needle into the skin area to be tested. A small piece of skin is taken from the area. Sometimes a suture (stitch) is used.     What are the risks of a skin biopsy?  I will experience scar, bleeding, swelling, pain, crusting and redness. I may experience incomplete removal or recurrence. Risks of this procedure are excessive bleeding, bruising, infection, nerve damage, numbness, thick (hypertrophic or keloidal) scar and non-diagnostic biopsy.    How should I care for my wound for the first 24 hours?    Keep the wound dry and covered for 24 hours    If it bleeds, hold direct pressure on the area for 15 minutes. If bleeding does not stop then go to the emergency room    Avoid strenuous exercise the first 1-2 days or as your doctor instructs you    How should I care for the wound after 24 hours?    After 24 hours, remove the bandage    You may bathe or shower as normal    If you had a scalp biopsy, you can shampoo as usual and can use shower water to clean the biopsy site daily    Clean the wound twice a day with gentle soap and water    Do not scrub, be gentle    Apply white petroleum/Vaseline after cleaning the wound with a cotton swab or a clean finger, and keep the site covered with a Bandaid /bandage. Bandages are not necessary with a scalp biopsy    If you are unable to cover the site with a Bandaid /bandage, re-apply ointment 2-3 times a day to keep the site moist. Moisture will help with healing    Avoid strenuous activity for first 1-2 days    Avoid lakes, rivers, pools, and oceans until the stitches are removed or the site is healed    How do I clean my wound?    Wash hands thoroughly with soap or use hand  before all  wound care    Clean the wound with gentle soap and water    Apply white petroleum/Vaseline  to wound after it is clean    Replace the Bandaid /bandage to keep the wound covered for the first few days or as instructed by your doctor    If you had a scalp biopsy, warm shower water to the area on a daily basis should suffice    What should I use to clean my wound?     Cotton-tipped applicators (Qtips )    White petroleum jelly (Vaseline ). Use a clean new container and use Q-tips to apply.    Bandaids   as needed    Gentle soap     How should I care for my wound long term?    Do not get your wound dirty    Keep up with wound care for one week or until the area is healed.    A small scab will form and fall off by itself when the area is completely healed. The area will be red and will become pink in color as it heals. Sun protection is very important for how your scar will turn out. Sunscreen with an SPF 30 or greater is recommended once the area is healed.    If you have stitches, stitches need to be removed in 14 days. You may return to our clinic for this or you may have it done locally at your doctor s office.    You should have some soreness but it should be mild and slowly go away over several days. Talk to your doctor about using tylenol for pain,    When should I call my doctor?  If you have increased:     Pain or swelling    Pus or drainage (clear or slightly yellow drainage is ok)    Temperature over 100F    Spreading redness or warmth around wound    When will I hear about my results?  The biopsy results can take 2-3 weeks to come back. The clinic will call you with the results, send you a nGAPt message, or have you schedule a follow-up clinic or phone time to discuss the results. Contact our clinics if you do not hear from us in 3 weeks.     Who should I call with questions?    [facility]: [phone]     [facility]: [phone]    For  urgent needs outside of business hours call the [facility] at [phone] and ask for the dermatology resident on call    Cryotherapy    What is it?    Use of a very cold liquid, such as liquid nitrogen, to freeze and destroy abnormal skin cells that need to be removed    What should I expect?    Tenderness and redness    A small blister that might grow and fill with dark purple blood. There may be crusting.    More than one treatment may be needed if the lesions do not go away.    How do I care for the treated area?    Gently wash the area with your hands when bathing.    Use a thin layer of Vaseline to help with healing. You may use a Band-Aid.     The area should heal within 7-10 days and may leave behind a pink or lighter color.     Do not use an antibiotic or Neosporin ointment.     You may take acetaminophen (Tylenol) for pain.     Call your Doctor if you have:    Severe pain    Signs of infection (warmth, redness, cloudy yellow drainage, and or a bad smell)    Questions or concerns    Who should I call with questions?       [facility]: [phone]       [facility]: [phone]       For urgent needs outside of business hours call the [facility] at [phone]        and ask for the dermatology resident on call

## 2020-01-16 NOTE — LETTER
1/16/2020       RE: Shannan Harris  [address]  [place] [address]     Dear Colleague,    Thank you for referring your patient, Shannan Harris, to the [facility] at [facility]. Please see a copy of my visit note below.    DERMATOLOGY FOLLOWUP VISIT      CHIEF COMPLAINT:  Followup skin check.      SUBJECTIVE:  Shannan is a very pleasant 49-year-old female originally from [place] [place] [place], with a history of multiple basal cell carcinomas including the left upper chest, left abdomen and right flank, as well as 2 superficial BCCs biopsied on the left shin on 11/13/2019.  She is recently status post ED&C with Dr. Nnamdi Hyde in our clinic, performed on 11/21/2019.  Today she reports that these surgical sites are healing well without problems.  She is here today for a full body skin check.  Her primary concerns are 2 or 3 new small red spots on her right inner lower calf.  She is concerned that these may represent new basal cell carcinomas.  She also has a spot that is growing slightly on her right forearm.  Otherwise, she denies any localized areas of itch, pain or bleeding, does not believe she has any new or changing moles and has no problems at prior surgical scar sites.      REVIEW OF SYSTEMS:  No recent fevers or chills.  No nonhealing oral sores.      PHYSICAL EXAMINATION:   GENERAL:  This is a well-appearing, well-nourished female with a normal mood and affect who is oriented x3.   SKIN:  A cutaneous exam of the head, neck, chest, abdomen, back, bilateral upper and lower extremities and buttocks was performed.  On the right forearm, there is an oval, 4 mm, light tan to pink, flat-topped papule which on dermoscopy demonstrates dot-like vessels at its periphery, consistent with a macular seborrheic keratosis.  On the left upper arm, there are 2 ovoid whitish scars without erythema or nodularity.  On the mid back, there is a linear, well-healed scar without  nodularity or erythema.  On the right lower back, there are 3 pigmented, flat-topped papules between 3 and 6 mm in diameter, all of which demonstrate notably similar features on dermoscopy with light tan pigmentation admixed with erythema but mostly symmetric features and multiple uniformly distributed punctate white dot-like structures.  On the left lateral knee, there is a rhomboid 4 mm brown papule with symmetric features on dermoscopy.  On the right medial lower calf, there is a somewhat linear 2 mm light pink, flat-topped papule which on dermoscopy demonstrates multiple uniform hairpin-like vessels.  Immediately superior to the right medial ankle, there is a 2 mm red, slightly firm papule with irregular vessels on dermoscopy.      ASSESSMENT AND PLAN:   1.  Neoplasm of uncertain behavior of the right ankle, possible BCC.  A biopsy was performed today.  Please see the procedure note below.   2.  Suspect inflamed benign keratosis of the right lower calf.  This was treated empirically with liquid nitrogen x5 seconds x2 cycles today.  If it does not improve, we may consider a biopsy at her followup visit to rule out an early superficial basal cell carcinoma.  My suspicion for that today is relatively low.  After informed verbal consent, this was treated with liquid nitrogen x5 seconds x2 cycles.  The patient tolerated this without complication.   3.  History of multiple basal cell carcinomas, most recently superficial BCCs on the left shin.  Clinically, there is no evidence of recurrence today.  Reassurance was provided.   4.  Multiple benign-appearing nevi, likely of dysplastic variety, on the right lower back.  None of these lesions demonstrate worrisome dermoscopy features.  We will continue to follow them clinically.   5.  She will follow up in our clinic in 6 months' time, sooner pending the results of her biopsy.      PROCEDURE NOTE:  After signed informed consent, the affected area was swabbed with an  alcohol pad and injected with 1% lidocaine with epinephrine.  A 2 mm punch biopsy was taken and sent for histopathology.  Hemostasis was achieved with a single interrupted Prolene 4-0 suture and the defect was covered with petrolatum and a bandage.  The patient tolerated this without complication.       Cuate Ambrocio MD  Dermatology Attending

## 2020-01-16 NOTE — NURSING NOTE
Lidocaine-epinephrine 1-1:547533 % injection   1.5mL once for one use, starting 1/16/2020 ending 1/16/2020,  2mL disp, R-0, injection  Injected by Jacque Harp CMA

## 2020-01-20 LAB — COPATH REPORT: NORMAL

## 2020-01-22 DIAGNOSIS — D48.5 NEOPLASM OF UNCERTAIN BEHAVIOR OF SKIN: Primary | ICD-10-CM

## 2020-01-25 PROBLEM — L82.0 SEBORRHEIC KERATOSES, INFLAMED: Status: ACTIVE | Noted: 2020-01-25

## 2020-01-27 ENCOUNTER — OFFICE VISIT (OUTPATIENT)
Dept: DERMATOLOGY | Facility: CLINIC | Age: 50
End: 2020-01-27
Attending: DERMATOLOGY
Payer: COMMERCIAL

## 2020-01-27 VITALS — HEART RATE: 80 BPM | SYSTOLIC BLOOD PRESSURE: 97 MMHG | DIASTOLIC BLOOD PRESSURE: 67 MMHG

## 2020-01-27 DIAGNOSIS — D48.5 NEOPLASM OF UNCERTAIN BEHAVIOR OF SKIN: Primary | ICD-10-CM

## 2020-01-27 DIAGNOSIS — C44.712 BASAL CELL CARCINOMA (BCC) OF RIGHT LOWER EXTREMITY: ICD-10-CM

## 2020-01-27 ASSESSMENT — PAIN SCALES - GENERAL: PAINLEVEL: NO PAIN (0)

## 2020-01-27 NOTE — LETTER
1/27/2020       RE: Shannan Harris  [address]  [facility] [address]     Dear Colleague,    Thank you for referring your patient, Shannan Harris, to the [facility] DERMATOLOGIC SURGERY at [facility]. Please see a copy of my visit note below.    [facility] Mohs Dermatologic Surgery Procedure Note    Dermatology Surgery Clinic  [facility]  Clinics and Surgery Center  [address] [place] [address]    Date of Service:  Jan 27, 2020  Surgery: Mohs micrographic surgery    Surgeon: Alexander Garcia MD  Fellow: Antelmo Lau MD  Resident: Giovanni Davila MD    Case 1  Repair Type: complex  Repair Size: 3.4 cm  Suture Material: Monocryl 4-0; Prolene 4-0  Tumor Type: BCC - Basal cell carcinoma  Location: Right medial lower extremity above ankle  Derm-Path Accession #:   PreOp Size: 0.5 x 0.5 cm  PostOp Size: 1.0 x 1.5 (stage I); 1.5 x 1.5 cm (stage II)  Mohs Accession #: [id number]  Level of Defect: fat      Procedure:  We discussed the principles of treatment and most likely complications including scarring, bleeding, infection, swelling, pain, crusting, nerve damage, large wound,  incomplete excision, wound dehiscence,  nerve damage, recurrence, and a second procedure may be recommended to obtain the best cosmetic or functional result.    Informed consent was obtained and the patient underwent the procedure as follows:  The patient was placed supine on the operating table.  The cancer was identified, outlined with a marker, and verified by the patient.  The entire surgical field was prepped with Hibiclens.  The surgical site was anesthetized using Lidocaine 1% with epi 1:100,000.    The area of clinically apparent tumor was not debulked. The layer of tissue was then surgically excised using a #15 blade and was then transferred onto a specimen sheet maintaining the orientation of the specimen. Hemostasis was obtained using  electrocoagulation. The wound site was then covered with a dressing while the tissue samples were processed for examination.    The excised tissue was transported to the Mohs histology laboratory maintaining the tissue orientation.  The tissue specimen was relaxed so that the entire surgical margin was in a a single horizontal plane for sectioning and inked for precise mapping.  A precise reference map was drawn to reflect the sectioning of the specimen, colored inking of the margins, and orientation on the patient.  The tissue was processed using horizontal sectioning of the base and continuous peripheral margins.  The histopathologic sections were reviewed in conjunction with the reference map.    Total blocks: 2    Total slides:  4    Residual tumor was identified and indicated in red on the reference map, identifying the location where further tissue excision was necessary. Therefore, an additional stage of Mohs Micrographic surgery was deemed necessary.     Stage II   The patient was returned to the operating room, and the area prepped in the usual manner. The residual tumor was excised using the reference map as a guide. The specimen was transfered to a labeled specimen sheet maintaining the orientation of the specimen. Hemostasis was obtained and the wound site was covered with a dressing while the tissue was processed for examination.     The excised tissue was transported to the Mohs histology laboratory maintaining orientation. The specimen margins were inked for precise mapping and a reference map was prepared for the is additional stage to maintain precise orientation as described above. The tissue was processed using horizontal sectioning of the base and continuous peripheral margins. The histopathologic sections were reviewed in conjunction with the reference map.     Total blocks: 1    Total slides:  3    There were no cancer cells visualized on examination, therefore Mohs surgery was complete.      Reconstruction: Complex Closure    Due to one or more of the following factors, complex linear closure was required/indicated: Extensive undermining (equal to or greater than the maximum perpendicular width of the defect), exposure of underlying structure (bone, cartilage, tendon, named neurovascular), free margin involvement (helical rim, vermillion border, nostril rim), and/or placement of retention sutures.     The patient was taken to the operative suite and placed supine on the operating room table. The defect was identified. Appropriate markings were made with a marking pen to plan the repair. The area was infiltrated with Lidocaine 1% with epi 1:100,000 and prepped with Hibiclens and draped with sterile towels.     The wound was debeveled and undermined widely. Cones were excised within relaxed skin tension lines on both sides of the defect. Hemostasis was obtained using electrocoagulation. Subcutaneous tissues were then approximated using 4-0 Monocryl buried vertical mattress sutures. The wound edges were then approximated additional 4-0 Monocryl buried sutures were placed in a similar fashion where needed. Percutaneous simple running 5-0 fast absorbing gut sutures were carefully placed for maximum eversion and meticulous approximation.    Repair Size: 3.4 cm    The wound was cleansed with saline and ointment was applied along the wound surface.     A sterile pressure dressing was applied. Wound care instructions were given verbally and in writing. The patient left the operating suite in stable condition. Patient was informed that additional refinement of the resulting surgical scar may be used as a second stage of this reconstruction.     The attending surgeon was present for the entire procedure and always immediately available.      Staff Involved:    Scribe Disclosure  I, Niki Riggins, am serving as a scribe to document services personally performed by Dr. Alexander Garcia, based on data collection and the  provider's statements to me.     Attending Attestation  I attest that the Scribe recorded the interview and exam that I personally performed.  I have reviewed the note and edited it as necessary.    Alexander Garcia M.D.  Professor  Director of Dermatologic Surgery  Department of Dermatology  [facility] Mohs Dermatologic Surgery Procedure Note    Dermatology Surgery Clinic  [facility]  [address] [place] [address]    Date of Service:  Jan 27, 2020  Surgery: Mohs micrographic surgery    Surgeon: Alexander Garcia MD  Fellow: Antelmo Lau MD  Resident: Giovanni Davila MD    Case 2  Repair Type: complex  Repair Size: 3.7 cm  Suture Material: Monocryl 4-0; Prolene 4-0  Tumor Type: BCC - Basal cell carcinoma  Location: Right medial calf  Derm-Path Accession #: NA at time of this note  PreOp Size: 1.5 x 1.5 cm  PostOp Size: 1.5 x 1.5 cm  Mohs Accession #: [id number]  Level of Defect: fat    FROZEN SECTION PATHOLOGY:  Clinical history: 49 year old female presented for Mohs surgery. Clinically suspicious for malignancy, therefore Mohs frozen section was performed.    Procedure:  Shave biopsy:  After discussion of risks, benefits and alternatives, informed consent was obtained for biopsy. The area was cleaned with isopropyl alcohol or chlorhexidine and anesthetized. Shave biopsy was performed on the R medial calf. Hemostasis was achieved with electrocautery. Vaseline and a sterile dressing were applied. The patient tolerated the procedure and no complications were noted.   Gross: 0.8 cm lesion submitted for processing by frozen section pathology  Microscopic: Clustering of atypical basaloid cells with scant cytoplasm and peripheral palisading with extension into the dermis as nodular aggregates with multiple foci.  Diagnosis: Skin, shave biopsy: BCC    The patient then underwent Mohs surgery for treatment of the lesion.      Procedure:  We discussed the principles of treatment and most likely complications including scarring, bleeding, infection, swelling, pain, crusting, nerve damage, large wound,  incomplete excision, wound dehiscence,  nerve damage, recurrence, and a second procedure may be recommended to obtain the best cosmetic or functional result.    Informed consent was obtained and the patient underwent the procedure as follows:  The patient was placed supine on the operating table.  The cancer was identified, outlined with a marker, and verified by the patient.  The entire surgical field was prepped with Hibiclens.  The surgical site was anesthetized using Lidocaine 1% with epi 1:100,000.    The area of clinically apparent tumor was not debulked. The layer of tissue was then surgically excised using a #15 blade and was then transferred onto a specimen sheet maintaining the orientation of the specimen. Hemostasis was obtained using electrocoagulation. The wound site was then covered with a dressing while the tissue samples were processed for examination.    The excised tissue was transported to the Mohs histology laboratory maintaining the tissue orientation.  The tissue specimen was relaxed so that the entire surgical margin was in a a single horizontal plane for sectioning and inked for precise mapping.  A precise reference map was drawn to reflect the sectioning of the specimen, colored inking of the margins, and orientation on the patient.  The tissue was processed using horizontal sectioning of the base and continuous peripheral margins.  The histopathologic sections were reviewed in conjunction with the reference map.    Total blocks: 1    Total slides:  3    There were no cancer cells visualized on examination, therefore Mohs surgery was complete.    Reconstruction: Complex Closure    Due to one or more of the following factors, complex linear closure was required/indicated: Extensive undermining (equal to or greater  than the maximum perpendicular width of the defect), exposure of underlying structure (bone, cartilage, tendon, named neurovascular), free margin involvement (helical rim, vermillion border, nostril rim), and/or placement of retention sutures.     The patient was taken to the operative suite and placed supine on the operating room table. The defect was identified. Appropriate markings were made with a marking pen to plan the repair. The area was infiltrated with Lidocaine 1% with epi 1:100,000 and prepped with Hibiclens and draped with sterile towels.     The wound was debeveled and undermined widely. Cones were excised within relaxed skin tension lines on both sides of the defect. Hemostasis was obtained using electrocoagulation. Subcutaneous tissues were then approximated using 4-0 Monocryl buried vertical mattress sutures. The wound edges were then approximated additional 4-0 Monocryl buried sutures were placed in a similar fashion where needed. Percutaneous simple running 5-0 fast absorbing gut sutures were carefully placed for maximum eversion and meticulous approximation.    Repair Size: 3.7 cm    The wound was cleansed with saline and ointment was applied along the wound surface.     A sterile pressure dressing was applied. Wound care instructions were given verbally and in writing. The patient left the operating suite in stable condition. Patient was informed that additional refinement of the resulting surgical scar may be used as a second stage of this reconstruction.     The attending surgeon was present for key, major portions of the procedure and always immediately available.                    Picture placed in chart for future reference.   Staff Involved:    Scribe Disclosure  I, Niki Riggins, am serving as a scribe to document services personally performed by Dr. Alexander Garcia, based on data collection and the provider's statements to me.     Attending attestation:  I personally performed the entire  procedure.  I have reviewed the note and edited it as necessary, and agree with its contents.    Alexander Garcia M.D.  Professor  Director of Dermatologic Surgery  Department of Dermatology  [facility]    Dermatology Surgery Clinic  [place] [id number]

## 2020-01-27 NOTE — PROGRESS NOTES
[facility] Mohs Dermatologic Surgery Procedure Note    Dermatology Surgery Clinic  [facility]  Clinics and Surgery Center  [address] [place] [id number]    Date of Service:  Jan 27, 2020  Surgery: Mohs micrographic surgery    Surgeon: Alexander Garcia MD  Fellow: Antelmo Lau MD  Resident: Giovanni Davila MD    Case 1  Repair Type: complex  Repair Size: 3.4 cm  Suture Material: Monocryl 4-0; Prolene 4-0  Tumor Type: BCC - Basal cell carcinoma  Location: Right medial lower extremity above ankle  Derm-Path Accession #:   PreOp Size: 0.5 x 0.5 cm  PostOp Size: 1.0 x 1.5 (stage I); 1.5 x 1.5 cm (stage II)  Mohs Accession #: [id number]  Level of Defect: fat      Procedure:  We discussed the principles of treatment and most likely complications including scarring, bleeding, infection, swelling, pain, crusting, nerve damage, large wound,  incomplete excision, wound dehiscence,  nerve damage, recurrence, and a second procedure may be recommended to obtain the best cosmetic or functional result.    Informed consent was obtained and the patient underwent the procedure as follows:  The patient was placed supine on the operating table.  The cancer was identified, outlined with a marker, and verified by the patient.  The entire surgical field was prepped with Hibiclens.  The surgical site was anesthetized using Lidocaine 1% with epi 1:100,000.    The area of clinically apparent tumor was not debulked. The layer of tissue was then surgically excised using a #15 blade and was then transferred onto a specimen sheet maintaining the orientation of the specimen. Hemostasis was obtained using electrocoagulation. The wound site was then covered with a dressing while the tissue samples were processed for examination.    The excised tissue was transported to the Mohs histology laboratory maintaining the tissue orientation.  The tissue specimen was relaxed so that the entire surgical  margin was in a a single horizontal plane for sectioning and inked for precise mapping.  A precise reference map was drawn to reflect the sectioning of the specimen, colored inking of the margins, and orientation on the patient.  The tissue was processed using horizontal sectioning of the base and continuous peripheral margins.  The histopathologic sections were reviewed in conjunction with the reference map.    Total blocks: 2    Total slides:  4    Residual tumor was identified and indicated in red on the reference map, identifying the location where further tissue excision was necessary. Therefore, an additional stage of Mohs Micrographic surgery was deemed necessary.     Stage II   The patient was returned to the operating room, and the area prepped in the usual manner. The residual tumor was excised using the reference map as a guide. The specimen was transfered to a labeled specimen sheet maintaining the orientation of the specimen. Hemostasis was obtained and the wound site was covered with a dressing while the tissue was processed for examination.     The excised tissue was transported to the Mohs histology laboratory maintaining orientation. The specimen margins were inked for precise mapping and a reference map was prepared for the is additional stage to maintain precise orientation as described above. The tissue was processed using horizontal sectioning of the base and continuous peripheral margins. The histopathologic sections were reviewed in conjunction with the reference map.     Total blocks: 1    Total slides:  3    There were no cancer cells visualized on examination, therefore Mohs surgery was complete.     Reconstruction: Complex Closure    Due to one or more of the following factors, complex linear closure was required/indicated: Extensive undermining (equal to or greater than the maximum perpendicular width of the defect), exposure of underlying structure (bone, cartilage, tendon, named  neurovascular), free margin involvement (helical rim, vermillion border, nostril rim), and/or placement of retention sutures.     The patient was taken to the operative suite and placed supine on the operating room table. The defect was identified. Appropriate markings were made with a marking pen to plan the repair. The area was infiltrated with Lidocaine 1% with epi 1:100,000 and prepped with Hibiclens and draped with sterile towels.     The wound was debeveled and undermined widely. Cones were excised within relaxed skin tension lines on both sides of the defect. Hemostasis was obtained using electrocoagulation. Subcutaneous tissues were then approximated using 4-0 Monocryl buried vertical mattress sutures. The wound edges were then approximated additional 4-0 Monocryl buried sutures were placed in a similar fashion where needed. Percutaneous simple running 5-0 fast absorbing gut sutures were carefully placed for maximum eversion and meticulous approximation.    Repair Size: 3.4 cm    The wound was cleansed with saline and ointment was applied along the wound surface.     A sterile pressure dressing was applied. Wound care instructions were given verbally and in writing. The patient left the operating suite in stable condition. Patient was informed that additional refinement of the resulting surgical scar may be used as a second stage of this reconstruction.     The attending surgeon was present for the entire procedure and always immediately available.      Staff Involved:    Scribe Disclosure  I, Niki Riggins, am serving as a scribe to document services personally performed by Dr. Alexander Garcia, based on data collection and the provider's statements to me.     Attending Attestation  I attest that the Scribe recorded the interview and exam that I personally performed.  I have reviewed the note and edited it as necessary.    Alexander Garcia M.D.  Professor  Director of Dermatologic Surgery  Department of  Dermatology  [facility]

## 2020-01-27 NOTE — PROGRESS NOTES
[facility] Mohs Dermatologic Surgery Procedure Note    Dermatology Surgery Clinic  [facility]  Clinics and Surgery Center  [address] [place] [address]    Date of Service:  Jan 27, 2020  Surgery: Mohs micrographic surgery    Surgeon: Alexander Garcia MD  Fellow: Antelmo Lau MD  Resident: Giovanni Davila MD    Case 2  Repair Type: complex  Repair Size: 3.7 cm  Suture Material: Monocryl 4-0; Prolene 4-0  Tumor Type: BCC - Basal cell carcinoma  Location: Right medial calf  Derm-Path Accession #: NA at time of this note  PreOp Size: 1.5 x 1.5 cm  PostOp Size: 1.5 x 1.5 cm  Mohs Accession #: [id number]  Level of Defect: fat    FROZEN SECTION PATHOLOGY:  Clinical history: 49 year old female presented for Mohs surgery. Clinically suspicious for malignancy, therefore Mohs frozen section was performed.    Procedure:  Shave biopsy:  After discussion of risks, benefits and alternatives, informed consent was obtained for biopsy. The area was cleaned with isopropyl alcohol or chlorhexidine and anesthetized. Shave biopsy was performed on the R medial calf. Hemostasis was achieved with electrocautery. Vaseline and a sterile dressing were applied. The patient tolerated the procedure and no complications were noted.   Gross: 0.8 cm lesion submitted for processing by frozen section pathology  Microscopic: Clustering of atypical basaloid cells with scant cytoplasm and peripheral palisading with extension into the dermis as nodular aggregates with multiple foci.  Diagnosis: Skin, shave biopsy: BCC    The patient then underwent Mohs surgery for treatment of the lesion.     Procedure:  We discussed the principles of treatment and most likely complications including scarring, bleeding, infection, swelling, pain, crusting, nerve damage, large wound,  incomplete excision, wound dehiscence,  nerve damage, recurrence, and a second procedure may be recommended to obtain the best cosmetic or  functional result.    Informed consent was obtained and the patient underwent the procedure as follows:  The patient was placed supine on the operating table.  The cancer was identified, outlined with a marker, and verified by the patient.  The entire surgical field was prepped with Hibiclens.  The surgical site was anesthetized using Lidocaine 1% with epi 1:100,000.    The area of clinically apparent tumor was not debulked. The layer of tissue was then surgically excised using a #15 blade and was then transferred onto a specimen sheet maintaining the orientation of the specimen. Hemostasis was obtained using electrocoagulation. The wound site was then covered with a dressing while the tissue samples were processed for examination.    The excised tissue was transported to the Mohs histology laboratory maintaining the tissue orientation.  The tissue specimen was relaxed so that the entire surgical margin was in a a single horizontal plane for sectioning and inked for precise mapping.  A precise reference map was drawn to reflect the sectioning of the specimen, colored inking of the margins, and orientation on the patient.  The tissue was processed using horizontal sectioning of the base and continuous peripheral margins.  The histopathologic sections were reviewed in conjunction with the reference map.    Total blocks: 1    Total slides:  3    There were no cancer cells visualized on examination, therefore Mohs surgery was complete.    Reconstruction: Complex Closure    Due to one or more of the following factors, complex linear closure was required/indicated: Extensive undermining (equal to or greater than the maximum perpendicular width of the defect), exposure of underlying structure (bone, cartilage, tendon, named neurovascular), free margin involvement (helical rim, vermillion border, nostril rim), and/or placement of retention sutures.     The patient was taken to the operative suite and placed supine on the  operating room table. The defect was identified. Appropriate markings were made with a marking pen to plan the repair. The area was infiltrated with Lidocaine 1% with epi 1:100,000 and prepped with Hibiclens and draped with sterile towels.     The wound was debeveled and undermined widely. Cones were excised within relaxed skin tension lines on both sides of the defect. Hemostasis was obtained using electrocoagulation. Subcutaneous tissues were then approximated using 4-0 Monocryl buried vertical mattress sutures. The wound edges were then approximated additional 4-0 Monocryl buried sutures were placed in a similar fashion where needed. Percutaneous simple running 5-0 fast absorbing gut sutures were carefully placed for maximum eversion and meticulous approximation.    Repair Size: 3.7 cm    The wound was cleansed with saline and ointment was applied along the wound surface.     A sterile pressure dressing was applied. Wound care instructions were given verbally and in writing. The patient left the operating suite in stable condition. Patient was informed that additional refinement of the resulting surgical scar may be used as a second stage of this reconstruction.     The attending surgeon was present for key, major portions of the procedure and always immediately available.                    Picture placed in chart for future reference.   Staff Involved:    Scribe Disclosure  I, Niki Riggins, am serving as a scribe to document services personally performed by Dr. Alexander Garcia, based on data collection and the provider's statements to me.     Attending attestation:  I personally performed the entire procedure.  I have reviewed the note and edited it as necessary, and agree with its contents.    Alexander Garcia M.D.  Professor  Director of Dermatologic Surgery  Department of Dermatology  [facility]    Dermatology Surgery Clinic  [facility]  [address] [place] [address]  [place] [address]

## 2020-01-27 NOTE — PATIENT INSTRUCTIONS
Wound Care Instructions  I will experience scar, altered skin color, bleeding, swelling, pain, crusting and redness. I may experience altered sensation. Risks are excessive bleeding, infection, muscle weakness, thick (hypertrophic or keloidal) scar, and recurrence,. A second procedure may be recommended to obtain the best cosmetic or functional result.  Possible complications of any surgical procedure are bleeding, infection, scarring, alteration in skin color and sensation, muscle weakness in the area, wound dehiscence or seperation, or recurrence of the lesion or disease. On occasion, after healing, a secondary procedure or revision may be recommended in order to obtain the best cosmetic or functional result.   After your surgery, a pressure bandage will be placed over the area that has sutures. This will help prevent bleeding.   For the First 48 hours After Surgery:  1. Leave the pressure bandage on and keep it dry. If it should come loose, you may retape it, but do not take it off.  2. Relax and take it easy. Do not do any vigorous exercise, heavy lifting, or bending forward. This could cause the wound to bleed.  3. Post-operative pain is usually mild. You may take plain or extra strength Tylenol every 4 hours as needed (do not take more than 4,000mg in one day). Do not take any medicine that contains aspirin, ibuprofen or motrin unless you have been recommended these by a doctor.  Avoid alcohol and vitamin E as these may increase your tendency to bleed.  4. You may put an ice pack around the bandaged area for 20 minutes every 2-3 hours. This may help reduce swelling, bruising, and pain. Make sure the ice pack is waterproof so that the pressure bandage does not get wet.   5. You may see a small amount of drainage or blood on your pressure bandage. This is normal. However, if drainage or bleeding continues or saturates the bandage, you will need to apply firm pressure over the bandage with a washcloth for 15  minutes. If bleeding continues after applying pressure for 15 minutes then go to the nearest emergency room.  48 Hours After Surgery  Carefully remove the bandage and start daily wound care and dressing changes. You may also now shower and get the wound wet. Wash wound with a mild soap and water.  Use caution when washing the wound. Be gentle and do not let the forceful shower stream hit the wound directly.  PAT dry.  Daily Wound Care:  1. Wash wound with a mild soap and water.  Use caution when washing the wound, be gentle and do not let the forceful shower stream hit the wound directly.  2. PAT DRY.  3. Apply Vaseline (from a new container or tube) over the suture line with a Q-tip. It is very important to keep the wound continuously moist, as wounds heal best in a moist environment.  4.  Keep the site covered until sutures are removed, you can cover it with a Telfa (non-stick) dressing and tape or a band-aid.    5. If you are unable to keep wound covered, you must apply Vaseline every 2 - 3 hours (while awake) to ensure it is being kept moist for optimal healing. A dressing overnight is recommended to keep the area moist.   Call Us If:  1. You have pain that is not controlled with Tylenol.  2. You have signs or symptoms of an infection, such as: fever over 100 degrees F, redness, warmth, or foul-smelling or yellow/creamy drainage from the wound.  Who should I call with questions?    [facility]: [phone]     [facility]: [phone]    For urgent needs outside of business hours call the [facility] at [phone] and ask for the dermatology resident on call

## 2020-01-28 ENCOUNTER — TELEPHONE (OUTPATIENT)
Dept: DERMATOLOGY | Facility: CLINIC | Age: 50
End: 2020-01-28

## 2020-01-28 NOTE — TELEPHONE ENCOUNTER
I called the patient and I left a message asking that she call back, we would like to know how she is doing after her procedure yesterday.   Thais Kenney, [facility]

## 2020-01-29 LAB — COPATH REPORT: NORMAL

## 2020-02-10 ENCOUNTER — DOCUMENTATION ONLY (OUTPATIENT)
Dept: CARE COORDINATION | Facility: CLINIC | Age: 50
End: 2020-02-10

## 2020-02-11 ENCOUNTER — ALLIED HEALTH/NURSE VISIT (OUTPATIENT)
Dept: DERMATOLOGY | Facility: CLINIC | Age: 50
End: 2020-02-11
Payer: COMMERCIAL

## 2020-02-11 DIAGNOSIS — C44.712 BASAL CELL CARCINOMA (BCC) OF RIGHT LOWER EXTREMITY: Primary | ICD-10-CM

## 2020-02-11 ASSESSMENT — PAIN SCALES - GENERAL: PAINLEVEL: NO PAIN (0)

## 2020-02-11 NOTE — NURSING NOTE
Chief Complaint   Patient presents with     Derm Problem     suture removal L leg, no concerns     Addie Hills, EMT

## 2020-02-12 ENCOUNTER — ANCILLARY PROCEDURE (OUTPATIENT)
Dept: MAMMOGRAPHY | Facility: CLINIC | Age: 50
End: 2020-02-12
Attending: OBSTETRICS & GYNECOLOGY
Payer: COMMERCIAL

## 2020-02-12 DIAGNOSIS — Z12.31 VISIT FOR SCREENING MAMMOGRAM: ICD-10-CM

## 2020-02-20 NOTE — PROGRESS NOTES
Shannan Kimberly comes into clinic today at the request of Dr. patterson Ordering Provider for suture removal    This service provided today was under the supervising provider of the day Dr. patterson, who was available if needed.    Addie Hills, EMT

## 2020-02-23 ENCOUNTER — HEALTH MAINTENANCE LETTER (OUTPATIENT)
Age: 50
End: 2020-02-23

## 2020-04-01 DIAGNOSIS — N95.1 MENOPAUSAL HOT FLUSHES: ICD-10-CM

## 2020-04-01 RX ORDER — ESTRADIOL 0.05 MG/D
1 PATCH, EXTENDED RELEASE TRANSDERMAL
Qty: 24 PATCH | Refills: 1 | Status: SHIPPED | OUTPATIENT
Start: 2020-04-02 | End: 2020-08-25

## 2020-04-01 NOTE — TELEPHONE ENCOUNTER
Received refill request for estradiol patches. Patient is due for an appointment but due to COVID all non essential appointments are delayed. Patient did have normal mammogram this year. Sent Refill to get patient through until COVID crisis over.

## 2020-08-25 DIAGNOSIS — N95.1 MENOPAUSAL HOT FLUSHES: ICD-10-CM

## 2020-08-25 RX ORDER — ESTRADIOL 0.05 MG/D
1 PATCH, EXTENDED RELEASE TRANSDERMAL
Qty: 24 PATCH | Refills: 0 | Status: SHIPPED | OUTPATIENT
Start: 2020-08-27 | End: 2021-01-19

## 2020-08-25 NOTE — TELEPHONE ENCOUNTER
Received refill request for estradiol patch.  Patient is due for annual. She had this scheduled in March but was cancelled due to COVID.     [employer] message to patient to let her know she can now reschedule annual. Short-term supply sent.

## 2020-12-06 ENCOUNTER — HEALTH MAINTENANCE LETTER (OUTPATIENT)
Age: 50
End: 2020-12-06

## 2021-01-05 ENCOUNTER — VIRTUAL VISIT (OUTPATIENT)
Dept: INTERNAL MEDICINE | Facility: CLINIC | Age: 51
End: 2021-01-05
Payer: COMMERCIAL

## 2021-01-05 DIAGNOSIS — R07.81 RIB PAIN ON RIGHT SIDE: Primary | ICD-10-CM

## 2021-01-05 PROCEDURE — 99213 OFFICE O/P EST LOW 20 MIN: CPT | Mod: 95 | Performed by: STUDENT IN AN ORGANIZED HEALTH CARE EDUCATION/TRAINING PROGRAM

## 2021-01-05 RX ORDER — CYCLOBENZAPRINE HCL 10 MG
10 TABLET ORAL
Qty: 10 TABLET | Refills: 0 | Status: SHIPPED | OUTPATIENT
Start: 2021-01-05 | End: 2022-12-02

## 2021-01-05 NOTE — PROGRESS NOTES
THIS IS A VIDEO PHONE VISIT.        PRIMARY CARE CENTER         HPI:           Shannan Harris is a healthy 50 year old woman with no significant past medical history who is being evaluated for acute R flank pain.    Pt was in her usual state of reportedly excellent health until she was involved in an accident at the park while sledding with her children and tumbled down hill. No loss of consciousness during episode, but doesn't remember specifically if someone hit her during the tumble. Approximately 2 hours after the accident, she started experiencing pain to the R flank muscles  Around ribs ~8-12 posterior to mid axillary line, and has also experienced sharp pain that shoots anteriorly to her R flank anterior to the mid axillary line around dermatomes ~T8-T12. Coughing and deep breathing are made difficult because of the pain. Bending forward while getting up/sitting down is also made more difficult, as is R arm raising. She denies any rashes or bruises or fluid collection locally. She tried some cyclobenzaprine left over from her  as well as some ibuprofen, both of which provided relief. She also tried massage by licensed therapy, with application of an NSAID cream, and says that helped as well.    She has a history of low back pain (x6 years), for which she has taken PO ibuprofen over the years.    She wears an estradiol patch for management of hot flashes.    She has no other concerns at this time.    PMHx:  Past Medical History:   Diagnosis Date     Gastroesophageal reflux disease      Hot flashes        PSHx:  Past Surgical History:   Procedure Laterality Date     BIOPSY OF SKIN LESION       HYSTERECTOMY  2009    for uterine myoma and painful periods; has ovaries       FamHx:  Family History   Problem Relation Age of Onset     Cancer Mother         BCC     Skin Cancer Mother      Cancer Brother         BCC     Brain Cancer Paternal Uncle      Pancreatic Cancer Paternal Uncle      Cancer - colorectal  No family hx of      Breast Cancer No family hx of      Melanoma No family hx of        Allergies:     Allergies   Allergen Reactions     Nkda [No Known Drug Allergies]        Meds:    Current Outpatient Medications:      cyclobenzaprine (FLEXERIL) 10 MG tablet, Take 1 tablet (10 mg) by mouth nightly as needed for muscle spasms, Disp: 10 tablet, Rfl: 0     diclofenac (VOLTAREN) 1 % topical gel, Apply 4 g topically 4 times daily Apply gel to affected area., Disp: 350 g, Rfl: 0     estradiol (VIVELLE-DOT) 0.05 MG/24HR bi-weekly patch, Place 1 patch onto the skin twice a week, Disp: 24 patch, Rfl: 0     famotidine (PEPCID) 40 MG tablet, Take 1 tablet (40 mg) by mouth At Bedtime, Disp: 90 tablet, Rfl: 2     mupirocin (BACTROBAN) 2 % external ointment, ALYX EXT TO THE SKIN TID, Disp: , Rfl: 0     propranolol (INDERAL) 40 MG tablet, Take 1 tablet (40 mg) by mouth daily as needed (stress), Disp: 30 tablet, Rfl: 1    Current Facility-Administered Medications:      lidocaine 1% with EPINEPHrine 1:100,000 injection 3 mL, 3 mL, Intradermal, Once, Cuate Ambrocio MD    SocHx:  Social History     Socioeconomic History     Marital status: Significant other     Spouse name: Not on file     Number of children: 0     Years of education: Not on file     Highest education level: Not on file   Occupational History     Occupation: research     Employer: [employer]   Social Needs     Financial resource strain: Not on file     Food insecurity     Worry: Not on file     Inability: Not on file     Transportation needs     Medical: Not on file     Non-medical: Not on file   Tobacco Use     Smoking status: Never Smoker     Smokeless tobacco: Never Used   Substance and Sexual Activity     Alcohol use: No     Alcohol/week: 0.0 standard drinks     Drug use: No     Sexual activity: Yes     Partners: Male     Birth control/protection: Surgical     Comment: hyst   Lifestyle     Physical activity     Days per week: Not on file      Minutes per session: Not on file     Stress: Not on file   Relationships     Social connections     Talks on phone: Not on file     Gets together: Not on file     Attends Yazidi service: Not on file     Active member of club or organization: Not on file     Attends meetings of clubs or organizations: Not on file     Relationship status: Not on file     Intimate partner violence     Fear of current or ex partner: Not on file     Emotionally abused: Not on file     Physically abused: Not on file     Forced sexual activity: Not on file   Other Topics Concern     Parent/sibling w/ CABG, MI or angioplasty before 65F 55M? 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    One son, born by surrogacy in [place] in 2014.    Getting  in June 2017.       Problem, Medication and Allergy Lists were reviewed and are current.  Patient is an established patient of this clinic.         Review of Systems:     KM  I have personally reviewed and updated the complete ROS on the day of the visit.           Physical Exam:   There were no vitals taken for this visit.  There is no height or weight on file to calculate BMI.     PE limited given nature of encounter.    Examined via video telephone R flank of patient. No evidence of erythema, bruising, or rash. Patient endorses painwhen she touches the involved area.      Results:     Office Visit on 01/27/2020   Component Date Value Ref Range Status     Copath Report 01/27/2020    Final                    Value:Patient Name: CHARLOTTE JAIME  MR#: [id number]  Specimen #:   Collected: 1/27/2020  Received: 1/27/2020  Reported: 1/29/2020 18:18  Ordering Phy(s): KATIE GONZALEZ    For improved result formatting, select 'View Enhanced Report Format' under   Linked Documents section.    SPECIMEN(S):  Skin, right medial calf, shave    FINAL DIAGNOSIS:  Skin, right medial calf, shave:  - Basal cell carcinoma, superficial and nodular types, extending to the   lateral  "margin - (see description)    I have personally reviewed all specimens and/or slides, including the   listed special stains, and used them  with my medical judgement to determine or confirm the final diagnosis.    Electronically signed out by:    Nicolas Ellsworth M.D., PhD, Physicians    CLINICAL HISTORY:  The patient is a 49-year-old female    GROSS:  The specimen is received in formalin with proper patient identification,   labeled \"right medial calf\".  The  specimen consists of two irregular skin shave biopsies ranging from   0.4-                          0.6 cm in greatest dimension.  The  skin surface is remarkable for a tan-brown diffuse probable lesion.  The   surgical margins are inked blue.  Specimens are submitted intact in cassette A1. (Dictated by: Merritt LEÓN 1/28/2020 01:53 PM)    MICROSCOPIC:  The specimen exhibits a tumor of atypical basaloid epithelium arranged as   buds off the epidermal undersurface  and as islands in the upper dermis, with fibromyxoid stroma and clefting   artifact.    The technical component of this testing was completed at the [facility], with the professional component performed   at the [facility], [address] [place] [id number] ([phone])    CPT Codes:  A: 77376-RS5.P, 71392-NB0.T    COLLECTION SITE:  Client: [facility]  Location: [facility] (B)           Lab Results   Component Value Date    WBC 6.8 12/17/2019    HGB 13.8 12/17/2019    HCT 41.1 12/17/2019     12/17/2019     12/17/2019    POTASSIUM 4.2 12/17/2019    CHLORIDE 105 12/17/2019    CO2 27 12/17/2019    BUN 13 12/17/2019    CR 0.57 12/17/2019    GLC 86 12/17/2019    AST 29 06/06/2017    ALT 53 (H) 06/06/2017    ALKPHOS 169 (H) 06/06/2017    BILITOTAL 0.5 06/06/2017       Assessment and Plan     This is a 50 year old " healthy woman with chronic low back pain and no significant PMH who was seen today for acute R flank pain, likely the result of muscle strain +/- rib fracture due to traumatic injury.    At this time, plan for her will consist in conservative management with muscle relaxant and topical NSAID, as well as ruling-out fracture with film of ribs.    Rib pain on right side  -     XR Ribs Bilateral 3 Views; Future  -     diclofenac (VOLTAREN) 1 % topical gel 4 g  -     cyclobenzaprine (FLEXERIL) 10 MG tablet; Take 1 tablet (10 mg) by mouth nightly as needed for muscle spasms    Patient advised to avoid driving or operating heavy machinery after using cyclobenzaprine.    Patient advised to return to clinic in 4 weeks (or sooner) if symptoms worsen or fail to improve.    Options for treatment and follow-up care were reviewed with the patient. Shannan Harris engaged in the decision making process and verbalized understanding of the options discussed and agreed with the final plan.    Matt Angel MD PhD  Jan 5, 2021    Pt was seen and plan of care discussed with Dr Cool.     I personally reviewed the pertinent medical history and results.  I discussed the patient s diagnosis and treatment plan with the resident and the resident relayed our joint plan to the patient in synchrony. I agree with the information as documented with the following exceptions: none.  Merly Cool MD  Internal Medicine

## 2021-01-05 NOTE — NURSING NOTE
Chief Complaint   Patient presents with     Rib Pain     pt is having rib pian from a fall on 1/2/21     Video Visit Technology for this patient: Jan Video Visit- Patient was left in waiting room     Renetta Carbone LPN at 8:55 AM on 1/5/2021.

## 2021-01-06 DIAGNOSIS — R07.81 RIB PAIN: Primary | ICD-10-CM

## 2021-01-07 ENCOUNTER — ANCILLARY PROCEDURE (OUTPATIENT)
Dept: GENERAL RADIOLOGY | Facility: CLINIC | Age: 51
End: 2021-01-07
Attending: FAMILY MEDICINE
Payer: COMMERCIAL

## 2021-01-07 DIAGNOSIS — R07.81 RIB PAIN: ICD-10-CM

## 2021-01-07 PROCEDURE — 71110 X-RAY EXAM RIBS BIL 3 VIEWS: CPT | Performed by: RADIOLOGY

## 2021-01-12 ENCOUNTER — OFFICE VISIT (OUTPATIENT)
Dept: INTERNAL MEDICINE | Facility: CLINIC | Age: 51
End: 2021-01-12
Payer: COMMERCIAL

## 2021-01-12 VITALS
HEART RATE: 75 BPM | WEIGHT: 130 LBS | OXYGEN SATURATION: 99 % | SYSTOLIC BLOOD PRESSURE: 105 MMHG | DIASTOLIC BLOOD PRESSURE: 71 MMHG | BODY MASS INDEX: 23.92 KG/M2

## 2021-01-12 DIAGNOSIS — R07.81 RIB PAIN ON RIGHT SIDE: Primary | ICD-10-CM

## 2021-01-12 PROCEDURE — 99213 OFFICE O/P EST LOW 20 MIN: CPT | Mod: GE | Performed by: STUDENT IN AN ORGANIZED HEALTH CARE EDUCATION/TRAINING PROGRAM

## 2021-01-12 NOTE — PROGRESS NOTES
PRIMARY CARE CENTER         HPI:       Shannan Harris is a 50 year old female with no significant past medical history who presents to clinic for: other (right side pain - about a week, sledding accident)    Patient is presenting in follow up after recent virtual visit with Dr. Angel on 1/5/21, which was 5 days afte she suffered right flank injury during a sledding accident.  Patient was involved in this sledding accident with her children about 12 days ago, during which she tumbed down a hill after a large man in a rigid plastic sled struck her right flank.  She did not have LOC.  About 2 hours after this accident, she developed severe right flank pain.  She did note some soft tissue swelling over her right flank after the injury.  She saw Dr. Angel and was prescribed Flexeril, Voltaren gel and right rib XR series, which did not show any rib fracture.  Since seeing Dr. Angel, she can no longer take deep breaths.  Has been taking ibuprofen 1200 mg daily total, but had to stop for a few days after she developed stomach pain. Only took 4 cyclobenzaprine pills because the drowsiness side effect was unacceptable to her.      She does have chronic back pain.  Also experienced back pain after this accident.      PMHx:  Past Medical History:   Diagnosis Date     Gastroesophageal reflux disease      Hot flashes        PSHx:  Past Surgical History:   Procedure Laterality Date     BIOPSY OF SKIN LESION       HYSTERECTOMY  2009    for uterine myoma and painful periods; has ovaries       FamHx:  Family History   Problem Relation Age of Onset     Cancer Mother         BCC     Skin Cancer Mother      Cancer Brother         BCC     Brain Cancer Paternal Uncle      Pancreatic Cancer Paternal Uncle      Cancer - colorectal No family hx of      Breast Cancer No family hx of      Melanoma No family hx of        Allergies:     Allergies   Allergen Reactions     Nkda [No Known Drug Allergies]        Meds:    Current Outpatient  Medications:      diclofenac (VOLTAREN) 1 % topical gel, Apply 4 g topically 4 times daily Apply gel to affected area., Disp: 350 g, Rfl: 0     estradiol (VIVELLE-DOT) 0.05 MG/24HR bi-weekly patch, Place 1 patch onto the skin twice a week, Disp: 24 patch, Rfl: 0     famotidine (PEPCID) 40 MG tablet, Take 1 tablet (40 mg) by mouth At Bedtime, Disp: 90 tablet, Rfl: 2     omeprazole (PRILOSEC) 20 MG DR capsule, Take 1 capsule (20 mg) by mouth daily, Disp: 60 capsule, Rfl: 1     cyclobenzaprine (FLEXERIL) 10 MG tablet, Take 1 tablet (10 mg) by mouth nightly as needed for muscle spasms (Patient not taking: Reported on 1/12/2021), Disp: 10 tablet, Rfl: 0     propranolol (INDERAL) 40 MG tablet, Take 1 tablet (40 mg) by mouth daily as needed (stress) (Patient not taking: Reported on 1/12/2021), Disp: 30 tablet, Rfl: 1    Current Facility-Administered Medications:      lidocaine 1% with EPINEPHrine 1:100,000 injection 3 mL, 3 mL, Intradermal, Once, Cuate Ambrocio MD    SocHx:  Social History     Socioeconomic History     Marital status: Significant other     Spouse name: None     Number of children: 0     Years of education: None     Highest education level: None   Occupational History     Occupation: research     Employer: [employer]   Social Needs     Financial resource strain: None     Food insecurity     Worry: None     Inability: None     Transportation needs     Medical: None     Non-medical: None   Tobacco Use     Smoking status: Never Smoker     Smokeless tobacco: Never Used   Substance and Sexual Activity     Alcohol use: No     Alcohol/week: 0.0 standard drinks     Drug use: No     Sexual activity: Yes     Partners: Male     Birth control/protection: Surgical     Comment: hyst   Lifestyle     Physical activity     Days per week: None     Minutes per session: None     Stress: None   Relationships     Social connections     Talks on phone: None     Gets together: None     Attends Islam service:  None     Active member of club or organization: None     Attends meetings of clubs or organizations: None     Relationship status: None     Intimate partner violence     Fear of current or ex partner: None     Emotionally abused: None     Physically abused: None     Forced sexual activity: None   Other Topics Concern     Parent/sibling w/ CABG, MI or angioplasty before 65F 55M? 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    One son, born by surrogacy in [place] in 2014.    Getting  in June 2017.       Problem, Medication and Allergy Lists were reviewed and are current.  Patient is an established patient of this clinic.         Review of Systems:   ROS  I have personally reviewed and updated the complete ROS on the day of the visit.           Physical Exam:   /71 (BP Location: Left arm, Patient Position: Sitting, Cuff Size: Adult Regular)   Pulse 75   Wt 59 kg (130 lb)   SpO2 99%   BMI 23.92 kg/m    Body mass index is 23.92 kg/m .  Vitals were reviewed       GENERAL APPEARANCE: healthy, alert and no distress     EYES: EOMI, PERRL     RESP: lungs clear to auscultation - no rales, rhonchi or wheezes     CV: regular rates and rhythm, normal S1 S2, no S3 or S4 and no murmur, click or rub     ABDOMEN:  soft, nontender, no HSM or masses and bowel sounds normal     MS: Very slight soft tissue swelling over right flank around ribs 8-10.  Minimal tenderness to palpation over right flank.  No obvious palpable rib fracture. No midline spinal tenderness.      SKIN: no suspicious lesions or rashes     NEURO: Normal strength and tone, sensory exam grossly normal, mentation intact and speech normal     PSYCH: mentation appears normal. and affect normal/bright        Results:     Office Visit on 01/27/2020   Component Date Value Ref Range Status     Copath Report 01/27/2020    Final                    Value:Patient Name: CHARLOTTE JAIME  MR#: [id number]  Specimen #:   Collected:  "1/27/2020  Received: 1/27/2020  Reported: 1/29/2020 18:18  Ordering Phy(s): KATIE GONZALEZ    For improved result formatting, select 'View Enhanced Report Format' under   Linked Documents section.    SPECIMEN(S):  Skin, right medial calf, shave    FINAL DIAGNOSIS:  Skin, right medial calf, shave:  - Basal cell carcinoma, superficial and nodular types, extending to the   lateral margin - (see description)    I have personally reviewed all specimens and/or slides, including the   listed special stains, and used them  with my medical judgement to determine or confirm the final diagnosis.    Electronically signed out by:    Nicolas Ellsworth M.D., PhD, [facility]    CLINICAL HISTORY:  The patient is a 49-year-old female    GROSS:  The specimen is received in formalin with proper patient identification,   labeled \"right medial calf\".  The  specimen consists of two irregular skin shave biopsies ranging from   0.4-                          0.6 cm in greatest dimension.  The  skin surface is remarkable for a tan-brown diffuse probable lesion.  The   surgical margins are inked blue.  Specimens are submitted intact in cassette A1. (Dictated by: Merritt LEÓN 1/28/2020 01:53 PM)    MICROSCOPIC:  The specimen exhibits a tumor of atypical basaloid epithelium arranged as   buds off the epidermal undersurface  and as islands in the upper dermis, with fibromyxoid stroma and clefting   artifact.    The technical component of this testing was completed at the [facility], with the professional component performed   at the [facility], [address] [place] [id number] ([phone])    CPT Codes:  A: 69644-XP3.P, 72366-VY3.T    COLLECTION SITE:  [facility]  Location: [facility] (B)           Lab Results   Component Value Date    WBC 6.8 12/17/2019    HGB 13.8 " 12/17/2019    HCT 41.1 12/17/2019     12/17/2019     12/17/2019    POTASSIUM 4.2 12/17/2019    CHLORIDE 105 12/17/2019    CO2 27 12/17/2019    BUN 13 12/17/2019    CR 0.57 12/17/2019    GLC 86 12/17/2019    AST 29 06/06/2017    ALT 53 (H) 06/06/2017    ALKPHOS 169 (H) 06/06/2017    BILITOTAL 0.5 06/06/2017       Assessment and Plan     Shannan was seen today for other.    Diagnoses and all orders for this visit:    Rib pain on right side  Suspect rib fracture (sensitivity of rib xray is still fairly low) versus muscle strain, although liver or pulmonary contusions are also a possibility.  Hematoma also a possibility.  Overall, she has remained stable from a hemodynamic and respiratory standpoint 12 days after her injury, so I do not suspect any serious injury such as internal bleeding.  CXR ruled out pneumothorax.  It is resonable to evaluate with US to look for effusion, evidence of contusion or hematoma.          -     Alternate ibuprofen 600-800 mg (1800 mg daily limit) with Tylenol 500-1000 mg (4 g limit)        -     Continue Voltaren gel         -     Encouraged use of heating pad.         -     Advised patient to practice daily deep breathing exercises to avoid atelectasis and secondary PNA  -     omeprazole (PRILOSEC) 20 MG DR capsule; Take 1 capsule (20 mg) by mouth daily  -     US Abdomen Limited; Future     Right 5th digit PIP pain  -     XR Hand Right G/E 3 Views; Future     Options for treatment and follow-up care were reviewed with the patient. Shannan Harris engaged in the decision making process and verbalized understanding of the options discussed and agreed with the final plan.    Rob Tan, DO  Jan 12, 2021    Pt was seen and plan of care discussed with Dr. Hammer.

## 2021-01-12 NOTE — NURSING NOTE
Chief Complaint   Patient presents with     other     right side pain - about a week, sledding accident       Audrey Medina MA, at 12:35 PM on 1/12/2021.

## 2021-01-12 NOTE — PATIENT INSTRUCTIONS
[facility] Medication Refill Request Information:  * Please contact your pharmacy regarding ANY request for medication refills.  ** [facility] Prescription Fax = [phone]  * Please allow 3 business days for routine medication refills.  * Please allow 5 business days for controlled substance medication refills.     [facility] Test Result notification information:  *You will be notified with in 7-10 days of your appointment day regarding the results of your test.  If you are on MyChart you will be notified as soon as the provider has reviewed the results and signed off on them.    [facility]: [phone]     Imaging [phone]

## 2021-01-15 ENCOUNTER — ANCILLARY PROCEDURE (OUTPATIENT)
Dept: GENERAL RADIOLOGY | Facility: CLINIC | Age: 51
End: 2021-01-15
Attending: INTERNAL MEDICINE
Payer: COMMERCIAL

## 2021-01-15 ENCOUNTER — ANCILLARY PROCEDURE (OUTPATIENT)
Dept: ULTRASOUND IMAGING | Facility: CLINIC | Age: 51
End: 2021-01-15
Attending: INTERNAL MEDICINE
Payer: COMMERCIAL

## 2021-01-15 DIAGNOSIS — R07.81 RIB PAIN ON RIGHT SIDE: ICD-10-CM

## 2021-01-15 LAB — RADIOLOGIST FLAGS: NORMAL

## 2021-01-15 PROCEDURE — 76705 ECHO EXAM OF ABDOMEN: CPT | Performed by: RADIOLOGY

## 2021-01-15 PROCEDURE — 73130 X-RAY EXAM OF HAND: CPT | Mod: RT | Performed by: RADIOLOGY

## 2021-01-19 DIAGNOSIS — N95.1 MENOPAUSAL HOT FLUSHES: ICD-10-CM

## 2021-01-19 RX ORDER — ESTRADIOL 0.05 MG/D
1 PATCH, EXTENDED RELEASE TRANSDERMAL
Qty: 24 PATCH | Refills: 0 | Status: SHIPPED | OUTPATIENT
Start: 2021-01-21 | End: 2021-04-27

## 2021-01-19 NOTE — TELEPHONE ENCOUNTER
Received refill request for estradiol patches.  Last in clinic 10/2018.      Shannan cancelled an annual scheduled for 3/2020 due to covid, and was reminded in 8/2020 that these appointments are now available and that she should schedule.  She has not yet scheduled an appointment.    Sent my chart reminder to patient and routed request to Dr Blanton for review.

## 2021-02-10 ENCOUNTER — DOCUMENTATION ONLY (OUTPATIENT)
Dept: CARE COORDINATION | Facility: CLINIC | Age: 51
End: 2021-02-10

## 2021-03-04 ENCOUNTER — OFFICE VISIT (OUTPATIENT)
Dept: DERMATOLOGY | Facility: CLINIC | Age: 51
End: 2021-03-04
Payer: COMMERCIAL

## 2021-03-04 DIAGNOSIS — L82.0 SEBORRHEIC KERATOSES, INFLAMED: ICD-10-CM

## 2021-03-04 DIAGNOSIS — D48.5 NEOPLASM OF UNCERTAIN BEHAVIOR OF SKIN: Primary | ICD-10-CM

## 2021-03-04 DIAGNOSIS — Z85.828 HISTORY OF NONMELANOMA SKIN CANCER: ICD-10-CM

## 2021-03-04 PROCEDURE — 88305 TISSUE EXAM BY PATHOLOGIST: CPT | Performed by: DERMATOLOGY

## 2021-03-04 PROCEDURE — 99213 OFFICE O/P EST LOW 20 MIN: CPT | Mod: 25 | Performed by: DERMATOLOGY

## 2021-03-04 PROCEDURE — 11103 TANGNTL BX SKIN EA SEP/ADDL: CPT | Mod: GC | Performed by: DERMATOLOGY

## 2021-03-04 PROCEDURE — 11102 TANGNTL BX SKIN SINGLE LES: CPT | Mod: GC | Performed by: DERMATOLOGY

## 2021-03-04 ASSESSMENT — PAIN SCALES - GENERAL: PAINLEVEL: NO PAIN (0)

## 2021-03-04 NOTE — NURSING NOTE
Chief Complaint   Patient presents with     Skin Check     Shannan, is here for a skin check, she states she has some areas on her legs and face she wants checked out.     Nash Resendiz EMT

## 2021-03-04 NOTE — PATIENT INSTRUCTIONS

## 2021-03-04 NOTE — LETTER
3/4/2021       RE: Shannan Harris  [address]  [place] [address]     Dear Colleague,    Thank you for referring your patient, Shannan Harris, to the [facility] DERMATOLOGY [facility] [place] at [facility]. Please see a copy of my visit note below.    [facility] Dermatology Note   Office Visit     Encounter Date: Mar 4, 2021    Dermatology Problem List:  FBSE 03/04/21  # NUB  - R anterior shin, bx 3/4/21  - L ankle, bx 3/4/21  # Hx of NMSC  - BCC, R medial lower leg, s/p Mohs 1/27/20  - BCC, R medial calf, s/p Mohs 1/27/20  - BCC, L shin medial, s/p ED&C 11/21/19  - BCC, L shin lateral, s/p ED&C 11/21/19  - BCC, L upper chest status post ED&C.    - BCC, abdomen  - BCC, R flank  # Tinea pedis  # Hx dysplastic nevus  - mod, central upper back s/p exc8/11/2016  # AK, LN2  ___________________________________________    ASSESSMENT/PLAN:    # Neoplasm of uncertain behavior x 2  * new uncomplicated  - R anterior shin: BCC vs BLK  - L ankle: BCC vs BLK  - shave biopsy (see procedure note)    # History of multiple NMSC  - no evidence of recurrence at prior surgical excision sites    # Benign skin findings include seborrheic keratosis, benign nevi, lentigines, cherry angioma  - lesions benign nature, no treatment is indicated at this time, will monitor for any clinical changes  - ABCD's of melanoma and signs of possible non-melanoma skin cancer were reviewed with patient    PROCEDURES:  Shave biopsy procedure note  - location(s): right shin, left ankle  After discussion of benefits and risks including but not limited to bleeding/bruising, pain/swelling, infection, scar, incomplete removal, nerve damage/numbness, recurrence, and non-diagnostic biopsy, written consent, verbal consent and photographs obtained, time-out performed, area cleaned with alcohol, 1% lidocaine injected to obtain anesthesia, shave biopsy performed, hemostasis achieved with  aluminum chloride, Vaseline and bandage applied, post-care instructions provided    Follow-up: 1 year for skin exam  Faculty: Dr. Ambrocio    CC Referred Self, MD  No address on file on close of this encounter.    Staff Involved:  Resident/Staff    Edel Don MD  Dermatology Resident  [facility]    I have seen and examined this patient and agree with the assessment and plan as documented in the resident's note, and was present for all procedures.    Cuate Ambrocio MD  Dermatology Attending    ___________________________________________      CC: Skin Check (Shannan, is here for a skin check, she states she has some areas on her legs and face she wants checked out. )      HPI:  Ms. Shanann Harris is a(n) 51 year old female (Venkatesh: I) who presents to clinic today as a return patient.   - here for skin check  - has one pink bump on both shins, not painful or bleeding but reminds her or her previous basal cell carcinomas on the legs  - is a researcher in surgical oncology, researches viral therapies  - denies other concerning lesions  - denies history of tanning bed use, no history of blistering sunburns, uses sunscreen and wears hats and long-sleeved clothing when outside, no personal or family history of melanoma  - otherwise feeling well in usual state of health    Labs/Imaging:  Labs reviewed: none    Physical exam:  General: in no acute distress, well-developed, well-nourished  Eyes: conjunctivae clear  Pulmonary: breathing comfortably in no distress  CV: well-perfused, no cyanosis  Extremities: no deformity, no edema  Lymphatic: no lymphadenopathy    Skin: Full skin, which includes the head/face, both arms, chest, back, abdomen,both legs, genitalia and/or groin buttocks, digits and/or nails, was examined.  - skin type: fair   - telangiectatic pink papule: right medial shin, left ankle  - rare waxy stuck-on tan to brown macules and papules: back  - well-defined, symmetric brown pigmented macules and papules  with a reassuring pattern on dermoscopy: left back, legs  - diffuse regular brown pigmented macules: shoulders, arms  - surgical scars at sites of prior skin cancers with no evidence of recurrence          Medications:  Current Outpatient Medications   Medication     cyclobenzaprine (FLEXERIL) 10 MG tablet     diclofenac (VOLTAREN) 1 % topical gel     estradiol (VIVELLE-DOT) 0.05 MG/24HR bi-weekly patch     famotidine (PEPCID) 40 MG tablet     omeprazole (PRILOSEC) 20 MG DR capsule     propranolol (INDERAL) 40 MG tablet     Current Facility-Administered Medications   Medication     lidocaine 1% with EPINEPHrine 1:100,000 injection 3 mL      Past Medical/Surgical History:   Patient Active Problem List   Diagnosis     Multiple melanocytic nevi     SK (seborrheic keratosis)     History of nonmelanoma skin cancer     History of basal cell carcinoma     Hot flashes     Neoplasm of uncertain behavior of skin     Tinea pedis of both feet     Nondisplaced fracture of fifth left metatarsal bone with routine healing     Left foot pain     History of basal cell cancer     Tinea pedis, unspecified laterality     Nasal obstruction     Deviated nasal septum     Nasal valve collapse     Seborrheic keratoses, inflamed     Past Medical History:   Diagnosis Date     Gastroesophageal reflux disease      Hot flashes      Past Surgical History:   Procedure Laterality Date     BIOPSY OF SKIN LESION       HYSTERECTOMY  2009

## 2021-03-04 NOTE — PROGRESS NOTES
[facility] Dermatology Note   Office Visit     Encounter Date: Mar 4, 2021    Dermatology Problem List:  FBSE 03/04/21  # NUB  - R anterior shin, bx 3/4/21  - L ankle, bx 3/4/21  # Hx of NMSC  - BCC, R medial lower leg, s/p Mohs 1/27/20  - BCC, R medial calf, s/p Mohs 1/27/20  - BCC, L shin medial, s/p ED&C 11/21/19  - BCC, L shin lateral, s/p ED&C 11/21/19  - BCC, L upper chest status post ED&C.    - BCC, abdomen  - BCC, R flank  # Tinea pedis  # Hx dysplastic nevus  - mod, central upper back s/p exc8/11/2016  # AK, LN2  ___________________________________________    ASSESSMENT/PLAN:    # Neoplasm of uncertain behavior x 2  * new uncomplicated  - R anterior shin: BCC vs BLK  - L ankle: BCC vs BLK  - shave biopsy (see procedure note)    # History of multiple NMSC  - no evidence of recurrence at prior surgical excision sites    # Benign skin findings include seborrheic keratosis, benign nevi, lentigines, cherry angioma  - lesions benign nature, no treatment is indicated at this time, will monitor for any clinical changes  - ABCD's of melanoma and signs of possible non-melanoma skin cancer were reviewed with patient    PROCEDURES:  Shave biopsy procedure note  - location(s): right shin, left ankle  After discussion of benefits and risks including but not limited to bleeding/bruising, pain/swelling, infection, scar, incomplete removal, nerve damage/numbness, recurrence, and non-diagnostic biopsy, written consent, verbal consent and photographs obtained, time-out performed, area cleaned with alcohol, 1% lidocaine injected to obtain anesthesia, shave biopsy performed, hemostasis achieved with aluminum chloride, Vaseline and bandage applied, post-care instructions provided    Follow-up: 1 year for skin exam  Faculty: Dr. Ambrocio    CC Referred MD Massimo  No address on file on close of this encounter.    Staff Involved:  Resident/Staff    Edel Don MD  Dermatology Resident  [facility]    I have  seen and examined this patient and agree with the assessment and plan as documented in the resident's note, and was present for all procedures.    Cuate Ambrocio MD  Dermatology Attending    ___________________________________________      CC: Skin Check (Shannan, is here for a skin check, she states she has some areas on her legs and face she wants checked out. )      HPI:  Ms. Shannan Harris is a(n) 51 year old female (Venkatesh: I) who presents to clinic today as a return patient.   - here for skin check  - has one pink bump on both shins, not painful or bleeding but reminds her or her previous basal cell carcinomas on the legs  - is a researcher in surgical oncology, researches viral therapies  - denies other concerning lesions  - denies history of tanning bed use, no history of blistering sunburns, uses sunscreen and wears hats and long-sleeved clothing when outside, no personal or family history of melanoma  - otherwise feeling well in usual state of health    Labs/Imaging:  Labs reviewed: none    Physical exam:  General: in no acute distress, well-developed, well-nourished  Eyes: conjunctivae clear  Pulmonary: breathing comfortably in no distress  CV: well-perfused, no cyanosis  Extremities: no deformity, no edema  Lymphatic: no lymphadenopathy    Skin: Full skin, which includes the head/face, both arms, chest, back, abdomen,both legs, genitalia and/or groin buttocks, digits and/or nails, was examined.  - skin type: fair   - telangiectatic pink papule: right medial shin, left ankle  - rare waxy stuck-on tan to brown macules and papules: back  - well-defined, symmetric brown pigmented macules and papules with a reassuring pattern on dermoscopy: left back, legs  - diffuse regular brown pigmented macules: shoulders, arms  - surgical scars at sites of prior skin cancers with no evidence of recurrence          Medications:  Current Outpatient Medications   Medication     cyclobenzaprine (FLEXERIL) 10 MG tablet      diclofenac (VOLTAREN) 1 % topical gel     estradiol (VIVELLE-DOT) 0.05 MG/24HR bi-weekly patch     famotidine (PEPCID) 40 MG tablet     omeprazole (PRILOSEC) 20 MG DR capsule     propranolol (INDERAL) 40 MG tablet     Current Facility-Administered Medications   Medication     lidocaine 1% with EPINEPHrine 1:100,000 injection 3 mL      Past Medical/Surgical History:   Patient Active Problem List   Diagnosis     Multiple melanocytic nevi     SK (seborrheic keratosis)     History of nonmelanoma skin cancer     History of basal cell carcinoma     Hot flashes     Neoplasm of uncertain behavior of skin     Tinea pedis of both feet     Nondisplaced fracture of fifth left metatarsal bone with routine healing     Left foot pain     History of basal cell cancer     Tinea pedis, unspecified laterality     Nasal obstruction     Deviated nasal septum     Nasal valve collapse     Seborrheic keratoses, inflamed     Past Medical History:   Diagnosis Date     Gastroesophageal reflux disease      Hot flashes      Past Surgical History:   Procedure Laterality Date     BIOPSY OF SKIN LESION       HYSTERECTOMY  2009

## 2021-03-08 LAB — COPATH REPORT: NORMAL

## 2021-03-09 ENCOUNTER — TELEPHONE (OUTPATIENT)
Dept: DERMATOLOGY | Facility: CLINIC | Age: 51
End: 2021-03-09

## 2021-03-09 NOTE — TELEPHONE ENCOUNTER
[facility] Call Center    Phone Message    May a detailed message be left on voicemail: yes     Reason for Call: Other: Pt called and said that her biopsy came back and it states that she has basal carcinoma. Pt wants to know what are the next steps to remove this. Pt said she did not get any direction. Please call her back. Thanks     Action Taken: Message routed to:  [facility] DERM    Travel Screening: Not Applicable

## 2021-03-10 NOTE — TELEPHONE ENCOUNTER
[facility] Call Center    Phone Message    May a detailed message be left on voicemail: yes   Also, please follow up with a [employer] message.    Reason for Call: Other: `      Pt returning call to Clinic to schedule Follow-up appt with Dr Ambrocio.    Pt also is looking for information on what the next steps are for her with her biopsy results.    Please return call to Pt and send a message through [employer] as well.    Action Taken: Message routed to:  [facility] Derm    Travel Screening: Not Applicable

## 2021-03-10 NOTE — TELEPHONE ENCOUNTER
Spoke with Shannan, she prefers to discuss treatment options with Dr. Ambrocio. Telephone visit scheduled for tomorrow.

## 2021-03-11 ENCOUNTER — VIRTUAL VISIT (OUTPATIENT)
Dept: DERMATOLOGY | Facility: CLINIC | Age: 51
End: 2021-03-11
Payer: COMMERCIAL

## 2021-03-11 DIAGNOSIS — C44.91 SUPERFICIAL BASAL CELL CARCINOMA: Primary | ICD-10-CM

## 2021-03-11 PROCEDURE — 99213 OFFICE O/P EST LOW 20 MIN: CPT | Mod: 95 | Performed by: DERMATOLOGY

## 2021-03-11 RX ORDER — IMIQUIMOD 12.5 MG/.25G
CREAM TOPICAL
Qty: 12 PACKET | Refills: 3 | Status: SHIPPED | OUTPATIENT
Start: 2021-03-11 | End: 2022-12-02

## 2021-03-11 NOTE — NURSING NOTE
Dermatology Rooming Note    Shannan Harris's goals for this visit include:   Chief Complaint   Patient presents with     Derm Problem     discuss treatment for BCC     Jacque Harp CMA

## 2021-03-11 NOTE — PROGRESS NOTES
[facility] Dermatology Note  Encounter Date: Mar 11, 2021  Store-and-Forward and Telephone ([phone]). Location of teledermatologist: [facility] DERMATOLOGY CLIN[facility] [place].  Start time: 1:05. End time: 116.    Dermatology Problem List:  FBSE 03/04/21  # NUB  - R anterior shin, bx 3/4/21  - L ankle, bx 3/4/21  # Hx of NMSC  - BCC, R medial lower leg, s/p Mohs 1/27/20  - BCC, R medial calf, s/p Mohs 1/27/20  - BCC, L shin medial, s/p ED&C 11/21/19  - BCC, L shin lateral, s/p ED&C 11/21/19  - BCC, L upper chest status post ED&C.    - BCC, abdomen  - BCC, R flank  # Tinea pedis  # Hx dysplastic nevus  - mod, central upper back s/p exc8/11/2016  # AK, LN2    ____________________________________________    Assessment & Plan:     Superficial BCC x2 of lower extremities, bx-proven 3/4/21.  We discussed different treatment options today including Mohs surgery, conventional excision, EDC and topical chemo interventions.  We agreed that given the very small nature of the lesions and the superficial subtype that Aldara would be a reasonable choice in this case.  - Start Aldara cream once daily to each of two biopsy sites, 5 days per week for a total of 6 weeks  - Take breaks if skin becoming too irritated  - Plan to followup 8 weeks after completing treatment to verify resolution      Cuate Ambrocio MD  Dermatology Attending    ____________________________________________    CC: Derm Problem (discuss treatment for BCC)    HPI:  Ms. Shannan Harris is a(n) 51 year old female who presents today for followup on biopsy results from 3/4/21  She had two biopsies at her last clinic visit, both of which demonstrated superficial BCCs, on the right shin and left ankle.  Calling today to discuss treatment options.  Both biopsy sites are healing well.  She has not developed any other new bumps or sores since her last visit.    Patient is otherwise feeling well, without additional skin concerns.    Labs  Reviewed:  Dermpath report 3/4/21.    Physical Exam:  Vitals: There were no vitals taken for this visit.  SKIN: Teledermatology photos were reviewed; image quality and interpretability: acceptable. Image date: 3/4/21.  - Pink 3-4mm flat topped papules of left superior ankle and right shin  - No other lesions of concern on areas examined.     Medications:  Current Outpatient Medications   Medication     cyclobenzaprine (FLEXERIL) 10 MG tablet     diclofenac (VOLTAREN) 1 % topical gel     estradiol (VIVELLE-DOT) 0.05 MG/24HR bi-weekly patch     famotidine (PEPCID) 40 MG tablet     omeprazole (PRILOSEC) 20 MG DR capsule     propranolol (INDERAL) 40 MG tablet     Current Facility-Administered Medications   Medication     lidocaine 1% with EPINEPHrine 1:100,000 injection 3 mL      Past Medical/Surgical History:   Patient Active Problem List   Diagnosis     Multiple melanocytic nevi     SK (seborrheic keratosis)     History of nonmelanoma skin cancer     History of basal cell carcinoma     Hot flashes     Neoplasm of uncertain behavior of skin     Tinea pedis of both feet     Nondisplaced fracture of fifth left metatarsal bone with routine healing     Left foot pain     History of basal cell cancer     Tinea pedis, unspecified laterality     Nasal obstruction     Deviated nasal septum     Nasal valve collapse     Seborrheic keratoses, inflamed     Past Medical History:   Diagnosis Date     Gastroesophageal reflux disease      Hot flashes        CC No referring provider defined for this encounter. on close of this encounter.

## 2021-03-11 NOTE — LETTER
3/11/2021       RE: Shannan Harris  [address]  [place] [address]     Dear Colleague,    Thank you for referring your patient, Shannan Harris, to the [facility] DERMATOLOGY [facility]IN[facility] [place] at [facility]. Please see a copy of my visit note below.    [facility] Dermatology Note  Encounter Date: Mar 11, 2021  Store-and-Forward and Telephone ([phone]). Location of teledermatologist: [facility] DERMATOLOGY [facility] [place].  Start time: 1:05. End time: 116.    Dermatology Problem List:  FBSE 03/04/21  # NUB  - R anterior shin, bx 3/4/21  - L ankle, bx 3/4/21  # Hx of NMSC  - BCC, R medial lower leg, s/p Mohs 1/27/20  - BCC, R medial calf, s/p Mohs 1/27/20  - BCC, L shin medial, s/p ED&C 11/21/19  - BCC, L shin lateral, s/p ED&C 11/21/19  - BCC, L upper chest status post ED&C.    - BCC, abdomen  - BCC, R flank  # Tinea pedis  # Hx dysplastic nevus  - mod, central upper back s/p exc8/11/2016  # AK, LN2    ____________________________________________    Assessment & Plan:     Superficial BCC x2 of lower extremities, bx-proven 3/4/21.  We discussed different treatment options today including Mohs surgery, conventional excision, EDC and topical chemo interventions.  We agreed that given the very small nature of the lesions and the superficial subtype that Aldara would be a reasonable choice in this case.  - Start Aldara cream once daily to each of two biopsy sites, 5 days per week for a total of 6 weeks  - Take breaks if skin becoming too irritated  - Plan to followup 8 weeks after completing treatment to verify resolution      Cuate Ambrocio MD  Dermatology Attending    ____________________________________________    CC: Derm Problem (discuss treatment for BCC)    HPI:  Ms. Shannan Harris is a(n) 51 year old female who presents today for followup on biopsy results from 3/4/21  She had two biopsies at her last clinic visit,  both of which demonstrated superficial BCCs, on the right shin and left ankle.  Calling today to discuss treatment options.  Both biopsy sites are healing well.  She has not developed any other new bumps or sores since her last visit.    Patient is otherwise feeling well, without additional skin concerns.    Labs Reviewed:  Dermpath report 3/4/21.    Physical Exam:  Vitals: There were no vitals taken for this visit.  SKIN: Teledermatology photos were reviewed; image quality and interpretability: acceptable. Image date: 3/4/21.  - Pink 3-4mm flat topped papules of left superior ankle and right shin  - No other lesions of concern on areas examined.     Medications:  Current Outpatient Medications   Medication     cyclobenzaprine (FLEXERIL) 10 MG tablet     diclofenac (VOLTAREN) 1 % topical gel     estradiol (VIVELLE-DOT) 0.05 MG/24HR bi-weekly patch     famotidine (PEPCID) 40 MG tablet     omeprazole (PRILOSEC) 20 MG DR capsule     propranolol (INDERAL) 40 MG tablet     Current Facility-Administered Medications   Medication     lidocaine 1% with EPINEPHrine 1:100,000 injection 3 mL      Past Medical/Surgical History:   Patient Active Problem List   Diagnosis     Multiple melanocytic nevi     SK (seborrheic keratosis)     History of nonmelanoma skin cancer     History of basal cell carcinoma     Hot flashes     Neoplasm of uncertain behavior of skin     Tinea pedis of both feet     Nondisplaced fracture of fifth left metatarsal bone with routine healing     Left foot pain     History of basal cell cancer     Tinea pedis, unspecified laterality     Nasal obstruction     Deviated nasal septum     Nasal valve collapse     Seborrheic keratoses, inflamed     Past Medical History:   Diagnosis Date     Gastroesophageal reflux disease      Hot flashes        CC No referring provider defined for this encounter. on close of this encounter.

## 2021-04-08 PROBLEM — C44.91 SUPERFICIAL BASAL CELL CARCINOMA: Status: ACTIVE | Noted: 2021-04-08

## 2021-04-11 ENCOUNTER — HEALTH MAINTENANCE LETTER (OUTPATIENT)
Age: 51
End: 2021-04-11

## 2021-04-22 ENCOUNTER — MYC MEDICAL ADVICE (OUTPATIENT)
Dept: OBGYN | Facility: CLINIC | Age: 51
End: 2021-04-22

## 2021-04-27 DIAGNOSIS — N95.1 MENOPAUSAL HOT FLUSHES: ICD-10-CM

## 2021-04-27 RX ORDER — ESTRADIOL 0.05 MG/D
1 PATCH, EXTENDED RELEASE TRANSDERMAL
Qty: 12 PATCH | Refills: 0 | Status: SHIPPED | OUTPATIENT
Start: 2021-04-29 | End: 2022-11-01

## 2021-04-27 NOTE — TELEPHONE ENCOUNTER
Refill request received for estradiol patches. Patient last in clinic 2018. Called and left VM for patient to call back to clarify if she was getting this filled by another provider of if she needed it refilled by [facility].     Received message back from patient requesting refill. Short term refill sent.     Called patient and left VM that short term refill was sent and instructed her to call and schedule annual exam for more refills.

## 2021-04-27 NOTE — TELEPHONE ENCOUNTER
Called patient and left VM to call back to discuss refill request since it has been several years since she was seen in clinic. Instructed her to call back to discuss.

## 2021-05-27 DIAGNOSIS — Z12.31 VISIT FOR SCREENING MAMMOGRAM: ICD-10-CM

## 2021-05-27 PROCEDURE — 77067 SCR MAMMO BI INCL CAD: CPT | Mod: GC | Performed by: RADIOLOGY

## 2021-05-27 PROCEDURE — 77063 BREAST TOMOSYNTHESIS BI: CPT | Mod: GC | Performed by: RADIOLOGY

## 2021-05-28 NOTE — NURSING NOTE
Chief Complaint   Patient presents with     Derm Problem     Patient is here today for Mohs of BCC on right medial lower leg above ankle.      Macy Bernstein CMA    
The surgical sites were dressed with Vaseline, Telfa and rolled gauze secured with Micro pore tape and covered with Coban. Wound care instructions were gone over with the patient, all questions were gone over with the patient.   Thais WYNNE CMA  
The procedure was performed independently

## 2021-06-22 NOTE — PROGRESS NOTES
ENT Consultation    Shannan Harris who is a 51 year old female seen in consultation at the request of self.      History of Present Illness - Shannan Harris is a 51 year old female presents for evaluation of right nasal obstruction.  Patient sustained a nasal trauma in 1995 1996.  Since then slowly started having issues breathing on the right side.  Often feels completely blocked.  Sense of smell and taste appear to be intact.  She denies any seasonal perennial allergies.  Denies any sinus infections.  Does get some postnasal drainage but minimal.  She has not tried any specific nasal sprays.        BP Readings from Last 1 Encounters:   01/12/21 105/71       BP noted to be well controlled today in office.     Shannan IS NOT a smoker/uses chewing tobacco.        Past Medical History -   Past Medical History:   Diagnosis Date     Gastroesophageal reflux disease      Hot flashes        Current Medications -   Current Outpatient Medications:      cyclobenzaprine (FLEXERIL) 10 MG tablet, Take 1 tablet (10 mg) by mouth nightly as needed for muscle spasms, Disp: 10 tablet, Rfl: 0     diclofenac (VOLTAREN) 1 % topical gel, Apply 4 g topically 4 times daily Apply gel to affected area., Disp: 350 g, Rfl: 0     estradiol (VIVELLE-DOT) 0.05 MG/24HR bi-weekly patch, Place 1 patch onto the skin twice a week, Disp: 12 patch, Rfl: 0     famotidine (PEPCID) 40 MG tablet, Take 1 tablet (40 mg) by mouth At Bedtime, Disp: 90 tablet, Rfl: 2     imiquimod (ALDARA) 5 % external cream, Apply once daily to biopsy sites 5 days per week,  for 6 weeks; leave on skin for ~8 hours, then remove with mild soap and water., Disp: 12 packet, Rfl: 3     omeprazole (PRILOSEC) 20 MG DR capsule, Take 1 capsule (20 mg) by mouth daily, Disp: 60 capsule, Rfl: 1     propranolol (INDERAL) 40 MG tablet, Take 1 tablet (40 mg) by mouth daily as needed (stress) (Patient not taking: Reported on 3/11/2021), Disp: 30 tablet, Rfl: 1    Current Facility-Administered  Medications:      lidocaine 1% with EPINEPHrine 1:100,000 injection 3 mL, 3 mL, Intradermal, Once, Cuate Ambrocio MD    Allergies -   Allergies   Allergen Reactions     Nkda [No Known Drug Allergies]        Social History -   Social History     Socioeconomic History     Marital status:      Spouse name: Not on file     Number of children: 0     Years of education: Not on file     Highest education level: Not on file   Occupational History     Occupation: research     Employer: [employer]   Social Needs     Financial resource strain: Not on file     Food insecurity     Worry: Not on file     Inability: Not on file     Transportation needs     Medical: Not on file     Non-medical: Not on file   Tobacco Use     Smoking status: Never Smoker     Smokeless tobacco: Never Used   Substance and Sexual Activity     Alcohol use: No     Alcohol/week: 0.0 standard drinks     Drug use: No     Sexual activity: Yes     Partners: Male     Birth control/protection: Surgical     Comment: kelsy   Lifestyle     Physical activity     Days per week: Not on file     Minutes per session: Not on file     Stress: Not on file   Relationships     Social connections     Talks on phone: Not on file     Gets together: Not on file     Attends Caodaism service: Not on file     Active member of club or organization: Not on file     Attends meetings of clubs or organizations: Not on file     Relationship status: Not on file     Intimate partner violence     Fear of current or ex partner: Not on file     Emotionally abused: Not on file     Physically abused: Not on file     Forced sexual activity: Not on file   Other Topics Concern     Parent/sibling w/ CABG, MI or angioplasty before 65F 55M? 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    One son, born by surrogacy in [place] in 2014.    Getting  in June 2017.       Family History -   Family History   Problem Relation Age of Onset     Cancer  Mother         BCC     Skin Cancer Mother      Cancer Brother         BCC     Brain Cancer Paternal Uncle      Pancreatic Cancer Paternal Uncle      Cancer - colorectal No family hx of      Breast Cancer No family hx of      Melanoma No family hx of        Review of Systems - As per HPI and PMHx, otherwise review of system review of the head and neck negative. Otherwise 10+ review of system is negative    Physical Exam  There were no vitals taken for this visit.  BMI: There is no height or weight on file to calculate BMI.    General - The patient is well nourished and well developed, and appears to have good nutritional status.  Alert and oriented to person and place, answers questions and cooperates with examination appropriately.    SKIN - No suspicious lesions or rashes.  Respiration - No respiratory distress.  Head and Face - Normocephalic and atraumatic, with no gross asymmetry noted of the contour of the facial features.  The facial nerve is intact, with strong symmetric movements.    Voice and Breathing - The patient was breathing comfortably without the use of accessory muscles. The patients voice was clear and strong, and had appropriate pitch and quality.    Ears - Bilateral pinna and EACs with normal appearing overlying skin. Tympanic membrane intact with good mobility on pneumatic otoscopy bilaterally. Bony landmarks of the ossicular chain are normal. The tympanic membranes are normal in appearance. No retraction, perforation, or masses.  No fluid or purulence was seen in the external canal or the middle ear.     Eyes - Extraocular movements intact.  Sclera were not icteric or injected, conjunctiva were pink and moist.    Mouth - Examination of the oral cavity showed pink, healthy oral mucosa. No lesions or ulcerations noted.  The tongue was mobile and midline, and the dentition were in good condition.      Throat - The walls of the oropharynx were smooth, pink, moist, symmetric, and had no lesions or  ulcerations.  The tonsillar pillars and soft palate were symmetric.  The uvula was midline on elevation.    Neck - Normal midline excursion of the laryngotracheal complex during swallowing.  Full range of motion on passive movement.  Palpation of the occipital, submental, submandibular, internal jugular chain, and supraclavicular nodes did not demonstrate any abnormal lymph nodes or masses.  The carotid pulse was palpable bilaterally.  Palpation of the thyroid was soft and smooth, with no nodules or goiter appreciated.  The trachea was mobile and midline.    Nose - External contour is symmetric, no gross deflection or scars.  Nasal mucosa is pink and moist with no abnormal mucus.  The septum was deviated to the right and obstructive, turbinates of enlarged size and position.  No polyps, masses, or purulence noted on examination.  I was able to actually visualize each middle turbinate and middle meatus just with anterior rhinoscopy.  Mostly flexion Is inferiorly.  There appears to be a bony spur more inferiorly to the right and anterior cartilage appears to be straight until about mid way when there is shift to the right.  Neuro - Nonfocal neuro exam is normal, CN 2 through 12 intact, normal gait and muscle tone.      Performed in clinic today:  No procedures preformed in clinic today      A/P - Shannan Harris is a 51 year old female with structural issues that is deviated septum possibly posttraumatic in a large turbinates possibly secondary to that.  We discussed options at this point certainly if it is structural and she would like to have this addressed.  She would like to breathe better through her nose.  She is very afraid of more conventional procedure with the splints.  We discussed potentially doing more of a hybrid procedure endoscopically guided using Relieva Tract balloon for the bony section and endoscopically guided removal of the cartilaginous shift on the right side.  We discussed the small chance  of septal perforation and bleeding as well as risks of general anesthetic.  At this point patient will consider this option but most likely wishes to proceed with it after lengthy discussion.      Dionte Quevedo MD

## 2021-06-23 ENCOUNTER — HOSPITAL ENCOUNTER (OUTPATIENT)
Facility: AMBULATORY SURGERY CENTER | Age: 51
End: 2021-06-23
Attending: OTOLARYNGOLOGY | Admitting: OTOLARYNGOLOGY
Payer: COMMERCIAL

## 2021-06-23 ENCOUNTER — OFFICE VISIT (OUTPATIENT)
Dept: OTOLARYNGOLOGY | Facility: OTHER | Age: 51
End: 2021-06-23
Payer: COMMERCIAL

## 2021-06-23 ENCOUNTER — TELEPHONE (OUTPATIENT)
Dept: OTOLARYNGOLOGY | Facility: OTHER | Age: 51
End: 2021-06-23

## 2021-06-23 ENCOUNTER — PREP FOR PROCEDURE (OUTPATIENT)
Dept: SLEEP MEDICINE | Facility: CLINIC | Age: 51
End: 2021-06-23

## 2021-06-23 VITALS
DIASTOLIC BLOOD PRESSURE: 68 MMHG | HEIGHT: 63 IN | TEMPERATURE: 98.4 F | WEIGHT: 127 LBS | SYSTOLIC BLOOD PRESSURE: 116 MMHG | BODY MASS INDEX: 22.5 KG/M2

## 2021-06-23 DIAGNOSIS — J34.2 DEVIATED SEPTUM: ICD-10-CM

## 2021-06-23 DIAGNOSIS — J34.89 NASAL OBSTRUCTION: Primary | ICD-10-CM

## 2021-06-23 DIAGNOSIS — J34.3 HYPERTROPHY OF BOTH INFERIOR NASAL TURBINATES: ICD-10-CM

## 2021-06-23 DIAGNOSIS — J34.2 DEVIATED NASAL SEPTUM: ICD-10-CM

## 2021-06-23 DIAGNOSIS — J34.2 DEVIATED SEPTUM: Primary | ICD-10-CM

## 2021-06-23 PROCEDURE — 99204 OFFICE O/P NEW MOD 45 MIN: CPT | Performed by: OTOLARYNGOLOGY

## 2021-06-23 ASSESSMENT — MIFFLIN-ST. JEOR: SCORE: 1160.07

## 2021-06-23 NOTE — TELEPHONE ENCOUNTER
Type of surgery: SEPTOPLASTY, NOSE, WITH  submucous resection of turbinates (Bilateral)   CPT 96217, 14824   Nasal obstruction J34.89     Deviated nasal septum J34.2     Hypertrophy of both inferior nasal turbinates J34.3    Location of surgery: [facility]  Date and time of surgery: 8/17  Surgeon:  Pre-Op Appt Date: pt will call [facility]  Post-Op Appt Date: 11/3   Packet sent out: Yes  Pre-cert/Authorization completed:    No prior auth needed, per [employer] online list  Date: 08/20/21    Thank you,   Kelley Wayne   Prior Authorization [facility]  [phone]

## 2021-06-23 NOTE — LETTER
6/23/2021         RE: Shannan Harris  [address]  [facility] [address]        Dear Colleague,    Thank you for referring your patient, Shannan Harris, to the [facility]. Please see a copy of my visit note below.    ENT Consultation    Shannan Harris who is a 51 year old female seen in consultation at the request of self.      History of Present Illness - Shannan Harris is a 51 year old female presents for evaluation of right nasal obstruction.  Patient sustained a nasal trauma in 1995 1996.  Since then slowly started having issues breathing on the right side.  Often feels completely blocked.  Sense of smell and taste appear to be intact.  She denies any seasonal perennial allergies.  Denies any sinus infections.  Does get some postnasal drainage but minimal.  She has not tried any specific nasal sprays.        BP Readings from Last 1 Encounters:   01/12/21 105/71       BP noted to be well controlled today in office.     Shannan IS NOT a smoker/uses chewing tobacco.        Past Medical History -   Past Medical History:   Diagnosis Date     Gastroesophageal reflux disease      Hot flashes        Current Medications -   Current Outpatient Medications:      cyclobenzaprine (FLEXERIL) 10 MG tablet, Take 1 tablet (10 mg) by mouth nightly as needed for muscle spasms, Disp: 10 tablet, Rfl: 0     diclofenac (VOLTAREN) 1 % topical gel, Apply 4 g topically 4 times daily Apply gel to affected area., Disp: 350 g, Rfl: 0     estradiol (VIVELLE-DOT) 0.05 MG/24HR bi-weekly patch, Place 1 patch onto the skin twice a week, Disp: 12 patch, Rfl: 0     famotidine (PEPCID) 40 MG tablet, Take 1 tablet (40 mg) by mouth At Bedtime, Disp: 90 tablet, Rfl: 2     imiquimod (ALDARA) 5 % external cream, Apply once daily to biopsy sites 5 days per week,  for 6 weeks; leave on skin for ~8 hours, then remove with mild soap and water., Disp: 12 packet, Rfl: 3     omeprazole (PRILOSEC) 20 MG DR capsule, Take 1  capsule (20 mg) by mouth daily, Disp: 60 capsule, Rfl: 1     propranolol (INDERAL) 40 MG tablet, Take 1 tablet (40 mg) by mouth daily as needed (stress) (Patient not taking: Reported on 3/11/2021), Disp: 30 tablet, Rfl: 1    Current Facility-Administered Medications:      lidocaine 1% with EPINEPHrine 1:100,000 injection 3 mL, 3 mL, Intradermal, Once, Cuate Ambrocio MD    Allergies -   Allergies   Allergen Reactions     Nkda [No Known Drug Allergies]        Social History -   Social History     Socioeconomic History     Marital status:      Spouse name: Not on file     Number of children: 0     Years of education: Not on file     Highest education level: Not on file   Occupational History     Occupation: research     Employer: [employer]   Social Needs     Financial resource strain: Not on file     Food insecurity     Worry: Not on file     Inability: Not on file     Transportation needs     Medical: Not on file     Non-medical: Not on file   Tobacco Use     Smoking status: Never Smoker     Smokeless tobacco: Never Used   Substance and Sexual Activity     Alcohol use: No     Alcohol/week: 0.0 standard drinks     Drug use: No     Sexual activity: Yes     Partners: Male     Birth control/protection: Surgical     Comment: hyst   Lifestyle     Physical activity     Days per week: Not on file     Minutes per session: Not on file     Stress: Not on file   Relationships     Social connections     Talks on phone: Not on file     Gets together: Not on file     Attends [employer] service: Not on file     Active member of club or organization: Not on file     Attends meetings of clubs or organizations: Not on file     Relationship status: Not on file     Intimate partner violence     Fear of current or ex partner: Not on file     Emotionally abused: Not on file     Physically abused: Not on file     Forced sexual activity: Not on file   Other Topics Concern     Parent/sibling w/ CABG, MI or angioplasty  before 65F 55M? 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    One son, born by surrogacy in [place] in 2014.    Getting  in June 2017.       Family History -   Family History   Problem Relation Age of Onset     Cancer Mother         BCC     Skin Cancer Mother      Cancer Brother         BCC     Brain Cancer Paternal Uncle      Pancreatic Cancer Paternal Uncle      Cancer - colorectal No family hx of      Breast Cancer No family hx of      Melanoma No family hx of        Review of Systems - As per HPI and PMHx, otherwise review of system review of the head and neck negative. Otherwise 10+ review of system is negative    Physical Exam  There were no vitals taken for this visit.  BMI: There is no height or weight on file to calculate BMI.    General - The patient is well nourished and well developed, and appears to have good nutritional status.  Alert and oriented to person and place, answers questions and cooperates with examination appropriately.    SKIN - No suspicious lesions or rashes.  Respiration - No respiratory distress.  Head and Face - Normocephalic and atraumatic, with no gross asymmetry noted of the contour of the facial features.  The facial nerve is intact, with strong symmetric movements.    Voice and Breathing - The patient was breathing comfortably without the use of accessory muscles. The patients voice was clear and strong, and had appropriate pitch and quality.    Ears - Bilateral pinna and EACs with normal appearing overlying skin. Tympanic membrane intact with good mobility on pneumatic otoscopy bilaterally. Bony landmarks of the ossicular chain are normal. The tympanic membranes are normal in appearance. No retraction, perforation, or masses.  No fluid or purulence was seen in the external canal or the middle ear.     Eyes - Extraocular movements intact.  Sclera were not icteric or injected, conjunctiva were pink and moist.    Mouth - Examination of the oral  cavity showed pink, healthy oral mucosa. No lesions or ulcerations noted.  The tongue was mobile and midline, and the dentition were in good condition.      Throat - The walls of the oropharynx were smooth, pink, moist, symmetric, and had no lesions or ulcerations.  The tonsillar pillars and soft palate were symmetric.  The uvula was midline on elevation.    Neck - Normal midline excursion of the laryngotracheal complex during swallowing.  Full range of motion on passive movement.  Palpation of the occipital, submental, submandibular, internal jugular chain, and supraclavicular nodes did not demonstrate any abnormal lymph nodes or masses.  The carotid pulse was palpable bilaterally.  Palpation of the thyroid was soft and smooth, with no nodules or goiter appreciated.  The trachea was mobile and midline.    Nose - External contour is symmetric, no gross deflection or scars.  Nasal mucosa is pink and moist with no abnormal mucus.  The septum was deviated to the right and obstructive, turbinates of enlarged size and position.  No polyps, masses, or purulence noted on examination.  I was able to actually visualize each middle turbinate and middle meatus just with anterior rhinoscopy.  Mostly flexion Is inferiorly.  There appears to be a bony spur more inferiorly to the right and anterior cartilage appears to be straight until about mid way when there is shift to the right.  Neuro - Nonfocal neuro exam is normal, CN 2 through 12 intact, normal gait and muscle tone.      Performed in clinic today:  No procedures preformed in clinic today      A/P - Shannan Harris is a 51 year old female with structural issues that is deviated septum possibly posttraumatic in a large turbinates possibly secondary to that.  We discussed options at this point certainly if it is structural and she would like to have this addressed.  She would like to breathe better through her nose.  She is very afraid of more conventional procedure with  the splints.  We discussed potentially doing more of a hybrid procedure endoscopically guided using Relieva Tract balloon for the bony section and endoscopically guided removal of the cartilaginous shift on the right side.  We discussed the small chance of septal perforation and bleeding as well as risks of general anesthetic.  At this point patient will consider this option but most likely wishes to proceed with it after lengthy discussion.      Dionte Quevedo MD      Again, thank you for allowing me to participate in the care of your patient.        Sincerely,        Dionte Quevedo MD, MD

## 2021-06-29 ENCOUNTER — OFFICE VISIT (OUTPATIENT)
Dept: OBGYN | Facility: CLINIC | Age: 51
End: 2021-06-29
Attending: OBSTETRICS & GYNECOLOGY
Payer: COMMERCIAL

## 2021-06-29 VITALS
HEIGHT: 63 IN | HEART RATE: 73 BPM | SYSTOLIC BLOOD PRESSURE: 121 MMHG | WEIGHT: 131 LBS | BODY MASS INDEX: 23.21 KG/M2 | DIASTOLIC BLOOD PRESSURE: 72 MMHG

## 2021-06-29 DIAGNOSIS — Z12.11 SCREENING FOR COLON CANCER: ICD-10-CM

## 2021-06-29 DIAGNOSIS — N95.1 MENOPAUSAL HOT FLUSHES: Primary | ICD-10-CM

## 2021-06-29 DIAGNOSIS — Z13.220 SCREENING FOR LIPID DISORDERS: ICD-10-CM

## 2021-06-29 DIAGNOSIS — M85.9 LOW BONE DENSITY: ICD-10-CM

## 2021-06-29 DIAGNOSIS — Z13.1 SCREENING FOR DIABETES MELLITUS: ICD-10-CM

## 2021-06-29 DIAGNOSIS — R74.8 ELEVATED LIVER ENZYMES: ICD-10-CM

## 2021-06-29 DIAGNOSIS — Z13.29 SCREENING FOR THYROID DISORDER: ICD-10-CM

## 2021-06-29 LAB
ALBUMIN SERPL-MCNC: 4 G/DL (ref 3.4–5)
ALP SERPL-CCNC: 102 U/L (ref 40–150)
ALT SERPL W P-5'-P-CCNC: 42 U/L (ref 0–50)
AST SERPL W P-5'-P-CCNC: 27 U/L (ref 0–45)
BILIRUB DIRECT SERPL-MCNC: 0.1 MG/DL (ref 0–0.2)
BILIRUB SERPL-MCNC: 0.4 MG/DL (ref 0.2–1.3)
CHOLEST SERPL-MCNC: 290 MG/DL
GLUCOSE SERPL-MCNC: 96 MG/DL (ref 70–99)
HDLC SERPL-MCNC: 82 MG/DL
LDLC SERPL CALC-MCNC: 194 MG/DL
NONHDLC SERPL-MCNC: 208 MG/DL
PROT SERPL-MCNC: 7.3 G/DL (ref 6.8–8.8)
TRIGL SERPL-MCNC: 71 MG/DL
TSH SERPL DL<=0.005 MIU/L-ACNC: 2.06 MU/L (ref 0.4–4)

## 2021-06-29 PROCEDURE — G0463 HOSPITAL OUTPT CLINIC VISIT: HCPCS

## 2021-06-29 PROCEDURE — 80061 LIPID PANEL: CPT | Performed by: OBSTETRICS & GYNECOLOGY

## 2021-06-29 PROCEDURE — 82947 ASSAY GLUCOSE BLOOD QUANT: CPT | Performed by: OBSTETRICS & GYNECOLOGY

## 2021-06-29 PROCEDURE — 99213 OFFICE O/P EST LOW 20 MIN: CPT | Performed by: OBSTETRICS & GYNECOLOGY

## 2021-06-29 PROCEDURE — 84443 ASSAY THYROID STIM HORMONE: CPT | Performed by: OBSTETRICS & GYNECOLOGY

## 2021-06-29 PROCEDURE — 36415 COLL VENOUS BLD VENIPUNCTURE: CPT | Performed by: OBSTETRICS & GYNECOLOGY

## 2021-06-29 PROCEDURE — 80076 HEPATIC FUNCTION PANEL: CPT | Performed by: OBSTETRICS & GYNECOLOGY

## 2021-06-29 RX ORDER — ESTRADIOL 0.04 MG/D
1 PATCH, EXTENDED RELEASE TRANSDERMAL
Qty: 24 PATCH | Refills: 3 | Status: SHIPPED | OUTPATIENT
Start: 2021-07-01 | End: 2022-09-08

## 2021-06-29 ASSESSMENT — ANXIETY QUESTIONNAIRES
7. FEELING AFRAID AS IF SOMETHING AWFUL MIGHT HAPPEN: NOT AT ALL
3. WORRYING TOO MUCH ABOUT DIFFERENT THINGS: NOT AT ALL
GAD7 TOTAL SCORE: 0
2. NOT BEING ABLE TO STOP OR CONTROL WORRYING: NOT AT ALL
5. BEING SO RESTLESS THAT IT IS HARD TO SIT STILL: NOT AT ALL
6. BECOMING EASILY ANNOYED OR IRRITABLE: NOT AT ALL
1. FEELING NERVOUS, ANXIOUS, OR ON EDGE: NOT AT ALL

## 2021-06-29 ASSESSMENT — MIFFLIN-ST. JEOR: SCORE: 1178.18

## 2021-06-29 ASSESSMENT — PATIENT HEALTH QUESTIONNAIRE - PHQ9
SUM OF ALL RESPONSES TO PHQ QUESTIONS 1-9: 0
5. POOR APPETITE OR OVEREATING: NOT AT ALL

## 2021-06-29 NOTE — LETTER
6/29/2021       RE: Shannan Harris  [address]  [facility] [address]     Dear Colleague,    Thank you for referring your patient, Shannan Harris, to the [facility] [place] at [facility]. Please see a copy of my visit note below.      Progress Note    SUBJECTIVE:  Shannan Harris is an 51 year old here for HRT management. Has been on vivelle dot for many years. Has history of THOMAS in past.     Concerns today include: Medication management. Has been on vivelle dot, desires refill. Has been running out, uses ~1 patch every week. Has symptoms when not using. Denies vaginal symptoms. Overall doing well at home. Has 4 year old and 3 step children living with them. Her brother passed away recently and has been working through grief.       Mammogram current: yes    HISTORY:  Prescription Medications as of 6/29/2021       Rx Number Disp Refills Start End Last Dispensed Date Next Fill Date Owning Pharmacy    estradiol (VIVELLE-DOT) 0.0375 MG/24HR BIW patch  24 patch 3 7/1/2021    [facility] #[id number] - [place], [place] - [address] AT [facility]    Sig: Place 1 patch onto the skin twice a week    Class: E-Prescribe    Route: Transdermal    cyclobenzaprine (FLEXERIL) 10 MG tablet  10 tablet 0 1/5/2021    [id number] [place], [place] - [address] AT [facility]    Sig: Take 1 tablet (10 mg) by mouth nightly as needed for muscle spasms    Class: E-Prescribe    Route: Oral    diclofenac (VOLTAREN) 1 % topical gel  350 g 0 1/5/2021    [facility] #[id number] - [place], [place] - [address] AT [facility]    Sig: Apply 4 g topically 4 times daily Apply gel to affected area.    Class: E-Prescribe    Route: Topical    famotidine (PEPCID) 40 MG tablet  90 tablet 2 3/13/2019    [facility] #[id number] - [place], [place] - [address] AT [facility]    Sig: Take 1 tablet (40 mg) by mouth At Bedtime    Class: E-Prescribe    Route: Oral    imiquimod (ALDARA) 5 % external cream  12 packet 3 3/11/2021    [id number] [place] [address] [address] AT [facility]    Sig: Apply once daily to biopsy sites 5 days per week,  for 6 weeks; leave on skin for ~8 hours, then remove with mild soap and water.    Class: E-Prescribe    omeprazole (PRILOSEC) 20 MG DR capsule  60 capsule 1 2021    [id number] [place], [place] - [address] AT [facility]    Sig: Take 1 capsule (20 mg) by mouth daily    Class: E-Prescribe    Route: Oral    propranolol (INDERAL) 40 MG tablet  30 tablet 1 2019    [id number] [place] [address] [address] AT [facility]    Sig: Take 1 tablet (40 mg) by mouth daily as needed (stress)    Class: E-Prescribe    Route: Oral      Clinic-Administered Medications as of 2021       Dose Frequency Start End    lidocaine 1% with EPINEPHrine 1:100,000 injection 3 mL 3 mL ONCE 2020     Route: Intradermal        Allergies   Allergen Reactions     Nkda [No Known Drug Allergies]      Immunization History   Administered Date(s) Administered     Influenza (H1N1) 2009     Influenza (IIV3) PF 10/28/2012, 10/08/2013, 10/01/2014     Influenza Vaccine IM > 6 months Valent IIV4 10/05/2017     Influenza vaccine ages 6-35 months 10/01/2020     TDAP Vaccine (Boostrix) 2015       OB History    Para Term  AB Living   0 0 0 0 0 0   SAB TAB Ectopic Multiple Live Births   0 0 0 0 0     Past Medical History:   Diagnosis Date     Gastroesophageal reflux disease      Hot flashes      Past Surgical History:   Procedure Laterality Date     BIOPSY OF SKIN LESION       HYSTERECTOMY      for uterine myoma and painful periods; has ovaries     Family History   Problem Relation Age of Onset     Cancer Mother          "BCC     Skin Cancer Mother      Cancer Brother         BCC     Brain Cancer Paternal Uncle      Pancreatic Cancer Paternal Uncle      Cancer - colorectal No family hx of      Breast Cancer No family hx of      Melanoma No family hx of      Social History     Socioeconomic History     Marital status:      Spouse name: None     Number of children: 0     Years of education: None     Highest education level: None   Occupational History     Occupation: research     Employer: [employer]   Social Needs     Financial resource strain: None     Food insecurity     Worry: None     Inability: None     Transportation needs     Medical: None     Non-medical: None   Tobacco Use     Smoking status: Never Smoker     Smokeless tobacco: Never Used   Substance and Sexual Activity     Alcohol use: No     Alcohol/week: 0.0 standard drinks     Drug use: No     Sexual activity: Yes     Partners: Male     Birth control/protection: Surgical     Comment: hyst   Lifestyle     Physical activity     Days per week: None     Minutes per session: None     Stress: None   Relationships     Social connections     Talks on phone: None     Gets together: None     Attends Rastafarian service: None     Active member of club or organization: None     Attends meetings of clubs or organizations: None     Relationship status: None     Intimate partner violence     Fear of current or ex partner: None     Emotionally abused: None     Physically abused: None     Forced sexual activity: None   Other Topics Concern     Parent/sibling w/ CABG, MI or angioplasty before 65F 55M? 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    One son, born by surrogacy in [place] in 2014.    Getting  in June 2017.       ROS    EXAM:  Blood pressure 121/72, pulse 73, height 1.6 m (5' 2.99\"), weight 59.4 kg (131 lb), not currently breastfeeding. Body mass index is 23.21 kg/m .  General appearance: Pleasant female in no acute distress. "         ASSESSMENT:  Encounter Diagnoses   Name Primary?     Menopausal hot flushes Yes     Low bone density      Screening for diabetes mellitus      Screening for thyroid disorder      Screening for lipid disorders      Elevated liver enzymes      Screening for colon cancer        PLAN:   Orders Placed This Encounter   Procedures     Lipid Profile     TSH with free T4 reflex     Glucose     Hepatic Panel     GASTROENTEROLOGY ADULT REF PROCEDURE ONLY   DEXA scab    Return in one year/PRN for preventive care or problems/concerns.     Verbalized understanding and agreement with visit plan.    Nanette Blanton MD

## 2021-06-29 NOTE — PROGRESS NOTES
Progress Note    SUBJECTIVE:  Shannan Harris is an 51 year old here for HRT management. Has been on vivelle dot for many years. Has history of THOMAS in past.     Concerns today include: Medication management. Has been on vivelle dot, desires refill. Has been running out, uses ~1 patch every week. Has symptoms when not using. Denies vaginal symptoms. Overall doing well at home. Has 4 year old and 3 step children living with them. Her brother passed away recently and has been working through grief.       Mammogram current: yes    HISTORY:  Prescription Medications as of 6/29/2021       Rx Number Disp Refills Start End Last Dispensed Date Next Fill Date Owning Pharmacy    estradiol (VIVELLE-DOT) 0.0375 MG/24HR BIW patch  24 patch 3 7/1/2021    [address] [place] [address] [place] [address] AT [facility]    Sig: Place 1 patch onto the skin twice a week    Class: E-Prescribe    Route: Transdermal    cyclobenzaprine (FLEXERIL) 10 MG tablet  10 tablet 0 1/5/2021    [address] Minutes [address] [place] [address] [address] AT [facility]    Sig: Take 1 tablet (10 mg) by mouth nightly as needed for muscle spasms    Class: E-Prescribe    Route: Oral    diclofenac (VOLTAREN) 1 % topical gel  350 g 0 1/5/2021    [address] Minutes [address] [place] [address] [place] [address] AT [facility]    Sig: Apply 4 g topically 4 times daily Apply gel to affected area.    Class: E-Prescribe    Route: Topical    famotidine (PEPCID) 40 MG tablet  90 tablet 2 3/13/2019    [address] Minutes [address] [place] [address] [address] AT [facility]    Sig: Take 1 tablet (40 mg) by mouth At Bedtime    Class: E-Prescribe    Route: Oral    imiquimod (ALDARA) 5 % external cream  12 packet 3 3/11/2021    2 Minutes [address] [place] [address] [place] [address] AT [facility]    Sig: Apply once daily to biopsy sites 5 days  per week,  for 6 weeks; leave on skin for ~8 hours, then remove with mild soap and water.    Class: E-Prescribe    omeprazole (PRILOSEC) 20 MG DR capsule  60 capsule 1 2021    [facility] STORE #[id number] [facility] [address] AT [facility]    Sig: Take 1 capsule (20 mg) by mouth daily    Class: E-Prescribe    Route: Oral    propranolol (INDERAL) 40 MG tablet  30 tablet 1 2019    [facility] STORE #[id number] [facility] [address] AT [facility]    Sig: Take 1 tablet (40 mg) by mouth daily as needed (stress)    Class: E-Prescribe    Route: Oral      Clinic-Administered Medications as of 2021       Dose Frequency Start End    lidocaine 1% with EPINEPHrine 1:100,000 injection 3 mL 3 mL ONCE 2020     Route: Intradermal        Allergies   Allergen Reactions     Nkda [No Known Drug Allergies]      Immunization History   Administered Date(s) Administered     Influenza (H1N1) 2009     Influenza (IIV3) PF 10/28/2012, 10/08/2013, 10/01/2014     Influenza Vaccine IM > 6 months Valent IIV4 10/05/2017     Influenza vaccine ages 6-35 months 10/01/2020     TDAP Vaccine (Boostrix) 2015       OB History    Para Term  AB Living   0 0 0 0 0 0   SAB TAB Ectopic Multiple Live Births   0 0 0 0 0     Past Medical History:   Diagnosis Date     Gastroesophageal reflux disease      Hot flashes      Past Surgical History:   Procedure Laterality Date     BIOPSY OF SKIN LESION       HYSTERECTOMY      for uterine myoma and painful periods; has ovaries     Family History   Problem Relation Age of Onset     Cancer Mother         BCC     Skin Cancer Mother      Cancer Brother         BCC     Brain Cancer Paternal Uncle      Pancreatic Cancer Paternal Uncle      Cancer - colorectal No family hx of      Breast Cancer No family hx of      Melanoma No family hx of      Social History     Socioeconomic History     Marital status:       "Spouse name: None     Number of children: 0     Years of education: None     Highest education level: None   Occupational History     Occupation: research     Employer: [employer]   Social Needs     Financial resource strain: None     Food insecurity     Worry: None     Inability: None     Transportation needs     Medical: None     Non-medical: None   Tobacco Use     Smoking status: Never Smoker     Smokeless tobacco: Never Used   Substance and Sexual Activity     Alcohol use: No     Alcohol/week: 0.0 standard drinks     Drug use: No     Sexual activity: Yes     Partners: Male     Birth control/protection: Surgical     Comment: hyst   Lifestyle     Physical activity     Days per week: None     Minutes per session: None     Stress: None   Relationships     Social connections     Talks on phone: None     Gets together: None     Attends Hindu service: None     Active member of club or organization: None     Attends meetings of clubs or organizations: None     Relationship status: None     Intimate partner violence     Fear of current or ex partner: None     Emotionally abused: None     Physically abused: None     Forced sexual activity: None   Other Topics Concern     Parent/sibling w/ CABG, MI or angioplasty before 65F 55M? 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    One son, born by surrogacy in [place] in 2014.    Getting  in June 2017.       ROS    EXAM:  Blood pressure 121/72, pulse 73, height 1.6 m (5' 2.99\"), weight 59.4 kg (131 lb), not currently breastfeeding. Body mass index is 23.21 kg/m .  General appearance: Pleasant female in no acute distress.         ASSESSMENT:  Encounter Diagnoses   Name Primary?     Menopausal hot flushes Yes     Low bone density      Screening for diabetes mellitus      Screening for thyroid disorder      Screening for lipid disorders      Elevated liver enzymes      Screening for colon cancer        PLAN:   Orders Placed This " Encounter   Procedures     Lipid Profile     TSH with free T4 reflex     Glucose     Hepatic Panel     GASTROENTEROLOGY ADULT REF PROCEDURE ONLY   DEXA scab    Return in one year/PRN for preventive care or problems/concerns.     Verbalized understanding and agreement with visit plan.    Nanette Blanton MD

## 2021-06-30 ASSESSMENT — ANXIETY QUESTIONNAIRES: GAD7 TOTAL SCORE: 0

## 2021-07-05 DIAGNOSIS — Z11.59 ENCOUNTER FOR SCREENING FOR OTHER VIRAL DISEASES: ICD-10-CM

## 2021-09-09 ENCOUNTER — OFFICE VISIT (OUTPATIENT)
Dept: DERMATOLOGY | Facility: CLINIC | Age: 51
End: 2021-09-09
Payer: COMMERCIAL

## 2021-09-09 DIAGNOSIS — D22.9 MULTIPLE BENIGN NEVI: ICD-10-CM

## 2021-09-09 DIAGNOSIS — Z87.898 HX OF ATYPICAL NEVUS: ICD-10-CM

## 2021-09-09 DIAGNOSIS — Z85.828 HISTORY OF BASAL CELL CANCER: Primary | ICD-10-CM

## 2021-09-09 DIAGNOSIS — D18.01 CHERRY ANGIOMA: ICD-10-CM

## 2021-09-09 PROCEDURE — 99213 OFFICE O/P EST LOW 20 MIN: CPT | Mod: GC | Performed by: DERMATOLOGY

## 2021-09-09 ASSESSMENT — PAIN SCALES - GENERAL: PAINLEVEL: NO PAIN (0)

## 2021-09-09 NOTE — LETTER
9/9/2021       RE: Shannan Harris  [address]  [facility] [address]     Dear Colleague,    Thank you for referring your patient, Shannan Harris, to the [facility] DERMATOLOGY [facility] [place] at [facility]. Please see a copy of my visit note below.    [facility] Dermatology Note   Office Visit     Encounter Date: Sep 9, 2021    Dermatology Problem List:  FBSE 03/04/21  # Hx of NMSC  - superficial BCC, L ankle s/p bx 03/4/21 treated with Aldara   - superficial BCC, R anterior shin s/p bx 03/04/21 treated with Aldara  - BCC, R medial lower leg, s/p Mohs 1/27/20  - BCC, R medial calf, s/p Mohs 1/27/20  - BCC, L shin medial, s/p ED&C 11/21/19  - BCC, L shin lateral, s/p ED&C 11/21/19  - BCC, L upper chest status post ED&C  - BCC, abdomen  - BCC, R flank  - History: grew up in [place], no history of radiation, no known well-water history use, family with small numbers of BCCs not at the number she gets   # Tinea pedis  # Hx dysplastic nevus  - mod, central upper back s/p exc8/11/2016  # AK, LN2  ___________________________________________    ASSESSMENT/PLAN:    # Recent superficial BCC on the lower legs x2 treated with Aldara  Responded well to treatment with no signs of recurrence. Discussed that Aldara would be a good option going forward for her for new BCC's.     # History of multiple NMSC  - no evidence of recurrence at prior surgical excision sites    # Benign skin findings include seborrheic keratosis, benign nevi, lentigines, cherry angioma  # History of moderately dysplastic nevus s/p bx 2016  - lesions benign nature, no treatment is indicated at this time, will monitor for any clinical changes  - ABCD's of melanoma and signs of possible non-melanoma skin cancer were reviewed with patient    PROCEDURES:  None.     Follow-up: 6 months.     CC Referred Self, MD  No address on file on close of this encounter.    Staff  Involved:  Patient seen and discussed with attending Dr. Ambrocio.     Jacque Bazan MD/MPH  Internal Medicine/Dermatology  PGY-5    I have seen and examined this patient and agree with the assessment and plan as documented in the resident's note.    Cuate Ambrocio MD  Dermatology Attending    ___________________________________________      CC: Skin Check (Shannan is here today for a skin check. She is concerned about a spot on her back.)    HPI:  Ms. Shannan Harris is a(n) 51 year old female (Venkatesh: I) who presents to clinic today as a return patient for evaluation of response to treatment to Aldara for two superficial basal cell carcinomas. She reports that she thinks the treatment worked well and she used them to 2 spots. She hasn't noticed any new growing lesions or any symptomatic lesions. She'd like her back looked at today. Otherwise feeling well.     Labs/Imaging:  Labs reviewed: none    Physical exam:  General: in no acute distress, well-developed, well-nourished  Eyes: conjunctivae clear  Pulmonary: breathing comfortably in no distress  CV: well-perfused, no cyanosis  Extremities: no deformity, no edema  Lymphatic: no lymphadenopathy  Skin: Full skin, which includes the head/face, both arms, chest, back, abdomen,both legs, genitalia and/or groin buttocks, digits and/or nails, was examined.  - skin type: fair   - telangiectatic pink papule: right medial shin, left ankle  - rare waxy stuck-on tan to brown macules and papules: back  - well-defined, symmetric brown pigmented macules and papules with a reassuring pattern on dermoscopy: left back, legs  - diffuse regular brown pigmented macules: shoulders, arms  - surgical scars at sites of prior skin cancers with no evidence of recurrence      Medications:  Current Outpatient Medications   Medication     cyclobenzaprine (FLEXERIL) 10 MG tablet     diclofenac (VOLTAREN) 1 % topical gel     estradiol (VIVELLE-DOT) 0.0375 MG/24HR BIW patch     estradiol  (VIVELLE-DOT) 0.05 MG/24HR bi-weekly patch     famotidine (PEPCID) 40 MG tablet     omeprazole (PRILOSEC) 20 MG DR capsule     imiquimod (ALDARA) 5 % external cream     propranolol (INDERAL) 40 MG tablet     Current Facility-Administered Medications   Medication     lidocaine 1% with EPINEPHrine 1:100,000 injection 3 mL      Past Medical/Surgical History:   Patient Active Problem List   Diagnosis     Multiple melanocytic nevi     SK (seborrheic keratosis)     History of nonmelanoma skin cancer     History of basal cell carcinoma     Hot flashes     Neoplasm of uncertain behavior of skin     Tinea pedis of both feet     Nondisplaced fracture of fifth left metatarsal bone with routine healing     Left foot pain     History of basal cell cancer     Tinea pedis, unspecified laterality     Nasal obstruction     Deviated nasal septum     Nasal valve collapse     Seborrheic keratoses, inflamed     Superficial basal cell carcinoma     Deviated septum     Hypertrophy of both inferior nasal turbinates     Past Medical History:   Diagnosis Date     Gastroesophageal reflux disease      Hot flashes      Past Surgical History:   Procedure Laterality Date     BIOPSY OF SKIN LESION       HYSTERECTOMY  2009

## 2021-09-09 NOTE — PROGRESS NOTES
[facility] Dermatology Note   Office Visit     Encounter Date: Sep 9, 2021    Dermatology Problem List:  FBSE 03/04/21  # Hx of NMSC  - superficial BCC, L ankle s/p bx 03/4/21 treated with Aldara   - superficial BCC, R anterior shin s/p bx 03/04/21 treated with Aldara  - BCC, R medial lower leg, s/p Mohs 1/27/20  - BCC, R medial calf, s/p Mohs 1/27/20  - BCC, L shin medial, s/p ED&C 11/21/19  - BCC, L shin lateral, s/p ED&C 11/21/19  - BCC, L upper chest status post ED&C  - BCC, abdomen  - BCC, R flank  - History: grew up in [place], no history of radiation, no known well-water history use, family with small numbers of BCCs not at the number she gets   # Tinea pedis  # Hx dysplastic nevus  - mod, central upper back s/p exc8/11/2016  # AK, LN2  ___________________________________________    ASSESSMENT/PLAN:    # Recent superficial BCC on the lower legs x2 treated with Aldara  Responded well to treatment with no signs of recurrence. Discussed that Aldara would be a good option going forward for her for new BCC's.     # History of multiple NMSC  - no evidence of recurrence at prior surgical excision sites    # Benign skin findings include seborrheic keratosis, benign nevi, lentigines, cherry angioma  # History of moderately dysplastic nevus s/p bx 2016  - lesions benign nature, no treatment is indicated at this time, will monitor for any clinical changes  - ABCD's of melanoma and signs of possible non-melanoma skin cancer were reviewed with patient    PROCEDURES:  None.     Follow-up: 6 months.     CC Referred Self, MD  No address on file on close of this encounter.    Staff Involved:  Patient seen and discussed with attending Dr. Ambrocio.     Jacque Bazan MD/MPH  Internal Medicine/Dermatology  PGY-5    I have seen and examined this patient and agree with the assessment and plan as documented in the resident's note.    Cuate Ambrocio MD  Dermatology  Attending    ___________________________________________      CC: Skin Check (Shannan is here today for a skin check. She is concerned about a spot on her back.)    HPI:  Ms. Shannan Harris is a(n) 51 year old female (Venkatesh: I) who presents to clinic today as a return patient for evaluation of response to treatment to Aldara for two superficial basal cell carcinomas. She reports that she thinks the treatment worked well and she used them to 2 spots. She hasn't noticed any new growing lesions or any symptomatic lesions. She'd like her back looked at today. Otherwise feeling well.     Labs/Imaging:  Labs reviewed: none    Physical exam:  General: in no acute distress, well-developed, well-nourished  Eyes: conjunctivae clear  Pulmonary: breathing comfortably in no distress  CV: well-perfused, no cyanosis  Extremities: no deformity, no edema  Lymphatic: no lymphadenopathy  Skin: Full skin, which includes the head/face, both arms, chest, back, abdomen,both legs, genitalia and/or groin buttocks, digits and/or nails, was examined.  - skin type: fair   - telangiectatic pink papule: right medial shin, left ankle  - rare waxy stuck-on tan to brown macules and papules: back  - well-defined, symmetric brown pigmented macules and papules with a reassuring pattern on dermoscopy: left back, legs  - diffuse regular brown pigmented macules: shoulders, arms  - surgical scars at sites of prior skin cancers with no evidence of recurrence      Medications:  Current Outpatient Medications   Medication     cyclobenzaprine (FLEXERIL) 10 MG tablet     diclofenac (VOLTAREN) 1 % topical gel     estradiol (VIVELLE-DOT) 0.0375 MG/24HR BIW patch     estradiol (VIVELLE-DOT) 0.05 MG/24HR bi-weekly patch     famotidine (PEPCID) 40 MG tablet     omeprazole (PRILOSEC) 20 MG DR capsule     imiquimod (ALDARA) 5 % external cream     propranolol (INDERAL) 40 MG tablet     Current Facility-Administered Medications   Medication     lidocaine 1% with  EPINEPHrine 1:100,000 injection 3 mL      Past Medical/Surgical History:   Patient Active Problem List   Diagnosis     Multiple melanocytic nevi     SK (seborrheic keratosis)     History of nonmelanoma skin cancer     History of basal cell carcinoma     Hot flashes     Neoplasm of uncertain behavior of skin     Tinea pedis of both feet     Nondisplaced fracture of fifth left metatarsal bone with routine healing     Left foot pain     History of basal cell cancer     Tinea pedis, unspecified laterality     Nasal obstruction     Deviated nasal septum     Nasal valve collapse     Seborrheic keratoses, inflamed     Superficial basal cell carcinoma     Deviated septum     Hypertrophy of both inferior nasal turbinates     Past Medical History:   Diagnosis Date     Gastroesophageal reflux disease      Hot flashes      Past Surgical History:   Procedure Laterality Date     BIOPSY OF SKIN LESION       HYSTERECTOMY  2009

## 2021-09-09 NOTE — NURSING NOTE
Dermatology Rooming Note    Shannan Harris's goals for this visit include:   Chief Complaint   Patient presents with     Skin Check     Shannan is here today for a skin check. She is concerned about a spot on her back.     Brooks Feliz, CMA

## 2021-09-25 ENCOUNTER — HEALTH MAINTENANCE LETTER (OUTPATIENT)
Age: 51
End: 2021-09-25

## 2021-10-12 NOTE — TELEPHONE ENCOUNTER
Patient called  ACS and cancelled 11/19 surgery, she would like to reschedule. Please place a new case request.

## 2021-10-20 ENCOUNTER — PREP FOR PROCEDURE (OUTPATIENT)
Dept: SLEEP MEDICINE | Facility: CLINIC | Age: 51
End: 2021-10-20

## 2021-10-20 DIAGNOSIS — J34.2 DEVIATED SEPTUM: ICD-10-CM

## 2021-10-20 DIAGNOSIS — J34.89 NASAL OBSTRUCTION: Primary | ICD-10-CM

## 2021-10-20 DIAGNOSIS — J34.3 HYPERTROPHY OF BOTH INFERIOR NASAL TURBINATES: ICD-10-CM

## 2021-10-25 ENCOUNTER — HOSPITAL ENCOUNTER (OUTPATIENT)
Facility: AMBULATORY SURGERY CENTER | Age: 51
End: 2021-10-25
Attending: OTOLARYNGOLOGY | Admitting: OTOLARYNGOLOGY
Payer: COMMERCIAL

## 2021-10-25 NOTE — TELEPHONE ENCOUNTER
Surgery rescheduled for 1/18/2022 at [facility]  New packet mailed.  Preop and covid test to be schedule at [facility] per patient.

## 2021-12-21 DIAGNOSIS — Z11.59 ENCOUNTER FOR SCREENING FOR OTHER VIRAL DISEASES: ICD-10-CM

## 2022-05-07 ENCOUNTER — HEALTH MAINTENANCE LETTER (OUTPATIENT)
Age: 52
End: 2022-05-07

## 2022-07-14 ENCOUNTER — OFFICE VISIT (OUTPATIENT)
Dept: DERMATOLOGY | Facility: CLINIC | Age: 52
End: 2022-07-14
Payer: COMMERCIAL

## 2022-07-14 DIAGNOSIS — Z85.828 HISTORY OF BASAL CELL CANCER: ICD-10-CM

## 2022-07-14 DIAGNOSIS — C44.91 SUPERFICIAL BASAL CELL CARCINOMA: ICD-10-CM

## 2022-07-14 DIAGNOSIS — D22.9 MULTIPLE BENIGN NEVI: ICD-10-CM

## 2022-07-14 DIAGNOSIS — B35.3 TINEA PEDIS OF BOTH FEET: Primary | ICD-10-CM

## 2022-07-14 DIAGNOSIS — D48.5 NEOPLASM OF UNCERTAIN BEHAVIOR OF SKIN: ICD-10-CM

## 2022-07-14 PROCEDURE — 99214 OFFICE O/P EST MOD 30 MIN: CPT | Performed by: DERMATOLOGY

## 2022-07-14 RX ORDER — IMIQUIMOD 12.5 MG/.25G
CREAM TOPICAL
Qty: 12 PACKET | Refills: 1 | Status: SHIPPED | OUTPATIENT
Start: 2022-07-14 | End: 2023-03-21

## 2022-07-14 RX ORDER — ECONAZOLE NITRATE 10 MG/G
CREAM TOPICAL 2 TIMES DAILY
Qty: 85 G | Refills: 1 | Status: SHIPPED | OUTPATIENT
Start: 2022-07-14 | End: 2024-02-01

## 2022-07-14 ASSESSMENT — PAIN SCALES - GENERAL: PAINLEVEL: NO PAIN (0)

## 2022-07-14 NOTE — NURSING NOTE
Dermatology Rooming Note    Shannan Harris's goals for this visit include:   Chief Complaint   Patient presents with     Skin Check     Shannan is here for a skin check and has no spots of concern.      Derm Problem     Shannan also wants to discuss a possible fungal infection.     Bar Fernandes, EMT

## 2022-07-14 NOTE — PROGRESS NOTES
[facility] Dermatology Note  Encounter Date: Jul 14, 2022  Office Visit     Dermatology Problem List:  1. NUB  - R proximal anterior thigh: BCC vs BLK  - L distal lateral thigh: BCC vs BLK  - current tx: Aldara 5% cream   2. Hx of NMSC  - superficial BCC, L ankle s/p bx 03/4/21 treated with Aldara   - superficial BCC, R anterior shin s/p bx 03/04/21 treated with Aldara  - BCC, R medial lower leg, s/p Mohs 1/27/20  - BCC, R medial calf, s/p Mohs 1/27/20  - BCC, L shin medial, s/p ED&C 11/21/19  - BCC, L shin lateral, s/p ED&C 11/21/19  - BCC, L upper chest status post ED&C  - BCC, abdomen  - BCC, R flank  - History: grew up in [place], no history of radiation, no known well-water history use, family with small numbers of BCCs not at the number she gets   3. Tinea pedis  - current tx: econazole cream  4. Hx dysplastic nevus  - mod, central upper back s/p exc 8/11/2016  5. AK, LN2    ____________________________________________    Assessment & Plan:    # Neoplasm of uncertain behavior x2, new/complicated  Findings on dermoscopy favor lichenoid keratoses, but cannot entirely exclude superficial BCCs. Patient would like to hold off on biopsies and we agreed that a trial of Aldara cream is appropriate at this point. Patient will continue to monitor for any changes.    - R proximal anterior thigh: BCC vs BLK  - L distal lateral thigh: BCC vs BLK  - Apply imiquimod 5% cream once daily 5 days/wk for up to 6 weeks: spots on R upper thigh, L thigh. Refilled today.     # Tinea vs erosio interdigitalis, bilateral  - Start econazole nitrate 1% cream BID. If this is ineffective, recommend purchasing OTC gentian violet (2%). Warned this may stain.    # History of multiple NMSC  - no evidence of recurrence at prior surgical excision sites  - Reassurance    # Benign skin findings include seborrheic keratosis, benign nevi, lentigines, cherry angioma    # History of moderately dysplastic nevus s/p bx 2016  - lesions  "benign nature, no treatment is indicated at this time, will monitor for any clinical changes  - ABCD's of melanoma and signs of possible non-melanoma skin cancer were reviewed with patient    Procedures Performed:   None    Follow-up: 1 year in-person, or earlier for new or changing lesions    Staff and Medical Student:     Alyssa Vance, MS3  [employer]     I was present with the medical student who participated in the service and in the documentation.  I have verified the history and personally performed the physical exam and medical decision making.  I agree with the assessment and plan of care as documented in the note.      Cuate Ambrocio MD  Dermatology Attending    ____________________________________________    CC: Skin Check (Shannan is here for a skin check and has no spots of concern. ) and Derm Problem (Shannan also wants to discuss a possible fungal infection.)    HPI:  Ms. Shannan Harris is a(n) 52 year old female with a history of multiple NMSC who presents today as a return patient for a skin examination. She was last seen in clinic on 09/09/2021 with a benign FBSE. Most recent superficial BCCs (x2) bx on 03/04/21 were treated effectively with Aldara cream.     Patient is here for a skin check. Has one pink bump on both thighs which are not painful or bleeding, but are similar to her previous basal cell carcinomas on the legs. Denies other concerning lesions. She would also like to discuss long-standing \"athlete's foot\" which has responded temporarily to OTC antifungal creams with recurrence after discontinuation.     She denies history of tanning bed use, no history of blistering sunburns, uses sunscreen and wears hats and long-sleeved clothing when outside, no personal or family history of melanoma.    Patient is otherwise feeling well, without any significant changes to her medical history.    Labs:  None reviewed.    Physical Exam:  Vitals: There were no vitals taken for " this visit.  GEN: This is a well developed, well-nourished female in no acute distress, in a pleasant mood  SKIN: Total skin excluding the undergarment areas was performed. The exam included the head/face, neck, both arms, chest, back, abdomen, both legs, digits and/or nails.   - rare waxy stuck-on tan to brown macules and papules: back  - well-defined, symmetric brown/pink pigmented papules with a reassuring pattern on dermoscopy, likely BLK vs BCC: left distal lateral thigh, right proximal anterior thigh  - diffuse regular brown pigmented macules: shoulders, arms  - surgical scars at sites of prior skin cancers with no evidence of recurrence  - minimal interdigital white, scaly lesions, no nail involvement: bilateral feet  - No other lesions of concern on areas examined.     Medications:  Current Outpatient Medications   Medication     estradiol (VIVELLE-DOT) 0.0375 MG/24HR BIW patch     estradiol (VIVELLE-DOT) 0.05 MG/24HR bi-weekly patch     famotidine (PEPCID) 40 MG tablet     omeprazole (PRILOSEC) 20 MG DR capsule     cyclobenzaprine (FLEXERIL) 10 MG tablet     diclofenac (VOLTAREN) 1 % topical gel     imiquimod (ALDARA) 5 % external cream     propranolol (INDERAL) 40 MG tablet     Current Facility-Administered Medications   Medication     lidocaine 1% with EPINEPHrine 1:100,000 injection 3 mL      Past Medical History:   Patient Active Problem List   Diagnosis     Multiple benign nevi     SK (seborrheic keratosis)     History of nonmelanoma skin cancer     History of basal cell carcinoma     Hot flashes     Neoplasm of uncertain behavior of skin     Tinea pedis of both feet     Nondisplaced fracture of fifth left metatarsal bone with routine healing     Left foot pain     History of basal cell cancer     Tinea pedis, unspecified laterality     Nasal obstruction     Deviated nasal septum     Nasal valve collapse     Seborrheic keratoses, inflamed     Superficial basal cell carcinoma     Deviated septum      Hypertrophy of both inferior nasal turbinates     Past Medical History:   Diagnosis Date     Gastroesophageal reflux disease      Hot flashes         CC Referred Self, MD  No address on file on close of this encounter.

## 2022-07-14 NOTE — PATIENT INSTRUCTIONS
- Apply Aldara cream to 2 spots on legs (R upper thigh, L thigh). Prescribed today.  - Prescribed econazole cream for fungal infection. If no improvement can purchase gentian violet (2%) and apply to area. Warned that it may stain.

## 2022-07-14 NOTE — LETTER
7/14/2022       RE: Shannan Harris  [address]  [facility] [address]     Dear Colleague,    Thank you for referring your patient, Shannan Harris, to the [facility] [place] at [facility]. Please see a copy of my visit note below.    [facility] Dermatology Note  Encounter Date: Jul 14, 2022  Office Visit     Dermatology Problem List:  1. NUB  - R proximal anterior thigh: BCC vs BLK  - L distal lateral thigh: BCC vs BLK  - current tx: Aldara 5% cream   2. Hx of NMSC  - superficial BCC, L ankle s/p bx 03/4/21 treated with Aldara   - superficial BCC, R anterior shin s/p bx 03/04/21 treated with Aldara  - BCC, R medial lower leg, s/p Mohs 1/27/20  - BCC, R medial calf, s/p Mohs 1/27/20  - BCC, L shin medial, s/p ED&C 11/21/19  - BCC, L shin lateral, s/p ED&C 11/21/19  - BCC, L upper chest status post ED&C  - BCC, abdomen  - BCC, R flank  - History: grew up in [place], no history of radiation, no known well-water history use, family with small numbers of BCCs not at the number she gets   3. Tinea pedis  - current tx: econazole cream  4. Hx dysplastic nevus  - mod, central upper back s/p exc 8/11/2016  5. AK, LN2    ____________________________________________    Assessment & Plan:    # Neoplasm of uncertain behavior x2, new/complicated  Findings on dermoscopy favor lichenoid keratoses, but cannot entirely exclude superficial BCCs. Patient would like to hold off on biopsies and we agreed that a trial of Aldara cream is appropriate at this point. Patient will continue to monitor for any changes.    - R proximal anterior thigh: BCC vs BLK  - L distal lateral thigh: BCC vs BLK  - Apply imiquimod 5% cream once daily 5 days/wk for up to 6 weeks: spots on R upper thigh, L thigh. Refilled today.     # Tinea vs erosio interdigitalis, bilateral  - Start econazole nitrate 1% cream BID. If this is ineffective, recommend purchasing  "OTC gentian violet (2%). Warned this may stain.    # History of multiple NMSC  - no evidence of recurrence at prior surgical excision sites  - Reassurance    # Benign skin findings include seborrheic keratosis, benign nevi, lentigines, cherry angioma    # History of moderately dysplastic nevus s/p bx 2016  - lesions benign nature, no treatment is indicated at this time, will monitor for any clinical changes  - ABCD's of melanoma and signs of possible non-melanoma skin cancer were reviewed with patient    Procedures Performed:   None    Follow-up: 1 year in-person, or earlier for new or changing lesions    Staff and Medical Student:     Alyssa Vance, MS3  [employer]     I was present with the medical student who participated in the service and in the documentation.  I have verified the history and personally performed the physical exam and medical decision making.  I agree with the assessment and plan of care as documented in the note.      Cuate Ambrocio MD  Dermatology Attending    ____________________________________________    CC: Skin Check (Shannan is here for a skin check and has no spots of concern. ) and Derm Problem (Shannan also wants to discuss a possible fungal infection.)    HPI:  Ms. Shannan Harris is a(n) 52 year old female with a history of multiple NMSC who presents today as a return patient for a skin examination. She was last seen in clinic on 09/09/2021 with a benign FBSE. Most recent superficial BCCs (x2) bx on 03/04/21 were treated effectively with Aldara cream.     Patient is here for a skin check. Has one pink bump on both thighs which are not painful or bleeding, but are similar to her previous basal cell carcinomas on the legs. Denies other concerning lesions. She would also like to discuss long-standing \"athlete's foot\" which has responded temporarily to OTC antifungal creams with recurrence after discontinuation.     She denies history of tanning bed use, no " history of blistering sunburns, uses sunscreen and wears hats and long-sleeved clothing when outside, no personal or family history of melanoma.    Patient is otherwise feeling well, without any significant changes to her medical history.    Labs:  None reviewed.    Physical Exam:  Vitals: There were no vitals taken for this visit.  GEN: This is a well developed, well-nourished female in no acute distress, in a pleasant mood  SKIN: Total skin excluding the undergarment areas was performed. The exam included the head/face, neck, both arms, chest, back, abdomen, both legs, digits and/or nails.   - rare waxy stuck-on tan to brown macules and papules: back  - well-defined, symmetric brown/pink pigmented papules with a reassuring pattern on dermoscopy, likely BLK vs BCC: left distal lateral thigh, right proximal anterior thigh  - diffuse regular brown pigmented macules: shoulders, arms  - surgical scars at sites of prior skin cancers with no evidence of recurrence  - minimal interdigital white, scaly lesions, no nail involvement: bilateral feet  - No other lesions of concern on areas examined.     Medications:  Current Outpatient Medications   Medication     estradiol (VIVELLE-DOT) 0.0375 MG/24HR BIW patch     estradiol (VIVELLE-DOT) 0.05 MG/24HR bi-weekly patch     famotidine (PEPCID) 40 MG tablet     omeprazole (PRILOSEC) 20 MG DR capsule     cyclobenzaprine (FLEXERIL) 10 MG tablet     diclofenac (VOLTAREN) 1 % topical gel     imiquimod (ALDARA) 5 % external cream     propranolol (INDERAL) 40 MG tablet     Current Facility-Administered Medications   Medication     lidocaine 1% with EPINEPHrine 1:100,000 injection 3 mL      Past Medical History:   Patient Active Problem List   Diagnosis     Multiple benign nevi     SK (seborrheic keratosis)     History of nonmelanoma skin cancer     History of basal cell carcinoma     Hot flashes     Neoplasm of uncertain behavior of skin     Tinea pedis of both feet     Nondisplaced  fracture of fifth left metatarsal bone with routine healing     Left foot pain     History of basal cell cancer     Tinea pedis, unspecified laterality     Nasal obstruction     Deviated nasal septum     Nasal valve collapse     Seborrheic keratoses, inflamed     Superficial basal cell carcinoma     Deviated septum     Hypertrophy of both inferior nasal turbinates     Past Medical History:   Diagnosis Date     Gastroesophageal reflux disease      Hot flashes         CC Referred Self, MD  No address on file on close of this encounter.

## 2022-08-27 ENCOUNTER — HEALTH MAINTENANCE LETTER (OUTPATIENT)
Age: 52
End: 2022-08-27

## 2022-09-08 ENCOUNTER — ANCILLARY PROCEDURE (OUTPATIENT)
Dept: MAMMOGRAPHY | Facility: CLINIC | Age: 52
End: 2022-09-08
Attending: NURSE PRACTITIONER
Payer: COMMERCIAL

## 2022-09-08 DIAGNOSIS — N95.1 MENOPAUSAL HOT FLUSHES: ICD-10-CM

## 2022-09-08 DIAGNOSIS — Z12.31 VISIT FOR SCREENING MAMMOGRAM: ICD-10-CM

## 2022-09-08 PROCEDURE — 77063 BREAST TOMOSYNTHESIS BI: CPT | Mod: GC | Performed by: STUDENT IN AN ORGANIZED HEALTH CARE EDUCATION/TRAINING PROGRAM

## 2022-09-08 PROCEDURE — 77067 SCR MAMMO BI INCL CAD: CPT | Mod: GC | Performed by: STUDENT IN AN ORGANIZED HEALTH CARE EDUCATION/TRAINING PROGRAM

## 2022-09-08 RX ORDER — ESTRADIOL 0.04 MG/D
1 PATCH, EXTENDED RELEASE TRANSDERMAL
Qty: 24 PATCH | Refills: 0 | Status: SHIPPED | OUTPATIENT
Start: 2022-09-08 | End: 2022-11-01

## 2022-11-01 ENCOUNTER — LAB (OUTPATIENT)
Dept: LAB | Facility: CLINIC | Age: 52
End: 2022-11-01
Attending: OBSTETRICS & GYNECOLOGY
Payer: COMMERCIAL

## 2022-11-01 ENCOUNTER — OFFICE VISIT (OUTPATIENT)
Dept: OBGYN | Facility: CLINIC | Age: 52
End: 2022-11-01
Attending: OBSTETRICS & GYNECOLOGY
Payer: COMMERCIAL

## 2022-11-01 VITALS
WEIGHT: 129.8 LBS | HEART RATE: 75 BPM | BODY MASS INDEX: 23 KG/M2 | SYSTOLIC BLOOD PRESSURE: 104 MMHG | DIASTOLIC BLOOD PRESSURE: 64 MMHG | HEIGHT: 63 IN

## 2022-11-01 DIAGNOSIS — N95.1 MENOPAUSAL HOT FLUSHES: ICD-10-CM

## 2022-11-01 DIAGNOSIS — M1A.0410 CHRONIC GOUT OF RIGHT HAND, UNSPECIFIED CAUSE: ICD-10-CM

## 2022-11-01 DIAGNOSIS — Z00.00 VISIT FOR PREVENTIVE HEALTH EXAMINATION: ICD-10-CM

## 2022-11-01 DIAGNOSIS — K21.00 GASTROESOPHAGEAL REFLUX DISEASE WITH ESOPHAGITIS WITHOUT HEMORRHAGE: Primary | ICD-10-CM

## 2022-11-01 DIAGNOSIS — Z13.220 SCREENING FOR LIPID DISORDERS: ICD-10-CM

## 2022-11-01 DIAGNOSIS — K21.9 GASTROESOPHAGEAL REFLUX DISEASE WITHOUT ESOPHAGITIS: ICD-10-CM

## 2022-11-01 LAB
CHOLEST SERPL-MCNC: 304 MG/DL
FASTING STATUS PATIENT QL REPORTED: YES
HDLC SERPL-MCNC: 85 MG/DL
LDLC SERPL CALC-MCNC: 203 MG/DL
NONHDLC SERPL-MCNC: 219 MG/DL
TRIGL SERPL-MCNC: 80 MG/DL
URATE SERPL-MCNC: 4.1 MG/DL (ref 2.6–6)

## 2022-11-01 PROCEDURE — 36415 COLL VENOUS BLD VENIPUNCTURE: CPT

## 2022-11-01 PROCEDURE — 84550 ASSAY OF BLOOD/URIC ACID: CPT

## 2022-11-01 PROCEDURE — 99212 OFFICE O/P EST SF 10 MIN: CPT | Mod: 25 | Performed by: OBSTETRICS & GYNECOLOGY

## 2022-11-01 PROCEDURE — G0463 HOSPITAL OUTPT CLINIC VISIT: HCPCS

## 2022-11-01 PROCEDURE — 99396 PREV VISIT EST AGE 40-64: CPT | Performed by: OBSTETRICS & GYNECOLOGY

## 2022-11-01 PROCEDURE — 80061 LIPID PANEL: CPT

## 2022-11-01 RX ORDER — ESTRADIOL 0.03 MG/D
1 FILM, EXTENDED RELEASE TRANSDERMAL
Qty: 24 PATCH | Refills: 3 | Status: SHIPPED | OUTPATIENT
Start: 2022-11-03 | End: 2023-11-17

## 2022-11-01 RX ORDER — FAMOTIDINE 40 MG/1
40 TABLET, FILM COATED ORAL AT BEDTIME
Qty: 90 TABLET | Refills: 2 | Status: SHIPPED | OUTPATIENT
Start: 2022-11-01 | End: 2023-09-15

## 2022-11-01 NOTE — LETTER
11/1/2022       RE: Shannan Harris  [address]  [facility] [address]     Dear Colleague,    Thank you for referring your patient, Shannan Harris, to the [facility] [place] at [facility]. Please see a copy of my visit note below.        Progress Note    SUBJECTIVE:  Shannan Harris is an 52 year old who requests an Annual Preventive Exam and HRT follow up.       Concerns today include: Decreasing HRT. Is now using patch weekly instead of twice weekly. Manageable but still has some vasomotor symptoms. Has more gastritis symptoms, increased stress due to mom and brother still in [place]. Had Upper endoscopy in 2018 and has taken pepcid in past with good relief.     Did not follow up with primary care regarding elevated lipids. Also has gout symptoms in pinky. Mom also has gout. Asking about testing.     Mammogram current: yes    Last Colonoscopy:  Up to date    HISTORY:  cyclobenzaprine (FLEXERIL) 10 MG tablet, Take 1 tablet (10 mg) by mouth nightly as needed for muscle spasms (Patient not taking: Reported on 7/14/2022)  diclofenac (VOLTAREN) 1 % topical gel, Apply 4 g topically 4 times daily Apply gel to affected area. (Patient not taking: Reported on 7/14/2022)  econazole nitrate 1 % external cream, Apply topically 2 times daily To areas on feet  imiquimod (ALDARA) 5 % external cream, Apply once daily 5 days per week, for up to 6 weeks (to two spots on legs)  imiquimod (ALDARA) 5 % external cream, Apply once daily to biopsy sites 5 days per week,  for 6 weeks; leave on skin for ~8 hours, then remove with mild soap and water. (Patient not taking: No sig reported)  omeprazole (PRILOSEC) 20 MG DR capsule, Take 1 capsule (20 mg) by mouth daily  propranolol (INDERAL) 40 MG tablet, Take 1 tablet (40 mg) by mouth daily as needed (stress) (Patient not taking: No sig reported)    lidocaine 1% with EPINEPHrine 1:100,000 injection 3  "mL      Allergies   Allergen Reactions     Nkda [No Known Drug Allergies]      Immunization History   Administered Date(s) Administered     COVID-19,PF,Kajal 2021     COVID-19,PF,Moderna 2022     Influenza (H1N1) 2009     Influenza (IIV3) PF 10/28/2012, 10/08/2013, 10/01/2014     Influenza Vaccine IM > 6 months Valent IIV4 (Alfuria,Fluzone) 10/05/2017     Influenza vaccine ages 6-35 months 10/01/2020     TDAP Vaccine (Boostrix) 2015     Zoster vaccine recombinant adjuvanted (SHINGRIX) 2022       OB History    Para Term  AB Living   0 0 0 0 0 0   SAB IAB Ectopic Multiple Live Births   0 0 0 0 0     Past Medical History:   Diagnosis Date     Gastroesophageal reflux disease      Hot flashes      Past Surgical History:   Procedure Laterality Date     BIOPSY OF SKIN LESION       HYSTERECTOMY      for uterine myoma and painful periods; has ovaries     Family History   Problem Relation Age of Onset     Cancer Mother         BCC     Skin Cancer Mother      Cancer Brother         BCC     Brain Cancer Paternal Uncle      Pancreatic Cancer Paternal Uncle      Cancer - colorectal No family hx of      Breast Cancer No family hx of      Melanoma No family hx of      Social History     Socioeconomic History     Marital status:      Spouse name: None     Number of children: 0     Years of education: None     Highest education level: None   Occupational History     Occupation: research     Employer: [employer]   Tobacco Use     Smoking status: Never     Smokeless tobacco: Never   Substance and Sexual Activity     Alcohol use: No     Alcohol/week: 0.0 standard drinks     Drug use: No     Sexual activity: Yes     Partners: Male     Birth control/protection: Surgical     Comment: hyst       ROS    PHQ-9 SCORE 2016   PHQ-9 Total Score 0 0     PABLO-7 SCORE 2021   Total Score 0         EXAM:  Blood pressure 104/64, pulse 75, height 1.6 m (5' 2.99\"), " weight 58.9 kg (129 lb 12.8 oz), not currently breastfeeding. Body mass index is 23 kg/m .  General - pleasant female in no acute distress.      ASSESSMENT:  Encounter Diagnoses   Name Primary?     Menopausal hot flushes      Gastroesophageal reflux disease with esophagitis without hemorrhage Yes     Gastroesophageal reflux disease without esophagitis      Screening for lipid disorders      Chronic gout of right hand, unspecified cause      Visit for preventive health examination         PLAN:   Orders Placed This Encounter   Procedures     Lipid Profile     Uric acid     Orders Placed This Encounter   Medications     estradiol (VIVELLE-DOT) 0.025 MG/24HR bi-weekly patch     Sig: Place 1 patch onto the skin twice a week     Dispense:  24 patch     Refill:  3     famotidine (PEPCID) 40 MG tablet     Sig: Take 1 tablet (40 mg) by mouth At Bedtime     Dispense:  90 tablet     Refill:  2   Vivelle dot dose decreased, will eventually wean off  Repeat labs today, encouraged follow up with Dr. Em or Rhoda.   Trial of pepcid, follow up with primary care if not improved.     Return to clinic in one year.  Follow-up as needed.    Nanette Blanton MD

## 2022-11-02 NOTE — PROGRESS NOTES
Progress Note    SUBJECTIVE:  Shannan Harris is an 52 year old who requests an Annual Preventive Exam and HRT follow up.       Concerns today include: Decreasing HRT. Is now using patch weekly instead of twice weekly. Manageable but still has some vasomotor symptoms. Has more gastritis symptoms, increased stress due to mom and brother still in [place]. Had Upper endoscopy in 2018 and has taken pepcid in past with good relief.     Did not follow up with primary care regarding elevated lipids. Also has gout symptoms in pinky. Mom also has gout. Asking about testing.     Mammogram current: yes    Last Colonoscopy:  Up to date    HISTORY:  cyclobenzaprine (FLEXERIL) 10 MG tablet, Take 1 tablet (10 mg) by mouth nightly as needed for muscle spasms (Patient not taking: Reported on 7/14/2022)  diclofenac (VOLTAREN) 1 % topical gel, Apply 4 g topically 4 times daily Apply gel to affected area. (Patient not taking: Reported on 7/14/2022)  econazole nitrate 1 % external cream, Apply topically 2 times daily To areas on feet  imiquimod (ALDARA) 5 % external cream, Apply once daily 5 days per week, for up to 6 weeks (to two spots on legs)  imiquimod (ALDARA) 5 % external cream, Apply once daily to biopsy sites 5 days per week,  for 6 weeks; leave on skin for ~8 hours, then remove with mild soap and water. (Patient not taking: No sig reported)  omeprazole (PRILOSEC) 20 MG DR capsule, Take 1 capsule (20 mg) by mouth daily  propranolol (INDERAL) 40 MG tablet, Take 1 tablet (40 mg) by mouth daily as needed (stress) (Patient not taking: No sig reported)    lidocaine 1% with EPINEPHrine 1:100,000 injection 3 mL      Allergies   Allergen Reactions     Nkda [No Known Drug Allergies]      Immunization History   Administered Date(s) Administered     COVID-19,PF,Kajal 03/05/2021     COVID-19,PF,Moderna 06/02/2022     Influenza (H1N1) 12/21/2009     Influenza (IIV3) PF 10/28/2012, 10/08/2013, 10/01/2014     Influenza Vaccine IM > 6  "months Valent IIV4 (Alfuria,Fluzone) 10/05/2017     Influenza vaccine ages 6-35 months 10/01/2020     TDAP Vaccine (Boostrix) 2015     Zoster vaccine recombinant adjuvanted (SHINGRIX) 2022       OB History    Para Term  AB Living   0 0 0 0 0 0   SAB IAB Ectopic Multiple Live Births   0 0 0 0 0     Past Medical History:   Diagnosis Date     Gastroesophageal reflux disease      Hot flashes      Past Surgical History:   Procedure Laterality Date     BIOPSY OF SKIN LESION       HYSTERECTOMY      for uterine myoma and painful periods; has ovaries     Family History   Problem Relation Age of Onset     Cancer Mother         BCC     Skin Cancer Mother      Cancer Brother         BCC     Brain Cancer Paternal Uncle      Pancreatic Cancer Paternal Uncle      Cancer - colorectal No family hx of      Breast Cancer No family hx of      Melanoma No family hx of      Social History     Socioeconomic History     Marital status:      Spouse name: None     Number of children: 0     Years of education: None     Highest education level: None   Occupational History     Occupation: research     Employer: [employer]   Tobacco Use     Smoking status: Never     Smokeless tobacco: Never   Substance and Sexual Activity     Alcohol use: No     Alcohol/week: 0.0 standard drinks     Drug use: No     Sexual activity: Yes     Partners: Male     Birth control/protection: Surgical     Comment: hyst       ROS    PHQ-9 SCORE 2016   PHQ-9 Total Score 0 0     PABLO-7 SCORE 2021   Total Score 0         EXAM:  Blood pressure 104/64, pulse 75, height 1.6 m (5' 2.99\"), weight 58.9 kg (129 lb 12.8 oz), not currently breastfeeding. Body mass index is 23 kg/m .  General - pleasant female in no acute distress.      ASSESSMENT:  Encounter Diagnoses   Name Primary?     Menopausal hot flushes      Gastroesophageal reflux disease with esophagitis without hemorrhage Yes     Gastroesophageal reflux " disease without esophagitis      Screening for lipid disorders      Chronic gout of right hand, unspecified cause      Visit for preventive health examination         PLAN:   Orders Placed This Encounter   Procedures     Lipid Profile     Uric acid     Orders Placed This Encounter   Medications     estradiol (VIVELLE-DOT) 0.025 MG/24HR bi-weekly patch     Sig: Place 1 patch onto the skin twice a week     Dispense:  24 patch     Refill:  3     famotidine (PEPCID) 40 MG tablet     Sig: Take 1 tablet (40 mg) by mouth At Bedtime     Dispense:  90 tablet     Refill:  2   Vivelle dot dose decreased, will eventually wean off  Repeat labs today, encouraged follow up with Dr. Em or Rhoda.   Trial of pepcid, follow up with primary care if not improved.     Return to clinic in one year.  Follow-up as needed.    Nnaette Blanton MD

## 2022-11-02 NOTE — PATIENT INSTRUCTIONS

## 2022-12-02 ENCOUNTER — LAB (OUTPATIENT)
Dept: LAB | Facility: CLINIC | Age: 52
End: 2022-12-02
Attending: FAMILY MEDICINE
Payer: COMMERCIAL

## 2022-12-02 ENCOUNTER — OFFICE VISIT (OUTPATIENT)
Dept: FAMILY MEDICINE | Facility: CLINIC | Age: 52
End: 2022-12-02
Attending: FAMILY MEDICINE
Payer: COMMERCIAL

## 2022-12-02 VITALS
HEART RATE: 61 BPM | WEIGHT: 132.4 LBS | SYSTOLIC BLOOD PRESSURE: 98 MMHG | HEIGHT: 62 IN | DIASTOLIC BLOOD PRESSURE: 63 MMHG | BODY MASS INDEX: 24.37 KG/M2

## 2022-12-02 DIAGNOSIS — K21.00 GASTROESOPHAGEAL REFLUX DISEASE WITH ESOPHAGITIS WITHOUT HEMORRHAGE: ICD-10-CM

## 2022-12-02 DIAGNOSIS — E78.00 PURE HYPERCHOLESTEROLEMIA: Primary | ICD-10-CM

## 2022-12-02 DIAGNOSIS — E78.00 PURE HYPERCHOLESTEROLEMIA: ICD-10-CM

## 2022-12-02 PROBLEM — Z85.828 HISTORY OF BASAL CELL CANCER: Status: RESOLVED | Noted: 2017-10-11 | Resolved: 2022-12-02

## 2022-12-02 PROBLEM — B35.3 TINEA PEDIS, UNSPECIFIED LATERALITY: Status: RESOLVED | Noted: 2017-10-11 | Resolved: 2022-12-02

## 2022-12-02 PROBLEM — J34.89 NASAL OBSTRUCTION: Status: RESOLVED | Noted: 2018-01-24 | Resolved: 2022-12-02

## 2022-12-02 PROBLEM — K25.9 GASTRIC ULCER WITHOUT HEMORRHAGE OR PERFORATION: Status: ACTIVE | Noted: 2018-11-13

## 2022-12-02 PROBLEM — J34.2 DEVIATED SEPTUM: Status: RESOLVED | Noted: 2021-06-23 | Resolved: 2022-12-02

## 2022-12-02 PROBLEM — J34.829 NASAL VALVE COLLAPSE: Status: RESOLVED | Noted: 2018-01-24 | Resolved: 2022-12-02

## 2022-12-02 LAB
ALBUMIN SERPL-MCNC: 4 G/DL (ref 3.4–5)
ALP SERPL-CCNC: 143 U/L (ref 40–150)
ALT SERPL W P-5'-P-CCNC: 78 U/L (ref 0–50)
AST SERPL W P-5'-P-CCNC: 47 U/L (ref 0–45)
BILIRUB DIRECT SERPL-MCNC: <0.1 MG/DL (ref 0–0.2)
BILIRUB SERPL-MCNC: 0.3 MG/DL (ref 0.2–1.3)
CHOLEST SERPL-MCNC: 302 MG/DL
FASTING STATUS PATIENT QL REPORTED: YES
HDLC SERPL-MCNC: 83 MG/DL
LDLC SERPL CALC-MCNC: 206 MG/DL
NONHDLC SERPL-MCNC: 219 MG/DL
PROT SERPL-MCNC: 7.3 G/DL (ref 6.8–8.8)
TRIGL SERPL-MCNC: 66 MG/DL

## 2022-12-02 PROCEDURE — 99204 OFFICE O/P NEW MOD 45 MIN: CPT | Performed by: FAMILY MEDICINE

## 2022-12-02 PROCEDURE — 36415 COLL VENOUS BLD VENIPUNCTURE: CPT

## 2022-12-02 PROCEDURE — G0463 HOSPITAL OUTPT CLINIC VISIT: HCPCS

## 2022-12-02 PROCEDURE — 82040 ASSAY OF SERUM ALBUMIN: CPT

## 2022-12-02 PROCEDURE — 80061 LIPID PANEL: CPT

## 2022-12-02 RX ORDER — ATORVASTATIN CALCIUM 10 MG/1
10 TABLET, FILM COATED ORAL DAILY
Qty: 90 TABLET | Refills: 1 | Status: SHIPPED | OUTPATIENT
Start: 2022-12-02

## 2022-12-02 NOTE — NURSING NOTE
Chief Complaint   Patient presents with     Establish Care     Establish care and high cholesterol

## 2022-12-02 NOTE — PROGRESS NOTES
CC: Establish Care (Establish care and high cholesterol)      ASSESSMENT/PLAN:   Shannan was seen today for establish care.    Pure hypercholesterolemia  Shannan states she wasn't fully fasting for last labs, so will recheck today. Will also check a new baseline liver function panel as well. She has had intermittently elevated LFTs, but most recent LFTs normal. Discussed starting low dose of atorvastatin 10 mg daily and rechecking lipid panel and liver function panel in 6 weeks. I discussed with the patient the risks, benefits, and alternatives to taking this medication as well as common side effects.   -     Lipid Panel; Standing  -     Hepatic Panel; Standing  -     atorvastatin (LIPITOR) 10 MG tablet; Take 1 tablet (10 mg) by mouth daily    Gastroesophageal reflux disease with esophagitis without hemorrhage  The current medical regimen is effective;  continue present plan and medications - using famotidine daily and omeprazole only as needed.   -     omeprazole (PRILOSEC) 20 MG DR capsule; Take 1 capsule (20 mg) by mouth daily as needed (heartburn)      Worrisome signs and symptoms were discussed with Shannan and she was instructed to return to the clinic for concerning symptoms or to call with questions.  Return in about 6 weeks (around 1/13/2023) for lab visit.    Ginna Duong MD  Family Medicine      SUBJECTIVE: Shannan is a 52 year old female who comes in with the following concerns:    Hyperlipidemia. Long-standing diagnosis.   - Most recent surveillance labs reviewed today and are notable for  (11/2022). Previously,  (6/2021). Liver function panel was normal (6/2021) but previously has had elevated liver function tests. She states her mom also has elevated LFTs, Shannan denies significant alcohol or Tylenol use.   - The 10-year ASCVD risk score (Tyree COFFEY, et al., 2019) is: 0.9%    Values used to calculate the score:      Age: 52 years      Sex: Female      Is Non- : No       "Diabetic: No      Tobacco smoker: No      Systolic Blood Pressure: 98 mmHg      Is BP treated: No      HDL Cholesterol: 85 mg/dL      Total Cholesterol: 304 mg/dL   - FH of HLD - in her mother.   - FH of early CAD - in her father at in his 40s.   - No personal history of ASCVD.  - Treating with lifestyle management.    - Exercises regularly, yoga, biking, swimming.   - Nutrition is varied & balanced.   - Nonsmoker. Very occasional alcohol use.     GERD.   - With esophagitis on EGD 11/2018.   - Takes famotidine daily, but sometimes still has sx, then will need to take omeprazole. Requesting omeprazole refill today.        OBJECTIVE:   BP 98/63   Pulse 61   Ht 1.575 m (5' 2\")   Wt 60.1 kg (132 lb 6.4 oz)   BMI 24.22 kg/m    General: Alert and oriented in no acute distress.  Cardio: RRR, normal S1 and S2, without murmurs, rubs, or gallops appreciated.    Resp: CTA bilaterally. Normal respiratory effort.   GI: Soft, NT, ND.  No masses or hepatosplenomegaly appreciated.    MSK: Distal pulses 2+ and symmetric, extremities without deformity, edema.  Psych: Mood and affect appropriate.      Component      Latest Ref Rng & Units 6/29/2021 11/1/2022   Cholesterol      <200 mg/dL 290 (H) 304 (H)   Triglycerides      <150 mg/dL 71 80   HDL Cholesterol      >=50 mg/dL 82 85   LDL Cholesterol Calculated      <=100 mg/dL 194 (H) 203 (H)   Non HDL Cholesterol      <130 mg/dL 208 (H) 219 (H)         "

## 2023-01-07 ENCOUNTER — HEALTH MAINTENANCE LETTER (OUTPATIENT)
Age: 53
End: 2023-01-07

## 2023-03-16 ENCOUNTER — OFFICE VISIT (OUTPATIENT)
Dept: DERMATOLOGY | Facility: CLINIC | Age: 53
End: 2023-03-16
Payer: COMMERCIAL

## 2023-03-16 DIAGNOSIS — D48.5 NEOPLASM OF UNCERTAIN BEHAVIOR OF SKIN: Primary | ICD-10-CM

## 2023-03-16 DIAGNOSIS — L57.8 ACTINIC SKIN DAMAGE: ICD-10-CM

## 2023-03-16 DIAGNOSIS — Z85.828 HISTORY OF BASAL CELL CARCINOMA: ICD-10-CM

## 2023-03-16 PROCEDURE — 88305 TISSUE EXAM BY PATHOLOGIST: CPT | Mod: TC | Performed by: STUDENT IN AN ORGANIZED HEALTH CARE EDUCATION/TRAINING PROGRAM

## 2023-03-16 PROCEDURE — 11102 TANGNTL BX SKIN SINGLE LES: CPT | Performed by: STUDENT IN AN ORGANIZED HEALTH CARE EDUCATION/TRAINING PROGRAM

## 2023-03-16 PROCEDURE — 88305 TISSUE EXAM BY PATHOLOGIST: CPT | Mod: 26 | Performed by: DERMATOLOGY

## 2023-03-16 PROCEDURE — 99213 OFFICE O/P EST LOW 20 MIN: CPT | Mod: 25 | Performed by: STUDENT IN AN ORGANIZED HEALTH CARE EDUCATION/TRAINING PROGRAM

## 2023-03-16 PROCEDURE — 11103 TANGNTL BX SKIN EA SEP/ADDL: CPT | Performed by: STUDENT IN AN ORGANIZED HEALTH CARE EDUCATION/TRAINING PROGRAM

## 2023-03-16 NOTE — NURSING NOTE
Dermatology Rooming Note    Shannan Harris's goals for this visit include:   Chief Complaint   Patient presents with     Skin Check     Spot check for areas of concern on right leg.      Smith Conrad, EMT-B

## 2023-03-16 NOTE — PROGRESS NOTES
[facility] Dermatology Note    Encounter Date: Mar 16, 2023    Dermatology Problem List:  . NUB  - R proximal anterior thigh: BCC vs BLK  - L distal lateral thigh: BCC vs BLK  - current tx: Aldara 5% cream   2. Hx of NMSC  - superficial BCC, L ankle s/p bx 03/4/21 treated with Aldara   - superficial BCC, R anterior shin s/p bx 03/04/21 treated with Aldara  - BCC, R medial lower leg, s/p Mohs 1/27/20  - BCC, R medial calf, s/p Mohs 1/27/20  - BCC, L shin medial, s/p ED&C 11/21/19  - BCC, L shin lateral, s/p ED&C 11/21/19  - BCC, L upper chest status post ED&C  - BCC, abdomen  - BCC, R flank  - History: grew up in [place], no history of radiation, no known well-water history use, family with small numbers of BCCs not at the number she gets   3. Tinea pedis  - current tx: econazole cream  4. Hx dysplastic nevus  - mod, central upper back s/p exc 8/11/2016  5. AK, LN2       Major PMHx  -   ______________________________________    Impression/Plan:  Shannan was seen today for skin check.    Diagnoses and all orders for this visit:    Neoplasm of uncertain behavior of skin  -     Dermatological Path Order and Indications; Standing  -     Dermatological Path Order and Indications  - shave bx R thigh, RLL, L thigh ddx smBCC vs other  - discussed use of imiquimod if these were to return positive per pt preference. Discussed 80% cure rate at 5 years    History of basal cell carcinoma  - No obvious evidence of recurrence at site of previous malignancy  - discussed sunprotective behaviors, clothing, and the use of sunscreen    Actinic skin damage  - discussed sunprotective behaviors, clothing, and the use of sunscreen        - SHAVE BIOPSY PROCEDURE NOTE: After written informed consent was obtained, a time out was taken to identify the patient and the correct site for biopsy. The lesion on the R thigh, RLLeg and L thigh was cleansed with a 70% isopropyl alcohol wipe, and then injected with 3cc of lidocaine 1%  with epinephrine 1:100,000. Once anesthesia was ensured, the visible surface of the lesion was biopsied using a Melissa blade in standard technique. Hemostasis was obtained with pressure and aluminum chloride 20% solution. The specimen was placed in a labeled formalin container and sent to pathology for sectioning and analysis. The wound was dressed with white petrolatum and an adhesive bandage. The patient tolerated the procedure well. Post-procedure instructions and recommendations were provided both verbally and in writing.    Follow-up in 6 mo.       Staff Involved:  Staff Only    Moses Garcia MD   of Dermatology  Department of Dermatology  [facility]      CC:   Chief Complaint   Patient presents with     Skin Check     Spot check for areas of concern on right leg.        History of Present Illness:  Ms. Shannan Harris is a 53 year old female who presents as a return patient.    Patient presents with a history of multiple basal cells.  She has noticed some new pink macules and papules which have been growing over the last several months.  She is concerned that these could be possible skin cancer.  If they are she is interested in immunotherapy with imiquimod    Labs:      Physical exam:  Vitals: There were no vitals taken for this visit.  GEN: well developed, well-nourished, in no acute distress, in a pleasant mood.     SKIN: Salcedo phototype 1  - Focused examination of the face and legs was performed.  - Flat brown macules and patches in a sun exposed areas on face and extremities  - thin pink telangiectatic macules R thigh, L thigh and R lower leg   - No other lesions of concern on areas examined.     Past Medical History:   Past Medical History:   Diagnosis Date     Gastroesophageal reflux disease      History of basal cell carcinoma      Hot flashes      Nondisplaced fracture of fifth left metatarsal bone with routine healing 09/12/2016     Pure  hypercholesterolemia 12/2/2022     Past Surgical History:   Procedure Laterality Date     BIOPSY OF SKIN LESION       HYSTERECTOMY  2009    for uterine myoma and painful periods; has ovaries       Social History:   reports that she has never smoked. She has never used smokeless tobacco. She reports that she does not drink alcohol and does not use drugs.    Family History:  Family History   Problem Relation Age of Onset     Skin Cancer Mother         BCC     Hypertension Mother      Hyperlipidemia Mother      Gout Mother      Heart Disease Father      Cancer Brother         BCC     Brain Cancer Paternal Uncle      Pancreatic Cancer Paternal Uncle      Cancer - colorectal No family hx of      Breast Cancer No family hx of      Melanoma No family hx of        Medications:  Current Outpatient Medications   Medication Sig Dispense Refill     atorvastatin (LIPITOR) 10 MG tablet Take 1 tablet (10 mg) by mouth daily 90 tablet 1     diclofenac (VOLTAREN) 1 % topical gel Apply 4 g topically 4 times daily Apply gel to affected area. (Patient not taking: Reported on 7/14/2022) 350 g 0     econazole nitrate 1 % external cream Apply topically 2 times daily To areas on feet 85 g 1     estradiol (VIVELLE-DOT) 0.025 MG/24HR bi-weekly patch Place 1 patch onto the skin twice a week 24 patch 3     famotidine (PEPCID) 40 MG tablet Take 1 tablet (40 mg) by mouth At Bedtime 90 tablet 2     imiquimod (ALDARA) 5 % external cream Apply once daily 5 days per week, for up to 6 weeks (to two spots on legs) (Patient not taking: Reported on 12/2/2022) 12 packet 1     omeprazole (PRILOSEC) 20 MG DR capsule Take 1 capsule (20 mg) by mouth daily as needed (heartburn) 90 capsule 1     Allergies   Allergen Reactions     Nkda [No Known Drug Allergies]

## 2023-03-16 NOTE — LETTER
3/16/2023       RE: Shannan Harris  [address]  [facility] [address]     Dear Colleague,    Thank you for referring your patient, Shannan Harris, to the [facility] [place] at [facility]. Please see a copy of my visit note below.    [facility] Dermatology Note    Encounter Date: Mar 16, 2023    Dermatology Problem List:  . NUB  - R proximal anterior thigh: BCC vs BLK  - L distal lateral thigh: BCC vs BLK  - current tx: Aldara 5% cream   2. Hx of NMSC  - superficial BCC, L ankle s/p bx 03/4/21 treated with Aldara   - superficial BCC, R anterior shin s/p bx 03/04/21 treated with Aldara  - BCC, R medial lower leg, s/p Mohs 1/27/20  - BCC, R medial calf, s/p Mohs 1/27/20  - BCC, L shin medial, s/p ED&C 11/21/19  - BCC, L shin lateral, s/p ED&C 11/21/19  - BCC, L upper chest status post ED&C  - BCC, abdomen  - BCC, R flank  - History: grew up in [place], no history of radiation, no known well-water history use, family with small numbers of BCCs not at the number she gets   3. Tinea pedis  - current tx: econazole cream  4. Hx dysplastic nevus  - mod, central upper back s/p exc 8/11/2016  5. AK, LN2       Major PMHx  -   ______________________________________    Impression/Plan:  Shannan was seen today for skin check.    Diagnoses and all orders for this visit:    Neoplasm of uncertain behavior of skin  -     Dermatological Path Order and Indications; Standing  -     Dermatological Path Order and Indications  - shave bx R thigh, RLL, L thigh ddx smBCC vs other  - discussed use of imiquimod if these were to return positive per pt preference. Discussed 80% cure rate at 5 years    History of basal cell carcinoma  - No obvious evidence of recurrence at site of previous malignancy  - discussed sunprotective behaviors, clothing, and the use of sunscreen    Actinic skin damage  - discussed sunprotective behaviors, clothing, and the  use of sunscreen        - SHAVE BIOPSY PROCEDURE NOTE: After written informed consent was obtained, a time out was taken to identify the patient and the correct site for biopsy. The lesion on the R thigh, RLLeg and L thigh was cleansed with a 70% isopropyl alcohol wipe, and then injected with 3cc of lidocaine 1% with epinephrine 1:100,000. Once anesthesia was ensured, the visible surface of the lesion was biopsied using a Melissa blade in standard technique. Hemostasis was obtained with pressure and aluminum chloride 20% solution. The specimen was placed in a labeled formalin container and sent to pathology for sectioning and analysis. The wound was dressed with white petrolatum and an adhesive bandage. The patient tolerated the procedure well. Post-procedure instructions and recommendations were provided both verbally and in writing.    Follow-up in 6 mo.       Staff Involved:  Staff Only    Moses Garcia MD   of Dermatology  Department of Dermatology  [facility]      CC:   Chief Complaint   Patient presents with     Skin Check     Spot check for areas of concern on right leg.        History of Present Illness:  Ms. Shannan Harris is a 53 year old female who presents as a return patient.    Patient presents with a history of multiple basal cells.  She has noticed some new pink macules and papules which have been growing over the last several months.  She is concerned that these could be possible skin cancer.  If they are she is interested in immunotherapy with imiquimod    Labs:      Physical exam:  Vitals: There were no vitals taken for this visit.  GEN: well developed, well-nourished, in no acute distress, in a pleasant mood.     SKIN: Salcedo phototype 1  - Focused examination of the face and legs was performed.  - Flat brown macules and patches in a sun exposed areas on face and extremities  - thin pink telangiectatic macules R thigh, L thigh and R  lower leg   - No other lesions of concern on areas examined.     Past Medical History:   Past Medical History:   Diagnosis Date     Gastroesophageal reflux disease      History of basal cell carcinoma      Hot flashes      Nondisplaced fracture of fifth left metatarsal bone with routine healing 09/12/2016     Pure hypercholesterolemia 12/2/2022     Past Surgical History:   Procedure Laterality Date     BIOPSY OF SKIN LESION       HYSTERECTOMY  2009    for uterine myoma and painful periods; has ovaries       Social History:   reports that she has never smoked. She has never used smokeless tobacco. She reports that she does not drink alcohol and does not use drugs.    Family History:  Family History   Problem Relation Age of Onset     Skin Cancer Mother         BCC     Hypertension Mother      Hyperlipidemia Mother      Gout Mother      Heart Disease Father      Cancer Brother         BCC     Brain Cancer Paternal Uncle      Pancreatic Cancer Paternal Uncle      Cancer - colorectal No family hx of      Breast Cancer No family hx of      Melanoma No family hx of        Medications:  Current Outpatient Medications   Medication Sig Dispense Refill     atorvastatin (LIPITOR) 10 MG tablet Take 1 tablet (10 mg) by mouth daily 90 tablet 1     diclofenac (VOLTAREN) 1 % topical gel Apply 4 g topically 4 times daily Apply gel to affected area. (Patient not taking: Reported on 7/14/2022) 350 g 0     econazole nitrate 1 % external cream Apply topically 2 times daily To areas on feet 85 g 1     estradiol (VIVELLE-DOT) 0.025 MG/24HR bi-weekly patch Place 1 patch onto the skin twice a week 24 patch 3     famotidine (PEPCID) 40 MG tablet Take 1 tablet (40 mg) by mouth At Bedtime 90 tablet 2     imiquimod (ALDARA) 5 % external cream Apply once daily 5 days per week, for up to 6 weeks (to two spots on legs) (Patient not taking: Reported on 12/2/2022) 12 packet 1     omeprazole (PRILOSEC) 20 MG DR capsule Take 1 capsule (20 mg) by mouth  daily as needed (heartburn) 90 capsule 1     Allergies   Allergen Reactions     Nkda [No Known Drug Allergies]        Lidocaine-epinephrine 1-1:419919 % injection   4.5 mL once for one use, starting 3/16/2023 ending 3/16/2023,  2mL disp, R-0, injection  Injected by Dr. Garcia

## 2023-03-16 NOTE — PROGRESS NOTES
Lidocaine-epinephrine 1-1:755581 % injection   4.5 mL once for one use, starting 3/16/2023 ending 3/16/2023,  2mL disp, R-0, injection  Injected by Dr. Garcia

## 2023-03-16 NOTE — PATIENT INSTRUCTIONS

## 2023-03-20 LAB
PATH REPORT.COMMENTS IMP SPEC: ABNORMAL
PATH REPORT.COMMENTS IMP SPEC: ABNORMAL
PATH REPORT.COMMENTS IMP SPEC: YES
PATH REPORT.FINAL DX SPEC: ABNORMAL
PATH REPORT.GROSS SPEC: ABNORMAL
PATH REPORT.MICROSCOPIC SPEC OTHER STN: ABNORMAL
PATH REPORT.RELEVANT HX SPEC: ABNORMAL

## 2023-03-21 DIAGNOSIS — C44.91 SUPERFICIAL BASAL CELL CARCINOMA: ICD-10-CM

## 2023-03-21 RX ORDER — IMIQUIMOD 12.5 MG/.25G
CREAM TOPICAL
Qty: 24 PACKET | Refills: 4 | Status: SHIPPED | OUTPATIENT
Start: 2023-03-21 | End: 2024-02-01

## 2023-06-14 DIAGNOSIS — K21.00 GASTROESOPHAGEAL REFLUX DISEASE WITH ESOPHAGITIS WITHOUT HEMORRHAGE: ICD-10-CM

## 2023-07-11 ENCOUNTER — OFFICE VISIT (OUTPATIENT)
Dept: DERMATOLOGY | Facility: CLINIC | Age: 53
End: 2023-07-11
Payer: COMMERCIAL

## 2023-07-11 DIAGNOSIS — D22.9 MULTIPLE MELANOCYTIC NEVI: ICD-10-CM

## 2023-07-11 DIAGNOSIS — C44.91 SUPERFICIAL BASAL CELL CARCINOMA: Primary | ICD-10-CM

## 2023-07-11 PROCEDURE — 99213 OFFICE O/P EST LOW 20 MIN: CPT | Mod: GC | Performed by: DERMATOLOGY

## 2023-07-11 ASSESSMENT — PAIN SCALES - GENERAL: PAINLEVEL: NO PAIN (0)

## 2023-07-11 NOTE — LETTER
7/11/2023       RE: Shannan Harris  [address]  [facility] [address]     Dear Colleague,    Thank you for referring your patient, Shannan Harris, to the [facility] [place] at [facility]. Please see a copy of my visit note below.    [facility] Dermatology Note  Encounter Date: Jul 11, 2023  Office Visit     Dermatology Problem List:  1. LTM  - left upper cutaneous lip, 1mm pearly papule  2. Hx of NMSC  - sfBCC, R proximal anterior thigh s/p imiquimod 3/16/23  - sfBCC, L distal lateral thigh s/p imiquimod 3/16/23  - sfBCC, Right thigh s/p imiquimod 3/16/23  - superficial BCC, L ankle s/p bx 03/4/21 treated with Aldara   - superficial BCC, R anterior shin s/p bx 03/04/21 treated with Aldara  - BCC, R medial lower leg, s/p Mohs 1/27/20  - BCC, R medial calf, s/p Mohs 1/27/20  - BCC, L shin medial, s/p ED&C 11/21/19  - BCC, L shin lateral, s/p ED&C 11/21/19  - BCC, L upper chest status post ED&C  - BCC, abdomen  - BCC, R flank  - History: grew up in [place], no history of radiation, no known well-water history use, family with small numbers of BCCs not at the number she gets   3. Tinea pedis  - current tx: econazole cream  4. Hx dysplastic nevus  - mod, central upper back s/p exc 8/11/2016  5. AK, LN2    ____________________________________________    Assessment & Plan:   # Hx of multiple BCC  Numerous low risk BCC primarily on the lower extremities, no clear risk factors and no other stigmata of underlying BCC genodermatosis. Has been treating topically with imiquimod for superficial BCC without evidence of recurrence. Sun protection discussed. Patient prefers for judicious use of biopsy with chemo topicals preferred over surgery unless absolutely necessary.    # Lesion to monitor  - left upper cutaneous lip, 1mm pearly papule, possible early BCC; we discussed biopsy vs careful clinical followup, patient preferred the  latter, understands to RTC if significant change in size or apperance  - photodocumentation    # Benign skin findings  - Physical exam demonstrating benign skin findings as below.  - Cherry hemangiomas  - Benign nevi  - Reassurance provided.  - ABCDEs of melanoma handout provided.  - Advised patient to return to clinic for any new or concerning lesions.    Procedures Performed:   None    Follow-up: 6 months skin check and spot check, prn for new or changing lesions    Staff and Resident:     Jordin Clifton MD  PGY-4 Dermatology  Pager: [phone]    I have seen and examined this patient and agree with the assessment and plan as documented in the resident's note.    Cuate Ambrocio MD  Dermatology Attending    ____________________________________________    CC: Skin Check (FBSC - patient has no specific spots of concern. Patient has a hx of BC that were removed on her legs. )    HPI:  Ms. Shannan Harris is a(n) 53 year old female who presents today as a return patient for skin check.    Did imiquimod as instructed, no lesions of concern  - Patient is otherwise feeling well, without additional skin concerns.    Labs Reviewed:  N/A    Physical Exam:  Vitals: There were no vitals taken for this visit.  SKIN: Total skin excluding the undergarment areas was performed. The exam included the head/face, neck, both arms, chest, back, abdomen, both legs, digits and/or nails.   - 1mm pearly papule on left upper cutaneous lip with arborizing vessel  - Dome shaped bright red papules on the trunk and extremities   - Scattered brown macules on sun exposed areas.  - Light to dark brown symmetric macules and papules with normal pigment networks on dermoscopy on trunk extremities  - Prior areas of skin cancer examined and no evidence of recurrence based on inspection or palpation  - No other lesions of concern on areas examined.     Medications:  Current Outpatient Medications   Medication    atorvastatin (LIPITOR) 10 MG tablet     econazole nitrate 1 % external cream    estradiol (VIVELLE-DOT) 0.025 MG/24HR bi-weekly patch    famotidine (PEPCID) 40 MG tablet    imiquimod (ALDARA) 5 % external cream    omeprazole (PRILOSEC) 20 MG DR capsule    diclofenac (VOLTAREN) 1 % topical gel     Current Facility-Administered Medications   Medication    lidocaine 1% with EPINEPHrine 1:100,000 injection 3 mL      Past Medical History:   Patient Active Problem List   Diagnosis    Multiple benign nevi    Hot flashes    Neoplasm of uncertain behavior of skin    Tinea pedis of both feet    Deviated nasal septum    Seborrheic keratoses, inflamed    Superficial basal cell carcinoma    Hypertrophy of both inferior nasal turbinates    Gastric ulcer without hemorrhage or perforation    Pure hypercholesterolemia     Past Medical History:   Diagnosis Date    Gastroesophageal reflux disease     History of basal cell carcinoma     Hot flashes     Nondisplaced fracture of fifth left metatarsal bone with routine healing 09/12/2016    Pure hypercholesterolemia 12/2/2022       CC Referred Self, MD  No address on file on close of this encounter.

## 2023-07-11 NOTE — PROGRESS NOTES
[facility] Dermatology Note  Encounter Date: Jul 11, 2023  Office Visit     Dermatology Problem List:      ____________________________________________    Assessment & Plan:   # ***  {kkplans:343293}   - ***     # ***   {kkplans:308654}   - ***     Procedures Performed:   - Shave biopsy procedure note, location(s): ***. After discussion of benefits and risks including but not limited to bleeding, infection, scar, incomplete removal, recurrence, and non-diagnostic biopsy, written consent and photographs were obtained. The area was cleaned with isopropyl alcohol. 0.5mL of 1% lidocaine with epinephrine was injected to obtain adequate anesthesia of lesion(s). Shave biopsy at site(s) performed. Hemostasis was achieved with aluminium chloride. Petrolatum ointment and a sterile dressing were applied. The patient tolerated the procedure and no complications were noted. The patient was provided with verbal and written post care instructions.     - Cryotherapy procedure note, location(s): ***. After verbal consent and discussion of risks and benefits including, but not limited to, dyspigmentation/scar, blister, and pain, *** lesion(s) was(were) treated with 1-2 mm freeze border for 1-2 cycles with liquid nitrogen. Post cryotherapy instructions were provided.    {kkprocedurenotes:753568}    None    Follow-up: ***, prn for new or changing lesions    Staff and Resident: ***    Jordin Clifton MD  PGY-4 Dermatology  Pager: [phone]    ____________________________________________    CC: Skin Check (FBSC - patient has no specific spots of concern. Patient has a hx of BC that were removed on her legs. )    HPI:  Ms. Shannan Harris is a(n) 53 year old female who presents today {kknew/return:349029} for ***    - Patient is otherwise feeling well, without additional skin concerns.    Labs Reviewed:  ***N/A    Physical Exam:  Vitals: There were no vitals taken for this visit.  SKIN: {kkSkAlleghany Health:908411}  - ***  -  Erythematous papule with overyling adherent scale on the ***.  - Waxy stuck on tan to brown papules on the ***.   - Dome shaped bright red papules on the ***.   - There are fine lines and dyspigmentation on sun exposed areas of the face and chest.  - Scattered brown macules on sun exposed areas.  - Light to dark brown symmetric macules and papules with normal pigment networks on dermoscopy ***  - Superficial acneiform papules with intermixed open and closed comedones on the ***.   - Firm tan/flesh colored papule that dimples with lateral pressure on the ***.  - Prior areas of skin cancer examined and no evidence of recurrence based on inspection or palpation  - No other lesions of concern on areas examined.     Medications:  Current Outpatient Medications   Medication     atorvastatin (LIPITOR) 10 MG tablet     econazole nitrate 1 % external cream     estradiol (VIVELLE-DOT) 0.025 MG/24HR bi-weekly patch     famotidine (PEPCID) 40 MG tablet     imiquimod (ALDARA) 5 % external cream     omeprazole (PRILOSEC) 20 MG DR capsule     diclofenac (VOLTAREN) 1 % topical gel     Current Facility-Administered Medications   Medication     lidocaine 1% with EPINEPHrine 1:100,000 injection 3 mL      Past Medical History:   Patient Active Problem List   Diagnosis     Multiple benign nevi     Hot flashes     Neoplasm of uncertain behavior of skin     Tinea pedis of both feet     Deviated nasal septum     Seborrheic keratoses, inflamed     Superficial basal cell carcinoma     Hypertrophy of both inferior nasal turbinates     Gastric ulcer without hemorrhage or perforation     Pure hypercholesterolemia     Past Medical History:   Diagnosis Date     Gastroesophageal reflux disease      History of basal cell carcinoma      Hot flashes      Nondisplaced fracture of fifth left metatarsal bone with routine healing 09/12/2016     Pure hypercholesterolemia 12/2/2022       CC Referred Self, MD  No address on file on close of this  encounter.

## 2023-07-11 NOTE — PROGRESS NOTES
[facility] Dermatology Note  Encounter Date: Jul 11, 2023  Office Visit     Dermatology Problem List:  1. LTM  - left upper cutaneous lip, 1mm pearly papule  2. Hx of NMSC  - sfBCC, R proximal anterior thigh s/p imiquimod 3/16/23  - sfBCC, L distal lateral thigh s/p imiquimod 3/16/23  - sfBCC, Right thigh s/p imiquimod 3/16/23  - superficial BCC, L ankle s/p bx 03/4/21 treated with Aldara   - superficial BCC, R anterior shin s/p bx 03/04/21 treated with Aldara  - BCC, R medial lower leg, s/p Mohs 1/27/20  - BCC, R medial calf, s/p Mohs 1/27/20  - BCC, L shin medial, s/p ED&C 11/21/19  - BCC, L shin lateral, s/p ED&C 11/21/19  - BCC, L upper chest status post ED&C  - BCC, abdomen  - BCC, R flank  - History: grew up in [place], no history of radiation, no known well-water history use, family with small numbers of BCCs not at the number she gets   3. Tinea pedis  - current tx: econazole cream  4. Hx dysplastic nevus  - mod, central upper back s/p exc 8/11/2016  5. AK, LN2    ____________________________________________    Assessment & Plan:   # Hx of multiple BCC  Numerous low risk BCC primarily on the lower extremities, no clear risk factors and no other stigmata of underlying BCC genodermatosis. Has been treating topically with imiquimod for superficial BCC without evidence of recurrence. Sun protection discussed. Patient prefers for judicious use of biopsy with chemo topicals preferred over surgery unless absolutely necessary.    # Lesion to monitor  - left upper cutaneous lip, 1mm pearly papule, possible early BCC; we discussed biopsy vs careful clinical followup, patient preferred the latter, understands to RTC if significant change in size or apperance  - photodocumentation    # Benign skin findings  - Physical exam demonstrating benign skin findings as below.  - Cherry hemangiomas  - Benign nevi  - Reassurance provided.  - ABCDEs of melanoma handout provided.  - Advised patient to return to  clinic for any new or concerning lesions.    Procedures Performed:   None    Follow-up: 6 months skin check and spot check, prn for new or changing lesions    Staff and Resident:     Jordin Clifton MD  PGY-4 Dermatology  Pager: [phone]    I have seen and examined this patient and agree with the assessment and plan as documented in the resident's note.    Cuate Ambrocio MD  Dermatology Attending    ____________________________________________    CC: Skin Check (FBSC - patient has no specific spots of concern. Patient has a hx of BC that were removed on her legs. )    HPI:  Ms. Shannan Harris is a(n) 53 year old female who presents today as a return patient for skin check.    Did imiquimod as instructed, no lesions of concern  - Patient is otherwise feeling well, without additional skin concerns.    Labs Reviewed:  N/A    Physical Exam:  Vitals: There were no vitals taken for this visit.  SKIN: Total skin excluding the undergarment areas was performed. The exam included the head/face, neck, both arms, chest, back, abdomen, both legs, digits and/or nails.   - 1mm pearly papule on left upper cutaneous lip with arborizing vessel  - Dome shaped bright red papules on the trunk and extremities   - Scattered brown macules on sun exposed areas.  - Light to dark brown symmetric macules and papules with normal pigment networks on dermoscopy on trunk extremities  - Prior areas of skin cancer examined and no evidence of recurrence based on inspection or palpation  - No other lesions of concern on areas examined.     Medications:  Current Outpatient Medications   Medication    atorvastatin (LIPITOR) 10 MG tablet    econazole nitrate 1 % external cream    estradiol (VIVELLE-DOT) 0.025 MG/24HR bi-weekly patch    famotidine (PEPCID) 40 MG tablet    imiquimod (ALDARA) 5 % external cream    omeprazole (PRILOSEC) 20 MG DR capsule    diclofenac (VOLTAREN) 1 % topical gel     Current Facility-Administered Medications   Medication     lidocaine 1% with EPINEPHrine 1:100,000 injection 3 mL      Past Medical History:   Patient Active Problem List   Diagnosis    Multiple benign nevi    Hot flashes    Neoplasm of uncertain behavior of skin    Tinea pedis of both feet    Deviated nasal septum    Seborrheic keratoses, inflamed    Superficial basal cell carcinoma    Hypertrophy of both inferior nasal turbinates    Gastric ulcer without hemorrhage or perforation    Pure hypercholesterolemia     Past Medical History:   Diagnosis Date    Gastroesophageal reflux disease     History of basal cell carcinoma     Hot flashes     Nondisplaced fracture of fifth left metatarsal bone with routine healing 09/12/2016    Pure hypercholesterolemia 12/2/2022       CC Referred Self, MD  No address on file on close of this encounter.

## 2023-07-11 NOTE — NURSING NOTE
Dermatology Rooming Note    Shannan Harris's goals for this visit include:   Chief Complaint   Patient presents with     Skin Check     FBSC - patient has no specific spots of concern. Patient has a hx of BC that were removed on her legs.      Evelyne Lam

## 2023-09-15 DIAGNOSIS — K21.9 GASTROESOPHAGEAL REFLUX DISEASE WITHOUT ESOPHAGITIS: ICD-10-CM

## 2023-09-15 RX ORDER — FAMOTIDINE 40 MG/1
40 TABLET, FILM COATED ORAL AT BEDTIME
Qty: 90 TABLET | Refills: 0 | Status: SHIPPED | OUTPATIENT
Start: 2023-09-15 | End: 2024-04-25

## 2023-09-15 NOTE — TELEPHONE ENCOUNTER
Refill request from pharmacy. Refilled per protocol. Last clinic visit 12/2/22.  Will be due for annual in December

## 2023-11-17 DIAGNOSIS — N95.1 MENOPAUSAL HOT FLUSHES: ICD-10-CM

## 2023-11-17 RX ORDER — ESTRADIOL 0.03 MG/D
1 FILM, EXTENDED RELEASE TRANSDERMAL
Qty: 24 PATCH | Refills: 3 | Status: SHIPPED | OUTPATIENT
Start: 2023-11-20 | End: 2024-04-25

## 2023-11-17 NOTE — TELEPHONE ENCOUNTER
Received refill request for estradiol 0.025 mg patch.  Last seen 11/1/22.  Cannot fill per RN protocol, so pended to Dr. Blanton for review.

## 2023-12-02 ENCOUNTER — HEALTH MAINTENANCE LETTER (OUTPATIENT)
Age: 53
End: 2023-12-02

## 2024-02-01 ENCOUNTER — OFFICE VISIT (OUTPATIENT)
Dept: DERMATOLOGY | Facility: CLINIC | Age: 54
End: 2024-02-01
Payer: COMMERCIAL

## 2024-02-01 DIAGNOSIS — B35.3 TINEA PEDIS OF BOTH FEET: ICD-10-CM

## 2024-02-01 DIAGNOSIS — C44.91 SUPERFICIAL BASAL CELL CARCINOMA: ICD-10-CM

## 2024-02-01 PROCEDURE — 99214 OFFICE O/P EST MOD 30 MIN: CPT | Performed by: DERMATOLOGY

## 2024-02-01 RX ORDER — NIACINAMIDE 500 MG
TABLET ORAL
Qty: 60 TABLET | Refills: 6 | Status: SHIPPED | OUTPATIENT
Start: 2024-02-01

## 2024-02-01 RX ORDER — ECONAZOLE NITRATE 10 MG/G
CREAM TOPICAL 2 TIMES DAILY
Qty: 85 G | Refills: 1 | Status: SHIPPED | OUTPATIENT
Start: 2024-02-01 | End: 2024-04-25

## 2024-02-01 RX ORDER — IMIQUIMOD 12.5 MG/.25G
CREAM TOPICAL
Qty: 24 PACKET | Refills: 2 | Status: SHIPPED | OUTPATIENT
Start: 2024-02-01

## 2024-02-01 ASSESSMENT — PAIN SCALES - GENERAL: PAINLEVEL: NO PAIN (0)

## 2024-02-01 NOTE — PROGRESS NOTES
[facility] Dermatology Note  Encounter Date: Feb 1, 2024  Office Visit     Dermatology Problem List:  1. Lesion to monitor  - left upper cutaneous lip, 1mm pearly papule  2. Hx of NMSC, taking niacinamide  - sfBCC, R proximal anterior thigh s/p imiquimod 3/16/23  - sfBCC, L distal lateral thigh s/p imiquimod 3/16/23  - sfBCC, Right thigh s/p imiquimod 3/16/23  - superficial BCC, L ankle s/p bx 03/4/21 treated with Aldara   - superficial BCC, R anterior shin s/p bx 03/04/21 treated with Aldara  - BCC, R medial lower leg, s/p Mohs 1/27/20  - BCC, R medial calf, s/p Mohs 1/27/20  - BCC, L shin medial, s/p ED&C 11/21/19  - BCC, L shin lateral, s/p ED&C 11/21/19  - BCC, L upper chest status post ED&C  - BCC, abdomen  - BCC, R flank  - History: grew up in [place], no history of radiation, no known well-water history use, family with small numbers of BCCs not at the number she gets   3. Tinea pedis  - current tx: econazole cream  4. Hx dysplastic nevus  - mod, central upper back s/p exc 8/11/2016  5. AK, LN2    ____________________________________________    Assessment & Plan:    # Lesion to monitor  1 mm dome-shaped papule on L upper cutaneous lip, monitoring since 7/11/2023. No interval changes. Patient wants to continue to defer biopsy, which is reasonable. Continue to monitor. Encouraged patient to notify clinic if it is changing/tender/bleeding.     #Hx multiple BCC  Small pink papule on L calf. Dermoscopy consistent with early BCC. Discussed treatment options, including imiquimod treatment or biopsy. After shared decision-making conversation, patient wants to apply imiquimod to lesion. Also discussed niacinamide for its effect of NMSC prevention. Given her hx of multiple BCC, recommended niacinamide. Patient will try it.   Plan:  -Start niacinamide 500mg BID  -Start applying imiquimod 5% cream to lesion on L calf     #Tinea pedis, mild  Patient uses econazole cream for tinea pedis. Minimal scale on  exam. Discussed continuation, and patient would like refills.   -Continue econazole 1% cream for feet    #FBSE  In addition to findings noted above, there were multiple benign nevi and cherry angiomas. Provided reassurance about benign nature of these lesions. Discussed monitoring for changing, bleeding, or tender lesions and notifying clinic if present. Given hx of BCC, discussed RTC in 6 months vs 1 year, and patient preferred 6 month follow-up.   -Continue regular skin checks, next in 6 months    Follow-up: 6 month(s) in-person, or earlier for new or changing lesions    Staff and Medical Student:   Seen and staffed with Dr. Cristóbal Naranjo, MS3    I was present with the medical student who participated in the service and in the documentation.  I have verified the history and personally performed the physical exam and medical decision making.  I agree with the assessment and plan of care as documented in the note.    Cuate Ambrocio MD  Dermatology Attending    ____________________________________________    CC: Skin Check (Here today for a skin check. No concerns. )    HPI:  Ms. Shannan Harris is a(n) 53 year old female who presents today as a return patient for FBSE. She was previously seen 7/11/2023, and told to monitor lesion on upper lip. She reports it has not changed, and it is not tender or bleeding. She does not want a biopsy if possible, because she is worried about a scar in the area. She noticed some bumps on the back of her scalp, which developed around 6 months ago. They were sometimes itchy, but not painful or bleeding. They are not present today. She has been watching for other lesions, and she has not noticed tender, bleeding, or changing spots. She still has her foot fungus, which improves with econazole but then recurs. She is wondering if there is a stronger cream, or if she should continue with econazole. Patient is otherwise feeling well, without additional skin  concerns.    Labs:  None reviewed.    Physical Exam:  Vitals: There were no vitals taken for this visit.  SKIN: Full skin, which includes the head/face, both arms, chest, back, abdomen,both legs, buttocks, digits and/or nails, was examined. Genitalia was not examined.  -Approx. 1 mm pearly, dome-shaped papule on L upper cutaneous lip; no change compared to previous clinical photograph  -Approx 1 mm light pink papule on L calf with abnormal vessels on dermoscopy  -Minimal scale on plantar feet  -Scattered brown macules on sun-exposed areas  -Scattered light to dark brown symmetric macules and papules on trunk and extremities with regular pigmentation on dermoscopy  -Prior areas of skin cancer examined with no evidence of recurrence  -Scattered bright red macules and papules on trunk and extremities  - No other lesions of concern on areas examined.     Medications:  Current Outpatient Medications   Medication     atorvastatin (LIPITOR) 10 MG tablet     diclofenac (VOLTAREN) 1 % topical gel     econazole nitrate 1 % external cream     estradiol (VIVELLE-DOT) 0.025 MG/24HR bi-weekly patch     famotidine (PEPCID) 40 MG tablet     imiquimod (ALDARA) 5 % external cream     omeprazole (PRILOSEC) 20 MG DR capsule     Current Facility-Administered Medications   Medication     lidocaine 1% with EPINEPHrine 1:100,000 injection 3 mL      Past Medical History:   Patient Active Problem List   Diagnosis     Multiple melanocytic nevi     Hot flashes     Neoplasm of uncertain behavior of skin     Tinea pedis of both feet     Deviated nasal septum     Seborrheic keratoses, inflamed     Superficial basal cell carcinoma     Hypertrophy of both inferior nasal turbinates     Gastric ulcer without hemorrhage or perforation     Pure hypercholesterolemia     Past Medical History:   Diagnosis Date     Gastroesophageal reflux disease      History of basal cell carcinoma      Hot flashes      Nondisplaced fracture of fifth left metatarsal bone  with routine healing 09/12/2016     Pure hypercholesterolemia 12/2/2022        CC No referring provider defined for this encounter. on close of this encounter.

## 2024-02-01 NOTE — NURSING NOTE
Dermatology Rooming Note    Shannan Harris's goals for this visit include:   Chief Complaint   Patient presents with    Skin Check     Here today for a skin check. No concerns.      Denice Snell RN     03-Jul-2017 22:52

## 2024-02-01 NOTE — LETTER
2/1/2024       RE: Shannan Harris  [address]  [facility] [address]     Dear Colleague,    Thank you for referring your patient, Shannan Harris, to the [facility] [place] at [facility]. Please see a copy of my visit note below.    [facility] Dermatology Note  Encounter Date: Feb 1, 2024  Office Visit     Dermatology Problem List:  1. Lesion to monitor  - left upper cutaneous lip, 1mm pearly papule  2. Hx of NMSC, taking niacinamide  - sfBCC, R proximal anterior thigh s/p imiquimod 3/16/23  - sfBCC, L distal lateral thigh s/p imiquimod 3/16/23  - sfBCC, Right thigh s/p imiquimod 3/16/23  - superficial BCC, L ankle s/p bx 03/4/21 treated with Aldara   - superficial BCC, R anterior shin s/p bx 03/04/21 treated with Aldara  - BCC, R medial lower leg, s/p Mohs 1/27/20  - BCC, R medial calf, s/p Mohs 1/27/20  - BCC, L shin medial, s/p ED&C 11/21/19  - BCC, L shin lateral, s/p ED&C 11/21/19  - BCC, L upper chest status post ED&C  - BCC, abdomen  - BCC, R flank  - History: grew up in [place], no history of radiation, no known well-water history use, family with small numbers of BCCs not at the number she gets   3. Tinea pedis  - current tx: econazole cream  4. Hx dysplastic nevus  - mod, central upper back s/p exc 8/11/2016  5. AK, LN2    ____________________________________________    Assessment & Plan:    # Lesion to monitor  1 mm dome-shaped papule on L upper cutaneous lip, monitoring since 7/11/2023. No interval changes. Patient wants to continue to defer biopsy, which is reasonable. Continue to monitor. Encouraged patient to notify clinic if it is changing/tender/bleeding.     #Hx multiple BCC  Small pink papule on L calf. Dermoscopy consistent with early BCC. Discussed treatment options, including imiquimod treatment or biopsy. After shared decision-making conversation, patient wants to apply imiquimod to lesion.  Also discussed niacinamide for its effect of NMSC prevention. Given her hx of multiple BCC, recommended niacinamide. Patient will try it.   Plan:  -Start niacinamide 500mg BID  -Start applying imiquimod 5% cream to lesion on L calf     #Tinea pedis, mild  Patient uses econazole cream for tinea pedis. Minimal scale on exam. Discussed continuation, and patient would like refills.   -Continue econazole 1% cream for feet    #FBSE  In addition to findings noted above, there were multiple benign nevi and cherry angiomas. Provided reassurance about benign nature of these lesions. Discussed monitoring for changing, bleeding, or tender lesions and notifying clinic if present. Given hx of BCC, discussed RTC in 6 months vs 1 year, and patient preferred 6 month follow-up.   -Continue regular skin checks, next in 6 months    Follow-up: 6 month(s) in-person, or earlier for new or changing lesions    Staff and Medical Student:   Seen and staffed with Dr. Cristóbal Naranjo, MS3    I was present with the medical student who participated in the service and in the documentation.  I have verified the history and personally performed the physical exam and medical decision making.  I agree with the assessment and plan of care as documented in the note.    Cuate Ambrocio MD  Dermatology Attending    ____________________________________________    CC: Skin Check (Here today for a skin check. No concerns. )    HPI:  Ms. Shannan Harris is a(n) 53 year old female who presents today as a return patient for FBSE. She was previously seen 7/11/2023, and told to monitor lesion on upper lip. She reports it has not changed, and it is not tender or bleeding. She does not want a biopsy if possible, because she is worried about a scar in the area. She noticed some bumps on the back of her scalp, which developed around 6 months ago. They were sometimes itchy, but not painful or bleeding. They are not present today. She has been watching for other  lesions, and she has not noticed tender, bleeding, or changing spots. She still has her foot fungus, which improves with econazole but then recurs. She is wondering if there is a stronger cream, or if she should continue with econazole. Patient is otherwise feeling well, without additional skin concerns.    Labs:  None reviewed.    Physical Exam:  Vitals: There were no vitals taken for this visit.  SKIN: Full skin, which includes the head/face, both arms, chest, back, abdomen,both legs, buttocks, digits and/or nails, was examined. Genitalia was not examined.  -Approx. 1 mm pearly, dome-shaped papule on L upper cutaneous lip; no change compared to previous clinical photograph  -Approx 1 mm light pink papule on L calf with abnormal vessels on dermoscopy  -Minimal scale on plantar feet  -Scattered brown macules on sun-exposed areas  -Scattered light to dark brown symmetric macules and papules on trunk and extremities with regular pigmentation on dermoscopy  -Prior areas of skin cancer examined with no evidence of recurrence  -Scattered bright red macules and papules on trunk and extremities  - No other lesions of concern on areas examined.     Medications:  Current Outpatient Medications   Medication    atorvastatin (LIPITOR) 10 MG tablet    diclofenac (VOLTAREN) 1 % topical gel    econazole nitrate 1 % external cream    estradiol (VIVELLE-DOT) 0.025 MG/24HR bi-weekly patch    famotidine (PEPCID) 40 MG tablet    imiquimod (ALDARA) 5 % external cream    omeprazole (PRILOSEC) 20 MG DR capsule     Current Facility-Administered Medications   Medication    lidocaine 1% with EPINEPHrine 1:100,000 injection 3 mL      Past Medical History:   Patient Active Problem List   Diagnosis    Multiple melanocytic nevi    Hot flashes    Neoplasm of uncertain behavior of skin    Tinea pedis of both feet    Deviated nasal septum    Seborrheic keratoses, inflamed    Superficial basal cell carcinoma    Hypertrophy of both inferior nasal  turbinates    Gastric ulcer without hemorrhage or perforation    Pure hypercholesterolemia     Past Medical History:   Diagnosis Date    Gastroesophageal reflux disease     History of basal cell carcinoma     Hot flashes     Nondisplaced fracture of fifth left metatarsal bone with routine healing 09/12/2016    Pure hypercholesterolemia 12/2/2022        CC No referring provider defined for this encounter. on close of this encounter.

## 2024-03-05 ENCOUNTER — TELEPHONE (OUTPATIENT)
Dept: TRANSPLANT | Facility: CLINIC | Age: 54
End: 2024-03-05
Payer: COMMERCIAL

## 2024-03-05 NOTE — TELEPHONE ENCOUNTER
if you would like to schedule a follow up appointment with the dermatology clinic, please call us at [phone]

## 2024-03-13 ENCOUNTER — ANCILLARY PROCEDURE (OUTPATIENT)
Dept: MAMMOGRAPHY | Facility: CLINIC | Age: 54
End: 2024-03-13
Attending: OBSTETRICS & GYNECOLOGY
Payer: COMMERCIAL

## 2024-03-13 DIAGNOSIS — Z12.31 VISIT FOR SCREENING MAMMOGRAM: ICD-10-CM

## 2024-03-13 PROCEDURE — 77067 SCR MAMMO BI INCL CAD: CPT

## 2024-03-13 PROCEDURE — 77063 BREAST TOMOSYNTHESIS BI: CPT

## 2024-04-25 ENCOUNTER — LAB (OUTPATIENT)
Dept: LAB | Facility: CLINIC | Age: 54
End: 2024-04-25
Attending: OBSTETRICS & GYNECOLOGY
Payer: COMMERCIAL

## 2024-04-25 ENCOUNTER — OFFICE VISIT (OUTPATIENT)
Dept: OBGYN | Facility: CLINIC | Age: 54
End: 2024-04-25
Attending: OBSTETRICS & GYNECOLOGY
Payer: COMMERCIAL

## 2024-04-25 VITALS
SYSTOLIC BLOOD PRESSURE: 129 MMHG | HEART RATE: 73 BPM | HEIGHT: 62 IN | DIASTOLIC BLOOD PRESSURE: 76 MMHG | BODY MASS INDEX: 24.62 KG/M2 | WEIGHT: 133.8 LBS

## 2024-04-25 DIAGNOSIS — Z13.220 SCREENING FOR HYPERLIPIDEMIA: ICD-10-CM

## 2024-04-25 DIAGNOSIS — N95.1 MENOPAUSAL HOT FLUSHES: ICD-10-CM

## 2024-04-25 DIAGNOSIS — K21.00 GASTROESOPHAGEAL REFLUX DISEASE WITH ESOPHAGITIS WITHOUT HEMORRHAGE: ICD-10-CM

## 2024-04-25 DIAGNOSIS — R74.8 ELEVATED LIVER ENZYMES: ICD-10-CM

## 2024-04-25 DIAGNOSIS — Z01.419 ENCOUNTER FOR GYNECOLOGICAL EXAMINATION WITHOUT ABNORMAL FINDING: Primary | ICD-10-CM

## 2024-04-25 DIAGNOSIS — K21.9 GASTROESOPHAGEAL REFLUX DISEASE WITHOUT ESOPHAGITIS: ICD-10-CM

## 2024-04-25 DIAGNOSIS — Z13.1 SCREENING FOR DIABETES MELLITUS: ICD-10-CM

## 2024-04-25 LAB
ALBUMIN SERPL BCG-MCNC: 4.7 G/DL (ref 3.5–5.2)
ALP SERPL-CCNC: 150 U/L (ref 40–150)
ALT SERPL W P-5'-P-CCNC: 63 U/L (ref 0–50)
AST SERPL W P-5'-P-CCNC: 38 U/L (ref 0–45)
BILIRUB DIRECT SERPL-MCNC: <0.2 MG/DL (ref 0–0.3)
BILIRUB SERPL-MCNC: 0.3 MG/DL
CHOLEST SERPL-MCNC: 322 MG/DL
FASTING STATUS PATIENT QL REPORTED: YES
HBA1C MFR BLD: 5.5 %
HDLC SERPL-MCNC: 91 MG/DL
LDLC SERPL CALC-MCNC: 213 MG/DL
NONHDLC SERPL-MCNC: 231 MG/DL
PROT SERPL-MCNC: 7.3 G/DL (ref 6.4–8.3)
TRIGL SERPL-MCNC: 91 MG/DL

## 2024-04-25 PROCEDURE — G0101 CA SCREEN;PELVIC/BREAST EXAM: HCPCS | Performed by: OBSTETRICS & GYNECOLOGY

## 2024-04-25 PROCEDURE — 80061 LIPID PANEL: CPT

## 2024-04-25 PROCEDURE — 99214 OFFICE O/P EST MOD 30 MIN: CPT | Mod: 25 | Performed by: OBSTETRICS & GYNECOLOGY

## 2024-04-25 PROCEDURE — 36415 COLL VENOUS BLD VENIPUNCTURE: CPT

## 2024-04-25 PROCEDURE — 80076 HEPATIC FUNCTION PANEL: CPT

## 2024-04-25 PROCEDURE — 83036 HEMOGLOBIN GLYCOSYLATED A1C: CPT

## 2024-04-25 RX ORDER — ESTRADIOL 0.03 MG/D
1 FILM, EXTENDED RELEASE TRANSDERMAL
Qty: 24 PATCH | Refills: 3 | Status: SHIPPED | OUTPATIENT
Start: 2024-04-25

## 2024-04-25 RX ORDER — FAMOTIDINE 40 MG/1
40 TABLET, FILM COATED ORAL AT BEDTIME
Qty: 90 TABLET | Refills: 0 | Status: SHIPPED | OUTPATIENT
Start: 2024-04-25

## 2024-04-25 ASSESSMENT — PAIN SCALES - GENERAL: PAINLEVEL: NO PAIN (0)

## 2024-04-25 NOTE — LETTER
4/25/2024       RE: Shannan Harris  [address]  [facility] [address]     Dear Colleague,    Thank you for referring your patient, Shannan Harris, to the [facility] [place] at [facility]. Please see a copy of my visit note below.    Progress Note    SUBJECTIVE:  Shannan Harris is an 54 year old  who requests a breast and pelvic exam.    Concerns today include: Menopause management. Has been using vivelle dot, changes every 5-7 days. Does notice symptoms if gets close to 7 days. Wonders about lower dosing.     Was started on lipitor last year but stopped taking due to side effects.     Mammogram current: yes    HISTORY:  Prescription Medications as of 4/27/2024         Rx Number Disp Refills Start End Last Dispensed Date Next Fill Date Owning Pharmacy    atorvastatin (LIPITOR) 10 MG tablet  90 tablet 1 12/2/2022 --   [facility] [place] [address] [place] - [address] AT [facility]    Sig: Take 1 tablet (10 mg) by mouth daily    Class: E-Prescribe    Route: Oral    estradiol (VIVELLE-DOT) 0.025 MG/24HR bi-weekly patch  24 patch 3 4/25/2024 --   [facility] [address] [place] [address] [place] [address] AT [facility]    Sig: Place 1 patch onto the skin twice a week    Class: E-Prescribe    Route: Transdermal    famotidine (PEPCID) 40 MG tablet  90 tablet 0 4/25/2024 --   [facility] [address] [place] [address] [place] [address] AT [facility]    Sig: Take 1 tablet (40 mg) by mouth at bedtime    Class: E-Prescribe    Route: Oral    imiquimod (ALDARA) 5 % external cream  24 packet 2 2/1/2024 --   [facility] [place] [address] [place] - [address] AT [facility]    Sig: Apply once daily 5 days per week, for 6 weeks to area on left posterior calf    Class: E-Prescribe    niacinamide 500 MG tablet  60 tablet 6 2/1/2024 --   [id number] [place], [place] - [address] AT [facility]    Sig: One pill twice daily    Class: E-Prescribe    omeprazole (PRILOSEC) 20 MG DR capsule  90 capsule 1 2024 --   [id number] - [place], [place] - [address] AT [facility]    Sig: Take 1 capsule (20 mg) by mouth daily as needed (heartburn)    Class: E-Prescribe    Route: Oral          Clinic-Administered Medications as of 2024         Dose Frequency Start End    lidocaine 1% with EPINEPHrine 1:100,000 injection 3 mL 3 mL ONCE 2020 --    Route: Intradermal          Allergies   Allergen Reactions    Nkda [No Known Drug Allergy]      Immunization History   Administered Date(s) Administered    COVID-19 MONOVALENT 12+ (Pfizer) 2021    COVID-19 Monovalent 18+ (Moderna) 2022    COVID-19 Vaccine (Magin) 2021    Flu, Unspecified 2022    Influenza (H1N1) 2009    Influenza (IIV3) PF 10/28/2012, 10/08/2013, 10/01/2014    Influenza Vaccine >6 months,quad, PF 10/05/2017    Influenza Vaccine, 6+MO IM (QUADRIVALENT W/PRESERVATIVES) 10/01/2020    Influenza vaccine ages 6-35 months 10/01/2020    Influenza,INJ,MDCK,PF,Quad >6mo(Flucelvax) 2023    TDAP Vaccine (Boostrix) 2015    Zoster recombinant adjuvanted (SHINGRIX) 2022       OB History    Para Term  AB Living   0 0 0 0 0 0   SAB IAB Ectopic Multiple Live Births   0 0 0 0 0     Past Medical History:   Diagnosis Date    Gastroesophageal reflux disease     History of basal cell carcinoma     Hot flashes     Nondisplaced fracture of fifth left metatarsal bone with routine healing 2016    Pure hypercholesterolemia 2022     Past Surgical History:   Procedure Laterality Date    BIOPSY OF SKIN LESION      HYSTERECTOMY      for uterine myoma and painful periods; has ovaries     Family History   Problem Relation Age of Onset    Skin Cancer Mother         BCC     "Hypertension Mother     Hyperlipidemia Mother     Gout Mother     Heart Disease Father     Cancer Brother         BCC    Brain Cancer Paternal Uncle     Pancreatic Cancer Paternal Uncle     Cancer - colorectal No family hx of     Breast Cancer No family hx of     Melanoma No family hx of      Social History     Socioeconomic History    Marital status:      Spouse name: None    Number of children: 0    Years of education: None    Highest education level: None   Occupational History    Occupation: research     Employer: [employer]   Tobacco Use    Smoking status: Never    Smokeless tobacco: Never   Vaping Use    Vaping status: Never Used   Substance and Sexual Activity    Alcohol use: No     Alcohol/week: 0.0 standard drinks of alcohol    Drug use: No    Sexual activity: Yes     Partners: Male     Birth control/protection: Surgical     Comment: st   Social History Narrative    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    One son, born by surrogacy in [place] in 2014.    Getting  in June 2017.       ROS    EXAM:  Blood pressure 129/76, pulse 73, height 1.575 m (5' 2.01\"), weight 60.7 kg (133 lb 12.8 oz), not currently breastfeeding. Body mass index is 24.47 kg/m .  General appearance: Pleasant female in no acute distress.     BREAST EXAM:  Breast: Without visible skin changes. No dimpling or lesions seen.   Breasts supple, non-tender with palpation, no dominant mass, nodularity, or nipple discharge noted bilaterally. Axillary nodes negative.      PELVIC EXAM:  EG/BUS: Normal genital architecture without lesions, erythema or abnormal secretions Bartholin's, Urethra, Calabash's normal   Urethral meatus: normal   Urethra: no masses, tenderness, or scarring   Bladder: no masses or tenderness   Vagina: moist, pink, rugae with creamy, white, and odorless  secretions  Cervix/Uterus: surgically absent      ASSESSMENT:  Encounter Diagnoses   Name Primary?    Menopausal hot flushes     Gastroesophageal reflux " disease without esophagitis     Gastroesophageal reflux disease with esophagitis without hemorrhage     Screening for hyperlipidemia Yes    Screening for diabetes mellitus     Elevated liver enzymes     Encounter for gynecological examination without abnormal finding       54 year old Female Pelvic and Breast Exam  HRT Surveillance    PLAN:   Orders Placed This Encounter   Procedures    Pelvic and Breast Exam Procedure []    Lipid Profile    Hemoglobin A1c    Hepatic Panel   Continue vivelle dot- on lowest dose. Will continue to use and change every 5-7 days.     Return in one year/PRN for preventive care or problems/concerns.     Verbalized understanding and agreement with visit plan.    Nanette Blanton MD

## 2024-04-27 NOTE — PROGRESS NOTES
Progress Note    SUBJECTIVE:  Shannan Harris is an 54 year old  who requests a breast and pelvic exam.    Concerns today include: Menopause management. Has been using vivelle dot, changes every 5-7 days. Does notice symptoms if gets close to 7 days. Wonders about lower dosing.     Was started on lipitor last year but stopped taking due to side effects.     Mammogram current: yes    HISTORY:  Prescription Medications as of 4/27/2024         Rx Number Disp Refills Start End Last Dispensed Date Next Fill Date Owning Pharmacy    atorvastatin (LIPITOR) 10 MG tablet  90 tablet 1 12/2/2022 --   [facility] [address] [place], [place] [address], [place] - [address] AT [facility]    Sig: Take 1 tablet (10 mg) by mouth daily    Class: E-Prescribe    Route: Oral    estradiol (VIVELLE-DOT) 0.025 MG/24HR bi-weekly patch  24 patch 3 4/25/2024 --   [id number] [place] [place] [facility] [address] AT [facility]    Sig: Place 1 patch onto the skin twice a week    Class: E-Prescribe    Route: Transdermal    famotidine (PEPCID) 40 MG tablet  90 tablet 0 4/25/2024 --   [id number] - [place], [place] - [address] AT [facility]    Sig: Take 1 tablet (40 mg) by mouth at bedtime    Class: E-Prescribe    Route: Oral    imiquimod (ALDARA) 5 % external cream  24 packet 2 2/1/2024 --   [id number] [place] [place] [facility] [address] AT [facility]    Sig: Apply once daily 5 days per week, for 6 weeks to area on left posterior calf    Class: E-Prescribe    niacinamide 500 MG tablet  60 tablet 6 2/1/2024 --   [facility] [place] [place] [facility] [address] AT [facility]    Sig: One pill twice daily    Class: E-Prescribe    omeprazole (PRILOSEC) 20 MG DR capsule  90 capsule 1 4/25/2024 --   [id number] - [place], [place] - [address] AT [facility]    Sig:  Take 1 capsule (20 mg) by mouth daily as needed (heartburn)    Class: E-Prescribe    Route: Oral          Clinic-Administered Medications as of 2024         Dose Frequency Start End    lidocaine 1% with EPINEPHrine 1:100,000 injection 3 mL 3 mL ONCE 2020 --    Route: Intradermal          Allergies   Allergen Reactions    Nkda [No Known Drug Allergy]      Immunization History   Administered Date(s) Administered    COVID-19 MONOVALENT 12+ (Pfizer) 2021    COVID-19 Monovalent 18+ (Moderna) 2022    COVID-19 Vaccine (Kajal) 2021    Flu, Unspecified 2022    Influenza (H1N1) 2009    Influenza (IIV3) PF 10/28/2012, 10/08/2013, 10/01/2014    Influenza Vaccine >6 months,quad, PF 10/05/2017    Influenza Vaccine, 6+MO IM (QUADRIVALENT W/PRESERVATIVES) 10/01/2020    Influenza vaccine ages 6-35 months 10/01/2020    Influenza,INJ,MDCK,PF,Quad >6mo(Flucelvax) 2023    TDAP Vaccine (Boostrix) 2015    Zoster recombinant adjuvanted (SHINGRIX) 2022       OB History    Para Term  AB Living   0 0 0 0 0 0   SAB IAB Ectopic Multiple Live Births   0 0 0 0 0     Past Medical History:   Diagnosis Date    Gastroesophageal reflux disease     History of basal cell carcinoma     Hot flashes     Nondisplaced fracture of fifth left metatarsal bone with routine healing 2016    Pure hypercholesterolemia 2022     Past Surgical History:   Procedure Laterality Date    BIOPSY OF SKIN LESION      HYSTERECTOMY      for uterine myoma and painful periods; has ovaries     Family History   Problem Relation Age of Onset    Skin Cancer Mother         BCC    Hypertension Mother     Hyperlipidemia Mother     Gout Mother     Heart Disease Father     Cancer Brother         BCC    Brain Cancer Paternal Uncle     Pancreatic Cancer Paternal Uncle     Cancer - colorectal No family hx of     Breast Cancer No family hx of     Melanoma No family hx of      Social History  "    Socioeconomic History    Marital status:      Spouse name: None    Number of children: 0    Years of education: None    Highest education level: None   Occupational History    Occupation: research     Employer: [employer]   Tobacco Use    Smoking status: Never    Smokeless tobacco: Never   Vaping Use    Vaping status: Never Used   Substance and Sexual Activity    Alcohol use: No     Alcohol/week: 0.0 standard drinks of alcohol    Drug use: No    Sexual activity: Yes     Partners: Male     Birth control/protection: Surgical     Comment: hyst   Social History Narrative    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    One son, born by surrogacy in [place] in 2014.    Getting  in June 2017.       ROS    EXAM:  Blood pressure 129/76, pulse 73, height 1.575 m (5' 2.01\"), weight 60.7 kg (133 lb 12.8 oz), not currently breastfeeding. Body mass index is 24.47 kg/m .  General appearance: Pleasant female in no acute distress.     BREAST EXAM:  Breast: Without visible skin changes. No dimpling or lesions seen.   Breasts supple, non-tender with palpation, no dominant mass, nodularity, or nipple discharge noted bilaterally. Axillary nodes negative.      PELVIC EXAM:  EG/BUS: Normal genital architecture without lesions, erythema or abnormal secretions Bartholin's, Urethra, Pearsall's normal   Urethral meatus: normal   Urethra: no masses, tenderness, or scarring   Bladder: no masses or tenderness   Vagina: moist, pink, rugae with creamy, white, and odorless  secretions  Cervix/Uterus: surgically absent      ASSESSMENT:  Encounter Diagnoses   Name Primary?    Menopausal hot flushes     Gastroesophageal reflux disease without esophagitis     Gastroesophageal reflux disease with esophagitis without hemorrhage     Screening for hyperlipidemia Yes    Screening for diabetes mellitus     Elevated liver enzymes     Encounter for gynecological examination without abnormal finding       54 year old Female Pelvic and Breast " Exam  HRT Surveillance    PLAN:   Orders Placed This Encounter   Procedures    Pelvic and Breast Exam Procedure []    Lipid Profile    Hemoglobin A1c    Hepatic Panel   Continue vivelle dot- on lowest dose. Will continue to use and change every 5-7 days.     Return in one year/PRN for preventive care or problems/concerns.     Verbalized understanding and agreement with visit plan.    Nanette Blanton MD

## 2024-10-10 ENCOUNTER — OFFICE VISIT (OUTPATIENT)
Dept: DERMATOLOGY | Facility: CLINIC | Age: 54
End: 2024-10-10
Attending: DERMATOLOGY
Payer: COMMERCIAL

## 2024-10-10 DIAGNOSIS — Z85.828 HISTORY OF BASAL CELL CARCINOMA: ICD-10-CM

## 2024-10-10 DIAGNOSIS — C44.91 SUPERFICIAL BASAL CELL CARCINOMA: ICD-10-CM

## 2024-10-10 DIAGNOSIS — D48.5 NEOPLASM OF UNCERTAIN BEHAVIOR OF SKIN: ICD-10-CM

## 2024-10-10 DIAGNOSIS — D22.9 MULTIPLE MELANOCYTIC NEVI: Primary | ICD-10-CM

## 2024-10-10 PROCEDURE — 88305 TISSUE EXAM BY PATHOLOGIST: CPT | Mod: TC | Performed by: DERMATOLOGY

## 2024-10-10 PROCEDURE — 99213 OFFICE O/P EST LOW 20 MIN: CPT | Mod: 25 | Performed by: DERMATOLOGY

## 2024-10-10 PROCEDURE — 11104 PUNCH BX SKIN SINGLE LESION: CPT | Performed by: DERMATOLOGY

## 2024-10-10 ASSESSMENT — PAIN SCALES - GENERAL: PAINLEVEL: NO PAIN (0)

## 2024-10-10 NOTE — LETTER
10/10/2024       RE: Shannan Harris  [address]  [facility] [address]     Dear Colleague,    Thank you for referring your patient, Shannan Harris, to the [facility]MAT[facility] [place] at [facility]. Please see a copy of my visit note below.    [facility] Dermatology Note  Encounter Date: Oct 10, 2024  Office Visit     Dermatology Problem List:  1. Lesion to monitor  - left upper cutaneous lip, 1mm pearly papule -- biopsied 10/10/24, r/o NMSC  - R nasal tip, 3mm light brown macule with inferior erythema, c/f early BCC  2. Hx of NMSC, taking niacinamide  - early BCC (not biopsied), L calf, s/p Imiquimod 2/1/24  - sfBCC, R proximal anterior thigh s/p imiquimod 3/16/23  - sfBCC, L distal lateral thigh s/p imiquimod 3/16/23  - sfBCC, Right thigh s/p imiquimod 3/16/23  - superficial BCC, L ankle s/p bx 03/4/21 treated with Aldara   - superficial BCC, R anterior shin s/p bx 03/04/21 treated with Aldara  - BCC, R medial lower leg, s/p Mohs 1/27/20  - BCC, R medial calf, s/p Mohs 1/27/20  - BCC, L shin medial, s/p ED&C 11/21/19  - BCC, L shin lateral, s/p ED&C 11/21/19  - BCC, L upper chest status post ED&C  - BCC, abdomen  - BCC, R flank  - History: grew up in [place], no history of radiation, no known well-water history use, family with small numbers of BCCs not at the number she gets   3. Tinea pedis  - current tx: econazole cream  4. Hx dysplastic nevus  - mod, central upper back s/p exc 8/11/2016  5. AK, LN2       ____________________________________________    Assessment & Plan:    # Neoplasm of uncertain behavior on L upper cutaneous lip.  Suspicious for BCC.  - Punch biopsy performed today (see procedure note(s) below).  - Ddx includes r/o NMSC.    # History of nonmelanoma skin cancer, no clincial evidence of recurrence.   - ABCDEs: Counseled ABCDEs of melanoma: Asymmetry, Border (irregularity), Color (not uniform, changes in  color), Diameter (greater than 6 mm which is about the size of a pencil eraser), and Evolving (any changes in preexisting moles).  - Monitor: Patient to continue monitoring at home and will contact the clinic for any changes.  - Sun protection: Counseled SPF30+ sunscreen, UPF clothing, sun avoidance, tanning bed avoidance.    # Nasal tip papule  - Discussed with patient that we favor a benign mole, however the inferior erythema could represent a early, developing BCC and cannot be ruled out at this time.   - Counseled patient to continue to monitor lesion, and if erythema persists, to come back to clinic and schedule a follow-up appointment.    # Multiple benign nevi  # Cherry angiomas  - Discussed benign etiology, reassurance provided.     Procedures Performed:   - Punch biopsy procedure note, location(s): left upper cutaneous lip. After discussion of benefits and risks including but not limited to bleeding, infection, scar, incomplete removal, recurrence, and non-diagnostic biopsy, verbak consent and photographs were obtained. The area was cleaned with isopropyl alcohol. 0.5mL of 1% lidocaine with epinephrine was injected to obtain adequate anesthesia and a 2 mm punch biopsy was performed at site(s). 5-0 Prolene sutures were utilized to approximate the epidermal edges. White petrolatum ointment and a bandage was applied to the wound. Explicit verbal and written wound care instructions were provided. The patient left the dermatology clinic in good condition.   - Procedure(s) performed by faculty.     Follow-up: 6 month(s) in-person, or earlier for new or changing lesions    Staff and Medical Student:     Dianna Dee, MS4  Seen and staffed with Dr. Ambrocio    I was present with the medical student who participated in the service and in the documentation.  I have verified the history and personally performed the physical exam and medical decision making.  I agree with the assessment and plan of care as documented in the  note.  I personally performed all procedures.    Cuate Ambrocio MD  Dermatology Attending  ____________________________________________    CC: Skin Check (Follow up on the BCC. Spot on the upper lip, and nose)    HPI:  Ms. Shannan Harris is a(n) 54 year old female who presents today as a return patient for her 6-month FBSE.    Points to a spot on her lip that has been monitored, she feels like it is growing in size. It is asymptomatic and nontender. Also points to a new red spot on her nose that has been there for 2-3 months. No known treatment to either lesion.    Started taking niacinamide twice daily, no concerns.    Her mom's cousin who is about her age was diagnosed with oral invasive squamous carcinoma.     Patient is otherwise feeling well, without additional skin concerns.    Labs:  None.    Physical Exam:  Vitals: There were no vitals taken for this visit.  SKIN: Full skin, which includes the head/face, both arms, chest, back, abdomen,both legs, genitalia and/or groin buttocks, digits and/or nails, was examined.  - R nasal tip: 3mm light brown macule with erythema inferiorly  - L upper cutaneous lip: 2mm skin-colored papule with central telangiectasia  - trunk, extremities: multiple uniformly pigmented light brown papules, 1-2mm bright red dome-shaped papules  - well healed scars with NER   - No other lesions of concern on areas examined.     Medications:  Current Outpatient Medications   Medication Sig Dispense Refill     atorvastatin (LIPITOR) 10 MG tablet Take 1 tablet (10 mg) by mouth daily 90 tablet 1     estradiol (VIVELLE-DOT) 0.025 MG/24HR bi-weekly patch Place 1 patch onto the skin twice a week 24 patch 3     famotidine (PEPCID) 40 MG tablet Take 1 tablet (40 mg) by mouth at bedtime 90 tablet 0     omeprazole (PRILOSEC) 20 MG DR capsule Take 1 capsule (20 mg) by mouth daily as needed (heartburn) 90 capsule 1     imiquimod (ALDARA) 5 % external cream Apply once daily 5 days per week, for 6 weeks  to area on left posterior calf (Patient not taking: Reported on 4/25/2024) 24 packet 2     niacinamide 500 MG tablet One pill twice daily (Patient not taking: Reported on 4/25/2024) 60 tablet 6     Current Facility-Administered Medications   Medication Dose Route Frequency Provider Last Rate Last Admin     lidocaine 1% with EPINEPHrine 1:100,000 injection 3 mL  3 mL Intradermal Once Cuate Ambrocio MD          Past Medical History:   Patient Active Problem List   Diagnosis     Multiple melanocytic nevi     Hot flashes     Neoplasm of uncertain behavior of skin     Tinea pedis of both feet     Deviated nasal septum     Seborrheic keratoses, inflamed     Superficial basal cell carcinoma     Hypertrophy of both inferior nasal turbinates     Gastric ulcer without hemorrhage or perforation     Pure hypercholesterolemia     Past Medical History:   Diagnosis Date     Gastroesophageal reflux disease      History of basal cell carcinoma      Hot flashes      Nondisplaced fracture of fifth left metatarsal bone with routine healing 09/12/2016     Pure hypercholesterolemia 12/2/2022         Cuate Ambrocio MD  [address] [facility] [address]  [place] [address] on close of this encounter.      Again, thank you for allowing me to participate in the care of your patient.      Sincerely,    Cuate Ambrocio MD

## 2024-10-10 NOTE — PROGRESS NOTES
[facility] Dermatology Note  Encounter Date: Oct 10, 2024  Office Visit     Dermatology Problem List:  1. Lesion to monitor  - left upper cutaneous lip, 1mm pearly papule -- biopsied 10/10/24, r/o NMSC  - R nasal tip, 3mm light brown macule with inferior erythema, c/f early BCC  2. Hx of NMSC, taking niacinamide  - early BCC (not biopsied), L calf, s/p Imiquimod 2/1/24  - sfBCC, R proximal anterior thigh s/p imiquimod 3/16/23  - sfBCC, L distal lateral thigh s/p imiquimod 3/16/23  - sfBCC, Right thigh s/p imiquimod 3/16/23  - superficial BCC, L ankle s/p bx 03/4/21 treated with Aldara   - superficial BCC, R anterior shin s/p bx 03/04/21 treated with Aldara  - BCC, R medial lower leg, s/p Mohs 1/27/20  - BCC, R medial calf, s/p Mohs 1/27/20  - BCC, L shin medial, s/p ED&C 11/21/19  - BCC, L shin lateral, s/p ED&C 11/21/19  - BCC, L upper chest status post ED&C  - BCC, abdomen  - BCC, R flank  - History: grew up in [place], no history of radiation, no known well-water history use, family with small numbers of BCCs not at the number she gets   3. Tinea pedis  - current tx: econazole cream  4. Hx dysplastic nevus  - mod, central upper back s/p exc 8/11/2016  5. AK, LN2       ____________________________________________    Assessment & Plan:    # Neoplasm of uncertain behavior on L upper cutaneous lip.  Suspicious for BCC.  - Punch biopsy performed today (see procedure note(s) below).  - Ddx includes r/o NMSC.    # History of nonmelanoma skin cancer, no clincial evidence of recurrence.   - ABCDEs: Counseled ABCDEs of melanoma: Asymmetry, Border (irregularity), Color (not uniform, changes in color), Diameter (greater than 6 mm which is about the size of a pencil eraser), and Evolving (any changes in preexisting moles).  - Monitor: Patient to continue monitoring at home and will contact the clinic for any changes.  - Sun protection: Counseled SPF30+ sunscreen, UPF clothing, sun avoidance, tanning bed  avoidance.    # Nasal tip papule  - Discussed with patient that we favor a benign mole, however the inferior erythema could represent a early, developing BCC and cannot be ruled out at this time.   - Counseled patient to continue to monitor lesion, and if erythema persists, to come back to clinic and schedule a follow-up appointment.    # Multiple benign nevi  # Cherry angiomas  - Discussed benign etiology, reassurance provided.     Procedures Performed:   - Punch biopsy procedure note, location(s): left upper cutaneous lip. After discussion of benefits and risks including but not limited to bleeding, infection, scar, incomplete removal, recurrence, and non-diagnostic biopsy, verbak consent and photographs were obtained. The area was cleaned with isopropyl alcohol. 0.5mL of 1% lidocaine with epinephrine was injected to obtain adequate anesthesia and a 2 mm punch biopsy was performed at site(s). 5-0 Prolene sutures were utilized to approximate the epidermal edges. White petrolatum ointment and a bandage was applied to the wound. Explicit verbal and written wound care instructions were provided. The patient left the dermatology clinic in good condition.   - Procedure(s) performed by faculty.     Follow-up: 6 month(s) in-person, or earlier for new or changing lesions    Staff and Medical Student:     Dianna Dee, MS4  Seen and staffed with Dr. Ambrocio    I was present with the medical student who participated in the service and in the documentation.  I have verified the history and personally performed the physical exam and medical decision making.  I agree with the assessment and plan of care as documented in the note.  I personally performed all procedures.    Cuate Ambrocio MD  Dermatology Attending  ____________________________________________    CC: Skin Check (Follow up on the BCC. Spot on the upper lip, and nose)    HPI:  Ms. Shannan Harris is a(n) 54 year old female who presents today as a return patient for her  6-month FBSE.    Points to a spot on her lip that has been monitored, she feels like it is growing in size. It is asymptomatic and nontender. Also points to a new red spot on her nose that has been there for 2-3 months. No known treatment to either lesion.    Started taking niacinamide twice daily, no concerns.    Her mom's cousin who is about her age was diagnosed with oral invasive squamous carcinoma.     Patient is otherwise feeling well, without additional skin concerns.    Labs:  None.    Physical Exam:  Vitals: There were no vitals taken for this visit.  SKIN: Full skin, which includes the head/face, both arms, chest, back, abdomen,both legs, genitalia and/or groin buttocks, digits and/or nails, was examined.  - R nasal tip: 3mm light brown macule with erythema inferiorly  - L upper cutaneous lip: 2mm skin-colored papule with central telangiectasia  - trunk, extremities: multiple uniformly pigmented light brown papules, 1-2mm bright red dome-shaped papules  - well healed scars with NER   - No other lesions of concern on areas examined.     Medications:  Current Outpatient Medications   Medication Sig Dispense Refill    atorvastatin (LIPITOR) 10 MG tablet Take 1 tablet (10 mg) by mouth daily 90 tablet 1    estradiol (VIVELLE-DOT) 0.025 MG/24HR bi-weekly patch Place 1 patch onto the skin twice a week 24 patch 3    famotidine (PEPCID) 40 MG tablet Take 1 tablet (40 mg) by mouth at bedtime 90 tablet 0    omeprazole (PRILOSEC) 20 MG DR capsule Take 1 capsule (20 mg) by mouth daily as needed (heartburn) 90 capsule 1    imiquimod (ALDARA) 5 % external cream Apply once daily 5 days per week, for 6 weeks to area on left posterior calf (Patient not taking: Reported on 4/25/2024) 24 packet 2    niacinamide 500 MG tablet One pill twice daily (Patient not taking: Reported on 4/25/2024) 60 tablet 6     Current Facility-Administered Medications   Medication Dose Route Frequency Provider Last Rate Last Admin    lidocaine 1%  with EPINEPHrine 1:100,000 injection 3 mL  3 mL Intradermal Once Cuate Ambrocio MD          Past Medical History:   Patient Active Problem List   Diagnosis    Multiple melanocytic nevi    Hot flashes    Neoplasm of uncertain behavior of skin    Tinea pedis of both feet    Deviated nasal septum    Seborrheic keratoses, inflamed    Superficial basal cell carcinoma    Hypertrophy of both inferior nasal turbinates    Gastric ulcer without hemorrhage or perforation    Pure hypercholesterolemia     Past Medical History:   Diagnosis Date    Gastroesophageal reflux disease     History of basal cell carcinoma     Hot flashes     Nondisplaced fracture of fifth left metatarsal bone with routine healing 09/12/2016    Pure hypercholesterolemia 12/2/2022        CC Cuate Ambrocio MD  [address] [facility] [address]  [place] [address] on close of this encounter.

## 2024-10-21 ENCOUNTER — TELEPHONE (OUTPATIENT)
Dept: DERMATOLOGY | Facility: CLINIC | Age: 54
End: 2024-10-21

## 2024-10-21 DIAGNOSIS — C44.01 BASAL CELL CARCINOMA (BCC) OF UPPER LIP: Primary | ICD-10-CM

## 2024-10-21 NOTE — TELEPHONE ENCOUNTER
Called patient to schedule surgery with Dr. Garcia    Date of Surgery: 11/18    Surgery type: mohs    Consult scheduled: Yes    Has patient had mohs with us before? Yes    Additional comments: pt requested appt date due to travel. Declined optional consultation with Dr. Garcia.     Routing to Dr. Ambrocio and Karrie to place a referral.     Pt also asked if someone from Dr. Ambrocio's team could reach out in regards to her other site of concern that they discussed in her recent appt. Pt unclear how to use the topical. Pt would like a call to discuss.       Zenaida Gutierrez on 10/21/2024 at 9:44 AM

## 2024-10-25 NOTE — TELEPHONE ENCOUNTER
FUTURE VISIT INFORMATION      FUTURE VISIT INFORMATION:  Date: 11.18.24  Time: 8:30  Location: [facility]  REFERRAL INFORMATION:  Referring provider:  Cristóbal  Referring providers clinic:  Derm  Reason for visit/diagnosis  BCC Left upper cutaneous lip      RECORDS REQUESTED FROM:       Clinic name Comments Records Status Imaging Status   Derm 10.10.24  Path # [id number] Epic Epic

## 2024-10-31 PROBLEM — Z85.828 HISTORY OF BASAL CELL CARCINOMA: Status: ACTIVE | Noted: 2024-10-31

## 2024-11-18 ENCOUNTER — OFFICE VISIT (OUTPATIENT)
Dept: DERMATOLOGY | Facility: CLINIC | Age: 54
End: 2024-11-18
Payer: COMMERCIAL

## 2024-11-18 ENCOUNTER — PRE VISIT (OUTPATIENT)
Dept: DERMATOLOGY | Facility: CLINIC | Age: 54
End: 2024-11-18

## 2024-11-18 ENCOUNTER — TELEPHONE (OUTPATIENT)
Dept: DERMATOLOGY | Facility: CLINIC | Age: 54
End: 2024-11-18

## 2024-11-18 VITALS — DIASTOLIC BLOOD PRESSURE: 88 MMHG | HEART RATE: 86 BPM | SYSTOLIC BLOOD PRESSURE: 134 MMHG

## 2024-11-18 DIAGNOSIS — C44.01 BASAL CELL CARCINOMA (BCC) OF UPPER LIP: ICD-10-CM

## 2024-11-18 PROCEDURE — 17311 MOHS 1 STAGE H/N/HF/G: CPT | Performed by: DERMATOLOGY

## 2024-11-18 PROCEDURE — 14060 TIS TRNFR E/N/E/L 10 SQ CM/<: CPT | Performed by: DERMATOLOGY

## 2024-11-18 NOTE — PATIENT INSTRUCTIONS
Wound care    I will experience scar, bleeding, swelling, pain, crusting and redness. I may experience incomplete removal or recurrence. Risks are bleeding, bruising, swelling, infection, nerve damage, & a large wound. A second procedure may be recommended to obtain the best cosmetic or functional result.       A three month office visit with your Surgeon is recommended for scar evaluation. Please reach out sooner if you have concerns about you surgical site/wound.    Caring for your skin after surgery    After your surgery, a pressure bandage will be placed over the area that has stitches. This is important to prevent bleeding. Please follow these instructions over the next 1 to 2 weeks. Following this regimen will help to prevent complications as your wound heals.     For the first 48 hours after your surgery:    Leave the pressure dressing on and keep it dry. If it should come loose, you may re-tape it, but do not take it off.  Relax and take it easy. Do not do any vigorous exercise or heavy lifting. This could cause the wound to bleed.  Post-Operative pain is usually mild. If you are able to take tylenol, You may take plain or extra-strength Tylenol (acetaminophen) As directed on the bottle (do not take more than 4,000mg in one day). If you are able to take ibuprofen, you can alternate the tylenol and ibuprofen.   Avoid alcohol as this may increase your tendency to bleed.   You may put an ice pack around the bandaged area for 20 minutes at a time as needed. This may help reduce swelling, bruising, and pain. Make sure the ice pack is waterproof so that the pressure bandage doesn t get wet.  If the wound is on the face try to sleep with your head elevated. Either in a recliner or propped up in bed, this will decrease swelling around the eyes.   You may see a small amount of drainage or blood on your pressure bandage. This is normal. However:  If drainage or bleeding continues or saturates the bandage, you will  need to apply firm pressure over the bandage with a piece of gauze for 15 minutes.  If bleeding continues after applying pressure for 15 minutes, apply an ice pack to the bandaged area for 15 minutes.  If bleeding still continues, call our office or go to the nearest emergency room.    Remove pressure dressing 48 hours after surgery:    Carefully remove the pressure bandage. If it seems sticky or too difficult to get off, you may need to soak it off in the shower.  After the pressure dressing is removed, you may shower and get the wound wet. However, Do Not let the forceful stream of the shower hit the wound directly.  Follow these wound care and dressing change instructions:    You have skin glue over the stitches. This will dry and flake off with time (about 2 weeks). If the stitches are still hanging around after 2 weeks, let us know, we can clip them out for you if they do not fall out with washing.   You may allow water to run over the site. Do not soak.  Do Not rub or scrub the site.  Pat dry after the shower or bath.  Avoid topical medications, lotions, creams, ointment,or oils.  Do not use tanning lamps or expose the site to sun.   Check wound appearance daily, some swelling and redness is normal after a procedure but should go away as your would is healing. If the swelling and redness or pain increases or if any other signs of infection occur listed below please send in a photo via my chart and or call us to let us know.  The clear glue film should start peeling and flaking off approximately around 2 weeks. By this time your wound should be sufficiently healed. If it still looks to be healing when the glue comes off you can clean the wound with soapy warm water daily in the shower. No need to bandage further, but you can put a bandage on the area daily for the two weeks if you would like.   If skin glue and sutures are still intact at 2 weeks after your procedure, you can start applying Vaseline daily to  "help soften up the skin glue. It will come off easier this way.        If you are able to take acetaminophen (\"Tylenol,\" etc.) and ibuprofen (\"Advil\" or \"Motrin,\" etc.), then you may STAGGER these medications by taking 400 mg of ibuprofen (usually two tabs) every 8 hours and 1,000 mg of acetaminophen (e.g., two tabs of extra-strength Tylenol) every 8 hours.    This means, for example, that you could take the followin,000 mg of Tylenol, followed 4 hours later by 400 mg Ibuprofen, followed 4 hours later with 1,000 mg of Tylenol, followed 4 hours later by 400 mg Ibuprofen, followed 4 hours later with 1,000 mg of Tylenol, and so forth.     Essentially, you can take either 1,000 mg of Tylenol or 400 mg of ibuprofen in alternating fashion EVERY FOUR HOURS.    Do NOT exceed more than 4,000 mg of Tylenol or 3,200 mg of ibuprofen per 24 hours. If you are not able to take Tylenol or ibuprofen as above due to other health issues (or a physician has told you directly that you are not allowed to take one of them, say due to pre-existing severe liver or kidney issues), then disregard the above directions.    Scientific evidence supports that this combination/schedule of pain medications is just as effective, if not more effective, than taking a narcotic pain medicine.       Follow up will be a 3 month scar evaluation either in person or via a telephone visit with you sending in a photo via [facility]. Unless you have been told to follow up sooner or if you have concerns and would like to be see sooner. Please call or send us in a [facility] message if possible and attach a photo.        What to expect:    The first couple of days your wound may be tender and may bleed slightly when doing wound care.  There may be swelling and bruising around the wound, especially if it is near the eyes. For your comfort, you may apply ice or cold compresses to the bruises after your have removed the pressure bandage.  The area around your wound may " be numb for several weeks or even months.  You may experience periodic sharp pain or mild itching around the wound as it heals.   The suture line will look dark for a while but will lighten over time.       When to call us:    You have bleeding that will not stop after applying pressure and ice.  You have pain that is not controlled with Tylenol (acetaminophen.)  You have signs or symptoms of an infection such as:  Fever over 100 degrees Fahrenheit  Redness, warmth or foul-smelling drainage from the wound  If you have any questions, or are not sure how to take care of the wound.    Phone numbers:    During business hours (M-F 8:00-4:30 p.m.)  [facility]-  [phone] Option 1 appt. Desk and ask for the Dermatology Surgery Team  [phone] [facility] .     ---------------------------------------------------------  Evenings/Weekends/Holidays  Hospital - [phone]   TTY for hearing mmhhnnck-[phone]  *Ask  to page dermatologist on-call  Emergency Foot-[phone]  TTY for hearing impaired- [phone]

## 2024-11-18 NOTE — NURSING NOTE
Chief Complaint   Patient presents with    Derm Problem     L upper cutaneous lip BCC     Addie HENSON RN-BSN  Dermatology Surgery

## 2024-11-18 NOTE — TELEPHONE ENCOUNTER
Follow up call completed following Mohs procedure with Dr. Garcia.       Are you having pain? managed  Are you taking pain medication? Ibuprofen   Are you applying ice?  no  Have you had any noticeable bleeding through the bandage? no   Do you have any other concerns? no       Please call [phone] if you have any questions or concerns.

## 2024-11-18 NOTE — LETTER
11/18/2024       RE: Shannan Harris  [address]  [facility] [address]     Dear Colleague,    Thank you for referring your patient, Shannan Harris, to the [facility] DERMATOLOG[facility] [place] at [facility]. Please see a copy of my visit note below.    [facility] D[facility]atologic [facility] [place] Procedure Note    Dermatology Problem List:  0. Lesion to monitor  - R nasal tip, 3mm light brown macule with inferior erythema, c/f early BCC  1. 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, s/p bx 10/10/24, s/p MMS 11/18/24  - early BCC (not biopsied), L calf, s/p Imiquimod 2/1/24  - sfBCC, R proximal anterior thigh s/p imiquimod 3/16/23  - sfBCC, L distal lateral thigh s/p imiquimod 3/16/23  - sfBCC, Right thigh s/p imiquimod 3/16/23  - superficial BCC, L ankle s/p bx 3/4/21 treated with Aldara   - superficial BCC, R anterior shin, s/p bx 3/4/21 treated with Aldara  - BCC, R medial lower leg, s/p Mohs 1/27/20  - BCC, R medial calf, s/p Mohs 1/27/20  - BCC, L shin medial, s/p ED&C 11/21/19  - BCC, L shin lateral, s/p ED&C 11/21/19  - BCC, L upper chest status post ED&C  - BCC, abdomen  - BCC, R flank  3. Tinea pedis  - Current Tx: econazole cream  4. Hx dysplastic nevus  - mod, central upper back s/p exc 8/11/16  5. AK s/p LN2    SHx: Grew up in [place]; no history of radiation; no known well-water history use.  FHx: Family with small numbers of BCCs, but not at the number she gets.    Date of Service:  Nov 18, 2024  Surgery: Mohs micrographic surgery    Case 1  Repair Type: advancement local flap  Repair Size: 2.0 cm x 2.1 cm cm  Suture Material: 4-0 Monocryl, 5-0 FAG  Tumor Type: BCC  Location: left upper cutaneous lip  Derm-Path Accession #: [id number]  PreOp Size: 1.5 cm x 0.5 cm  PostOp Size: 1.5 cm x 1.1 cm  Mohs Accession #:   Level of Defect: fascia      Procedure:  We discussed  the principles of treatment and most likely complications including scarring, bleeding, infection, swelling, pain, crusting, nerve damage, large wound,  incomplete excision, wound dehiscence,  nerve damage, recurrence, and a second procedure may be recommended to obtain the best cosmetic or functional result.    Informed consent was obtained and the patient underwent the procedure as follows:  The patient was placed supine on the operating table.  The cancer was identified, outlined with a marker, and verified by the patient.  The entire surgical field was prepped with chlorhexidine.  The surgical site was anesthetized using 3 ml of 1% lidocaine with epinephrine.    The area of clinically apparent tumor was debulked with a curete. The layer of tissue was then surgically excised using a #15 blade and was then transferred onto a specimen sheet maintaining the orientation of the specimen. Hemostasis was obtained using elecrofulgureation. The wound site was then covered with a dressing while the tissue samples were processed for examination.    The excised tissue was transported to the Mohs histology laboratory maintaining the tissue orientation.  The tissue specimen was relaxed so that the entire surgical margin was in a a single horizontal plane for sectioning and inked for precise mapping.  A precise reference map was drawn to reflect the sectioning of the specimen, colored inking of the margins, and orientation on the patient. The tissue was processed using horizontal sectioning of the base and continuous peripheral margins.  The histopathologic sections were reviewed in conjunction with the reference map.    Total blocks: 1    Total slides:  2    There were no cancer cells visualized on examination, therefore Mohs surgery was complete.     Reconstruction:  Advancement Flap    PROCEDURE:  The surgical site was anesthetized using 6 ml of 1% lidocaine with epinephrine. The wound was debeveled and undermined broadly in  all directions to the level of fat. Hemostasis was obtained using  electrocoagulation.  The advancement flap was incised to the level of fat removing a cone of redundant tissue from the left upper cutaneous lip. The flap was further undermined in all directions.    Hemostasis was again obtained.  The flap was then advanced into the defect and secured using 4-0 Monocryl buried dermal sutures.  Redundant areas of tissue were excised using the triangulation technique.  The flap wound edges of both the primary and secondary defects were then approximated with 4-0 Monocryl buried dermal sutures and the epidermis was then carefully approximated using 5-0 FAG sutures.  The wound was cleansed with saline, and ointment was applied along the wound surface.  A sterile pressure dressing of non-adherent gauze was applied and wound care instructions were given verbally and in writing.  The patient will return in seven to ten days for suture removal.  The patient left the operating suite in stable condition.  Anticipate Derma-Abrasion to be used as a second stage of this reconstruction.     Repair Size: 2.0 cm x 2.1 cm cm  Sutures Used:  4-0 Monocryl, 5-0 FAG    Scribe Disclosure:   I, Stacey Brooks, am serving as a scribe to document services personally performed by Alexander Garcia MD based on data collection and the provider's statements to me.     Provider Disclosure:  I agree with above History, Review of Systems, Physical exam and Plan. I have reviewed the content of the documentation and have edited it as needed. I have personally performed the services documented here and the documentation accurately represents those services and the decisions I have made.      Electronically signed by:  Attending attestation:  I personally performed the entire procedure.  I have reviewed the note and edited it as necessary, and agree with its contents.    Alexander Garcia M.D.  Professor  Director of Dermatologic Surgery  Department of  Dermatology  [facility]    Dermatology Surgery Clinic  [facility]  [address] [place] [id number]         Again, thank you for allowing me to participate in the care of your patient.      Sincerely,    Alexander Garcia MD

## 2024-11-18 NOTE — PROGRESS NOTES
[facility] Dermatologic Surgery Clinic [place] Procedure Note    Dermatology Problem List:  0. Lesion to monitor  - R nasal tip, 3mm light brown macule with inferior erythema, c/f early BCC  1. 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, s/p bx 10/10/24, s/p MMS 11/18/24  - early BCC (not biopsied), L calf, s/p Imiquimod 2/1/24  - sfBCC, R proximal anterior thigh s/p imiquimod 3/16/23  - sfBCC, L distal lateral thigh s/p imiquimod 3/16/23  - sfBCC, Right thigh s/p imiquimod 3/16/23  - superficial BCC, L ankle s/p bx 3/4/21 treated with Aldara   - superficial BCC, R anterior shin, s/p bx 3/4/21 treated with Aldara  - BCC, R medial lower leg, s/p Mohs 1/27/20  - BCC, R medial calf, s/p Mohs 1/27/20  - BCC, L shin medial, s/p ED&C 11/21/19  - BCC, L shin lateral, s/p ED&C 11/21/19  - BCC, L upper chest status post ED&C  - BCC, abdomen  - BCC, R flank  3. Tinea pedis  - Current Tx: econazole cream  4. Hx dysplastic nevus  - mod, central upper back s/p exc 8/11/16  5. AK s/p LN2    SHx: Grew up in [place]; no history of radiation; no known well-water history use.  FHx: Family with small numbers of BCCs, but not at the number she gets.    Date of Service:  Nov 18, 2024  Surgery: Mohs micrographic surgery    Case 1  Repair Type: advancement local flap  Repair Size: 2.0 cm x 2.1 cm cm  Suture Material: 4-0 Monocryl, 5-0 FAG  Tumor Type: BCC  Location: left upper cutaneous lip  Derm-Path Accession #: [id number]  PreOp Size: 1.5 cm x 0.5 cm  PostOp Size: 1.5 cm x 1.1 cm  Mohs Accession #:   Level of Defect: fascia      Procedure:  We discussed the principles of treatment and most likely complications including scarring, bleeding, infection, swelling, pain, crusting, nerve damage, large wound,  incomplete excision, wound dehiscence,  nerve damage, recurrence, and a second procedure may be recommended to obtain the best cosmetic or functional result.    Informed  consent was obtained and the patient underwent the procedure as follows:  The patient was placed supine on the operating table.  The cancer was identified, outlined with a marker, and verified by the patient.  The entire surgical field was prepped with chlorhexidine.  The surgical site was anesthetized using 3 ml of 1% lidocaine with epinephrine.    The area of clinically apparent tumor was debulked with a curete. The layer of tissue was then surgically excised using a #15 blade and was then transferred onto a specimen sheet maintaining the orientation of the specimen. Hemostasis was obtained using elecrofulgureation. The wound site was then covered with a dressing while the tissue samples were processed for examination.    The excised tissue was transported to the Mohs histology laboratory maintaining the tissue orientation.  The tissue specimen was relaxed so that the entire surgical margin was in a a single horizontal plane for sectioning and inked for precise mapping.  A precise reference map was drawn to reflect the sectioning of the specimen, colored inking of the margins, and orientation on the patient. The tissue was processed using horizontal sectioning of the base and continuous peripheral margins.  The histopathologic sections were reviewed in conjunction with the reference map.    Total blocks: 1    Total slides:  2    There were no cancer cells visualized on examination, therefore Mohs surgery was complete.     Reconstruction:  Advancement Flap    PROCEDURE:  The surgical site was anesthetized using 6 ml of 1% lidocaine with epinephrine. The wound was debeveled and undermined broadly in all directions to the level of fat. Hemostasis was obtained using  electrocoagulation.  The advancement flap was incised to the level of fat removing a cone of redundant tissue from the left upper cutaneous lip. The flap was further undermined in all directions.    Hemostasis was again obtained.  The flap was then  advanced into the defect and secured using 4-0 Monocryl buried dermal sutures.  Redundant areas of tissue were excised using the triangulation technique.  The flap wound edges of both the primary and secondary defects were then approximated with 4-0 Monocryl buried dermal sutures and the epidermis was then carefully approximated using 5-0 FAG sutures.  The wound was cleansed with saline, and ointment was applied along the wound surface.  A sterile pressure dressing of non-adherent gauze was applied and wound care instructions were given verbally and in writing.  The patient will return in seven to ten days for suture removal.  The patient left the operating suite in stable condition.  Anticipate Derma-Abrasion to be used as a second stage of this reconstruction.     Repair Size: 2.0 cm x 2.1 cm cm  Sutures Used:  4-0 Monocryl, 5-0 FAG    Scribe Disclosure:   I, Stacey Brooks, am serving as a scribe to document services personally performed by Alexander Garcia MD based on data collection and the provider's statements to me.     Provider Disclosure:  I agree with above History, Review of Systems, Physical exam and Plan. I have reviewed the content of the documentation and have edited it as needed. I have personally performed the services documented here and the documentation accurately represents those services and the decisions I have made.      Electronically signed by:  Attending attestation:  I personally performed the entire procedure.  I have reviewed the note and edited it as necessary, and agree with its contents.    Alexander Garcia M.D.  Professor  Director of Dermatologic Surgery  Department of Dermatology  [facility]    Dermatology Surgery Clinic  [facility] [place] [id number]

## 2024-11-25 DIAGNOSIS — K21.9 GASTROESOPHAGEAL REFLUX DISEASE WITHOUT ESOPHAGITIS: ICD-10-CM

## 2024-11-25 DIAGNOSIS — K21.00 GASTROESOPHAGEAL REFLUX DISEASE WITH ESOPHAGITIS WITHOUT HEMORRHAGE: ICD-10-CM

## 2024-11-25 NOTE — TELEPHONE ENCOUNTER
Refill request for omeprazole received. Pt last seen in clinic 4/25/24 with instructions to return in 1 year. Refilled medication per protocol.

## 2024-11-26 DIAGNOSIS — K21.9 GASTROESOPHAGEAL REFLUX DISEASE WITHOUT ESOPHAGITIS: ICD-10-CM

## 2024-11-26 RX ORDER — FAMOTIDINE 40 MG/1
40 TABLET, FILM COATED ORAL AT BEDTIME
Qty: 90 TABLET | Refills: 1 | Status: SHIPPED | OUTPATIENT
Start: 2024-11-26

## 2024-12-03 ENCOUNTER — ALLIED HEALTH/NURSE VISIT (OUTPATIENT)
Dept: DERMATOLOGY | Facility: CLINIC | Age: 54
End: 2024-12-03
Payer: COMMERCIAL

## 2024-12-03 DIAGNOSIS — C44.01 BASAL CELL CARCINOMA (BCC) OF UPPER LIP: Primary | ICD-10-CM

## 2024-12-03 PROCEDURE — 99207 PR NO CHARGE NURSE ONLY: CPT | Performed by: DERMATOLOGY

## 2024-12-03 NOTE — NURSING NOTE
Chief Complaint   Patient presents with    RECHECK     Suture removal L upper lip      Addie HENSON RN-BSN  Dermatology Surgery      Shannan Harris comes into clinic today at the request of Dr. Garcia Ordering Provider for suture removal.    Incision well approximated. No s/sx of infection present today. Sutures removed. Photo taken.     This service provided today was under the supervising provider of the day Dr. Garcia, who was available if needed.    Addie Hills RN

## 2024-12-05 ENCOUNTER — TELEPHONE (OUTPATIENT)
Dept: DERMATOLOGY | Facility: CLINIC | Age: 54
End: 2024-12-05
Payer: COMMERCIAL

## 2024-12-05 NOTE — TELEPHONE ENCOUNTER
Addie Hills RN Siegfried, Marta Hey! Can you reach out to schedule 3 month scar eval for left upper lip? Thank ya!!    Jose Dietrich  12/5/2024 4:03 PM

## 2025-01-05 ENCOUNTER — HEALTH MAINTENANCE LETTER (OUTPATIENT)
Age: 55
End: 2025-01-05

## 2025-01-09 ENCOUNTER — TELEPHONE (OUTPATIENT)
Dept: DERMATOLOGY | Facility: CLINIC | Age: 55
End: 2025-01-09
Payer: COMMERCIAL

## 2025-01-09 NOTE — TELEPHONE ENCOUNTER
Shannan called to schedule a follow up. Concern for bump in scar.     Zenaida Gutierrez, Complex  1/9/2025 9:59 AM

## 2025-01-15 ENCOUNTER — OFFICE VISIT (OUTPATIENT)
Dept: DERMATOLOGY | Facility: CLINIC | Age: 55
End: 2025-01-15
Payer: COMMERCIAL

## 2025-01-15 DIAGNOSIS — Z85.828 HISTORY OF NONMELANOMA SKIN CANCER: Primary | ICD-10-CM

## 2025-01-15 DIAGNOSIS — Z48.89 ENCOUNTER FOR POSTOPERATIVE WOUND CHECK: ICD-10-CM

## 2025-01-15 PROCEDURE — 99024 POSTOP FOLLOW-UP VISIT: CPT | Mod: GC | Performed by: DERMATOLOGY

## 2025-01-15 NOTE — LETTER
1/15/2025       RE: Shannan Harris  [address]  [facility] [address]     Dear Colleague,    Thank you for referring your patient, Shannan Harris, to the [facility] DERMATOLOGIC SURGERY CLINIC [place] at [facility]. Please see a copy of my visit note below.    Dermatologic Surgery Post-Op Wound Check     CC: Derm Problem (2 month scar eval - L upper lip)      Dermatology Problem List:  0. Lesion to monitor  - R nasal tip, 3mm light brown macule with inferior erythema, c/f early BCC  1. 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, s/p bx 10/10/24, s/p MMS 11/18/24  - early BCC (not biopsied), L calf, s/p Imiquimod 2/1/24  - sfBCC, R proximal anterior thigh s/p imiquimod 3/16/23  - sfBCC, L distal lateral thigh s/p imiquimod 3/16/23  - sfBCC, Right thigh s/p imiquimod 3/16/23  - superficial BCC, L ankle s/p bx 3/4/21 treated with Aldara   - superficial BCC, R anterior shin, s/p bx 3/4/21 treated with Aldara  - BCC, R medial lower leg, s/p Mohs 1/27/20  - BCC, R medial calf, s/p Mohs 1/27/20  - BCC, L shin medial, s/p ED&C 11/21/19  - BCC, L shin lateral, s/p ED&C 11/21/19  - BCC, L upper chest status post ED&C  - BCC, abdomen  - BCC, R flank  3. Tinea pedis  - Current Tx: econazole cream  4. Hx dysplastic nevus  - mod, central upper back s/p exc 8/11/16  5. AK s/p LN2     SHx: Grew up in [place]; no history of radiation; no known well-water history use.  FHx: Family with small numbers of BCCs, but not at the number she gets.    Subjective: Shannan Harris is a 54 year old female who presents today for wound check after 11/18/24 MMS with advancement flap repair of BCC on the left upper cutaneous lip.   - feels inflamed  - pt thought she has a cold sore and used acyclovir to treat  - feels better now but adds that it is still painful  - notes whitish discharge from the lip   - was previously using ScarAway but it  wasn't very helpful  - no other concerns today    Objective: An exam of the lips was performed today   - slight erythema along the superior scar line   - inferior portion slight nodule  - The surgical site noted above is clean, dry, and intact. There is no surrounding erythema, purulence, or significant tenderness to palpation. No clinical evidence of infection noted today.    Assessment and Plan:     1. BCC, left upper cutaneous lip, s/p bx 10/10/24, s/p MMS 11/18/24   - discussed ILK injections to speed up the dissolving of the sutures and decrease the inflammation  - discussed PDL for the redness. Will defer for next time.   - The patient's surgery site(s) is/are healing very well. No evidence of infection on examination today.  - The patient was told to continue with wound cares until the area(s) is/are no longer crusted.   - The patient should follow up with dermatologic surgery PRN, as well as continue with regular skin exams in general dermatology clinic.    Patient was discussed with and evaluated by attending physician Dr.Ian Jose MD.    Staff Involved:   Scribe/Fellow/Staff     Scribe Disclosure:   I, AMBREEN GONZALEZ, am serving as a scribe; to document services personally performed by Alexander Garcia MD -based on data collection and the provider's statements to me.    Toño Herron MD               Attestation signed by Alexander Garcia MD at 1/20/2025  3:36 PM:  Attending Attestation  I attest that the Fellow recorded the interview and exam that I personally performed.  I have reviewed the note and edited it as necessary.    Alexander Garcia M.D.  Professor  Director of Dermatologic Surgery  Department of Dermatology  [facility]      Again, thank you for allowing me to participate in the care of your patient.      Sincerely,    Alexander Garcia MD

## 2025-01-15 NOTE — PROGRESS NOTES
Dermatologic Surgery Post-Op Wound Check     CC: Derm Problem (2 month scar eval - L upper lip)      Dermatology Problem List:  0. Lesion to monitor  - R nasal tip, 3mm light brown macule with inferior erythema, c/f early BCC  1. 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, s/p bx 10/10/24, s/p MMS 11/18/24  - early BCC (not biopsied), L calf, s/p Imiquimod 2/1/24  - sfBCC, R proximal anterior thigh s/p imiquimod 3/16/23  - sfBCC, L distal lateral thigh s/p imiquimod 3/16/23  - sfBCC, Right thigh s/p imiquimod 3/16/23  - superficial BCC, L ankle s/p bx 3/4/21 treated with Aldara   - superficial BCC, R anterior shin, s/p bx 3/4/21 treated with Aldara  - BCC, R medial lower leg, s/p Mohs 1/27/20  - BCC, R medial calf, s/p Mohs 1/27/20  - BCC, L shin medial, s/p ED&C 11/21/19  - BCC, L shin lateral, s/p ED&C 11/21/19  - BCC, L upper chest status post ED&C  - BCC, abdomen  - BCC, R flank  3. Tinea pedis  - Current Tx: econazole cream  4. Hx dysplastic nevus  - mod, central upper back s/p exc 8/11/16  5. AK s/p LN2     SHx: Grew up in [place]; no history of radiation; no known well-water history use.  FHx: Family with small numbers of BCCs, but not at the number she gets.    Subjective: Shannan Harris is a 54 year old female who presents today for wound check after 11/18/24 MMS with advancement flap repair of BCC on the left upper cutaneous lip.   - feels inflamed  - pt thought she has a cold sore and used acyclovir to treat  - feels better now but adds that it is still painful  - notes whitish discharge from the lip   - was previously using ScarAway but it wasn't very helpful  - no other concerns today    Objective: An exam of the lips was performed today   - slight erythema along the superior scar line   - inferior portion slight nodule  - The surgical site noted above is clean, dry, and intact. There is no surrounding erythema, purulence, or significant tenderness to  palpation. No clinical evidence of infection noted today.    Assessment and Plan:     1. BCC, left upper cutaneous lip, s/p bx 10/10/24, s/p MMS 11/18/24   - discussed ILK injections to speed up the dissolving of the sutures and decrease the inflammation  - discussed PDL for the redness. Will defer for next time.   - The patient's surgery site(s) is/are healing very well. No evidence of infection on examination today.  - The patient was told to continue with wound cares until the area(s) is/are no longer crusted.   - The patient should follow up with dermatologic surgery PRN, as well as continue with regular skin exams in general dermatology clinic.    Patient was discussed with and evaluated by attending physician Dr.Ian Jose MD.    Staff Involved:   Scribe/Fellow/Staff     Scribe Disclosure:   AMBREEN CONRAD, am serving as a scribe; to document services personally performed by Alexander Garcia MD -based on data collection and the provider's statements to me.    Toño Herron MD

## 2025-01-15 NOTE — NURSING NOTE
Chief Complaint   Patient presents with    Derm Problem     2 month scar eval - L upper lip     Kanika BAR RN  Dermatology Surgery

## 2025-02-11 NOTE — PROGRESS NOTES
Dermatologic Surgery Post-Op Scar Check     CC: RECHECK (1 month follow up)      Dermatology Problem List:  0. Lesion to monitor  - R nasal tip, 3mm light brown macule with inferior erythema, c/f early BCC  1. 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, s/p bx 10/10/24, s/p MMS 11/18/24   - ILK-20 0.2 ml 02/12/25  - early BCC (not biopsied), L calf, s/p Imiquimod 2/1/24  - sfBCC, R proximal anterior thigh s/p imiquimod 3/16/23  - sfBCC, L distal lateral thigh s/p imiquimod 3/16/23  - sfBCC, Right thigh s/p imiquimod 3/16/23  - superficial BCC, L ankle s/p bx 3/4/21 treated with Aldara   - superficial BCC, R anterior shin, s/p bx 3/4/21 treated with Aldara  - BCC, R medial lower leg, s/p Mohs 1/27/20  - BCC, R medial calf, s/p Mohs 1/27/20  - BCC, L shin medial, s/p ED&C 11/21/19  - BCC, L shin lateral, s/p ED&C 11/21/19  - BCC, L upper chest status post ED&C  - BCC, abdomen  - BCC, R flank  3. Tinea pedis  - Current Tx: econazole cream  4. Hx dysplastic nevus  - mod, central upper back s/p exc 8/11/16  5. AK s/p LN2     SHx: Grew up in [place]; no history of radiation; no known well-water history use.  FHx: Family with small numbers of BCCs, but not at the number she gets.    Subjective: Shannan Harris is a 55 year old female who presents today for scar evaluation after 11/18/24 MMS with advancement flap repair of BCC on the left upper cutaneous lip.   - Today, she feels that her postsurgical scar is healing well. She feels intermittent pressure and occasionally a sharp pain at the scar site  - no other concerns today    Objective: An exam of the left upper cutaneous lip was performed today.  - Well-approximated scar on the left upper cutaneous lip. Slight hypertrophy and redness at the superior portion of the postsurgical scar.      Assessment and Plan:     1. S/p 11/18/24 MMS with advancement flap repair of BCC on the left upper cutaneous lip.   - Surgical site healed  well.  - ILK injection done today, see procedure note below.  - Plan for PDL laser treatment as scheduled.  - The patient should follow up with dermatologic surgery PRN, as well as continue with regular skin exams in general dermatology clinic.    Separate Procedure on Date of Encounter:  Kenalog intralesional injection procedure note: After verbal consent and discussion of risks including but not limited to atrophy, pain, and bruising, time out was performed, the patient underwent positioning and the area was prepped with isopropyl alcohol, 0.2 total cc of Kenalog 20 mg/cc was injected into 1 sites on the left upper cutaneous lip.  The patient tolerated the procedure well and left the Dermatology clinic in good condition.      Patient was discussed with and evaluated by attending physician Dr. Alexander Garcia and Dr. Toño Herron, MDSO fellow, PGY5.    Staff Involved:  Staff/Scribe/Fellow    Scribe Disclosure:   I, Stephania Saleh, am serving as a scribe; to document services personally performed by Alexander Garcia MD, based on data collection and the provider's statements to me.     Provider Disclosure:  I agree with the above history, review of systems, physical exam and plan.  I have reviewed the content of the documentation and have edited it as needed. I have personally performed the services documented here and the documentation accurately represents those services and the decisions I have made.      Electronically signed by:    Attending attestation:  I was present for key elements of the procedure and immediately available for all other portions of the procedure.  I have reviewed the note and edited it as necessary.    Alexander Garcia M.D.  Professor  Director of Dermatologic Surgery  Department of Dermatology  [facility]    Dermatology Surgery Clinic  [facility] [place] [phone]

## 2025-02-12 ENCOUNTER — OFFICE VISIT (OUTPATIENT)
Dept: DERMATOLOGY | Facility: CLINIC | Age: 55
End: 2025-02-12
Payer: COMMERCIAL

## 2025-02-12 DIAGNOSIS — Z48.89 ENCOUNTER FOR POSTOPERATIVE CARE: Primary | ICD-10-CM

## 2025-02-12 DIAGNOSIS — L90.5 POSTOPERATIVE SCAR: ICD-10-CM

## 2025-02-12 DIAGNOSIS — Z85.828 HISTORY OF NONMELANOMA SKIN CANCER: ICD-10-CM

## 2025-02-12 DIAGNOSIS — Z98.890 POSTOPERATIVE SCAR: ICD-10-CM

## 2025-02-12 DIAGNOSIS — C44.91 SUPERFICIAL BASAL CELL CARCINOMA: ICD-10-CM

## 2025-02-12 PROCEDURE — 11900 INJECT SKIN LESIONS </W 7: CPT | Performed by: DERMATOLOGY

## 2025-02-12 RX ORDER — TRIAMCINOLONE ACETONIDE 40 MG/ML
12 INJECTION, SUSPENSION INTRA-ARTICULAR; INTRAMUSCULAR ONCE
Status: COMPLETED | OUTPATIENT
Start: 2025-02-12 | End: 2025-02-12

## 2025-02-12 RX ORDER — NIACINAMIDE 500 MG
500 TABLET ORAL 2 TIMES DAILY WITH MEALS
Qty: 90 TABLET | Refills: 6 | Status: SHIPPED | OUTPATIENT
Start: 2025-02-12

## 2025-02-12 RX ADMIN — TRIAMCINOLONE ACETONIDE 6 MG: 40 INJECTION, SUSPENSION INTRA-ARTICULAR; INTRAMUSCULAR at 14:45

## 2025-02-12 NOTE — NURSING NOTE
Drug Administration Record    Prior to injection, verified patient identity using patient's name and date of birth.  Due to injection administration, patient instructed to remain in clinic for 15 minutes  afterwards, and to report any adverse reaction to me immediately.    Drug Name: triamcinolone acetonide(kenalog)  Dose: 0.3mL of triamcinolone 20mg/mL, 6mg dose  Route administered: ID  NDC #: [id number]  Amount of waste(mL):0.85ml  Reason for waste: Single use vial    LOT #: [id number]  SITE: see note  : amneal phrma  EXPIRATION DATE: 09/2026

## 2025-02-12 NOTE — NURSING NOTE
Chief Complaint   Patient presents with    RECHECK     1 month follow up   RUDY Real-BSN  Dermatology Surgery

## 2025-02-12 NOTE — LETTER
2/12/2025       RE: Shannan Harris  [address]  [facility] [address]     Dear Colleague,    Thank you for referring your patient, Shannan Harris, to the [facility] DERMATOLOGIC SURGERY CLINIC [place] at [facility]. Please see a copy of my visit note below.    Dermatologic Surgery Post-Op Scar Check     CC: RECHECK (1 month follow up)      Dermatology Problem List:  0. Lesion to monitor  - R nasal tip, 3mm light brown macule with inferior erythema, c/f early BCC  1. 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, s/p bx 10/10/24, s/p MMS 11/18/24   - ILK-20 0.2 ml 02/12/25  - early BCC (not biopsied), L calf, s/p Imiquimod 2/1/24  - sfBCC, R proximal anterior thigh s/p imiquimod 3/16/23  - sfBCC, L distal lateral thigh s/p imiquimod 3/16/23  - sfBCC, Right thigh s/p imiquimod 3/16/23  - superficial BCC, L ankle s/p bx 3/4/21 treated with Aldara   - superficial BCC, R anterior shin, s/p bx 3/4/21 treated with Aldara  - BCC, R medial lower leg, s/p Mohs 1/27/20  - BCC, R medial calf, s/p Mohs 1/27/20  - BCC, L shin medial, s/p ED&C 11/21/19  - BCC, L shin lateral, s/p ED&C 11/21/19  - BCC, L upper chest status post ED&C  - BCC, abdomen  - BCC, R flank  3. Tinea pedis  - Current Tx: econazole cream  4. Hx dysplastic nevus  - mod, central upper back s/p exc 8/11/16  5. AK s/p LN2     SHx: Grew up in [place]; no history of radiation; no known well-water history use.  FHx: Family with small numbers of BCCs, but not at the number she gets.    Subjective: Shannan Harris is a 55 year old female who presents today for scar evaluation after 11/18/24 MMS with advancement flap repair of BCC on the left upper cutaneous lip.   - Today, she feels that her postsurgical scar is healing well. She feels intermittent pressure and occasionally a sharp pain at the scar site  - no other concerns today    Objective: An exam of the left  upper cutaneous lip was performed today.  - Well-approximated scar on the left upper cutaneous lip. Slight hypertrophy and redness at the superior portion of the postsurgical scar.      Assessment and Plan:     1. S/p 11/18/24 MMS with advancement flap repair of BCC on the left upper cutaneous lip.   - Surgical site healed well.  - ILK injection done today, see procedure note below.  - Plan for PDL laser treatment as scheduled.  - The patient should follow up with dermatologic surgery PRN, as well as continue with regular skin exams in general dermatology clinic.    Separate Procedure on Date of Encounter:  Kenalog intralesional injection procedure note: After verbal consent and discussion of risks including but not limited to atrophy, pain, and bruising, time out was performed, the patient underwent positioning and the area was prepped with isopropyl alcohol, 0.2 total cc of Kenalog 20 mg/cc was injected into 1 sites on the left upper cutaneous lip.  The patient tolerated the procedure well and left the Dermatology clinic in good condition.      Patient was discussed with and evaluated by attending physician Dr. Alexander Garcia and Dr. Toño Herron, MDSO fellow, PGY5.    Staff Involved:  Staff/Scribe/Fellow    Scribe Disclosure:   I, Stephania Saleh, am serving as a scribe; to document services personally performed by Alexander Garcia MD, based on data collection and the provider's statements to me.     Provider Disclosure:  I agree with the above history, review of systems, physical exam and plan.  I have reviewed the content of the documentation and have edited it as needed. I have personally performed the services documented here and the documentation accurately represents those services and the decisions I have made.      Electronically signed by:    Attending attestation:  I was present for key elements of the procedure and immediately available for all other portions of the procedure.  I have reviewed the note and  edited it as necessary.    Alexander Garcia M.D.  Professor  Director of Dermatologic Surgery  Department of Dermatology  [facility]    Dermatology Surgery Clinic  [facility] [place] [address]        Again, thank you for allowing me to participate in the care of your patient.      Sincerely,    Alexander Garcia MD

## 2025-03-03 ENCOUNTER — TELEPHONE (OUTPATIENT)
Dept: OBGYN | Facility: CLINIC | Age: 55
End: 2025-03-03
Payer: COMMERCIAL

## 2025-03-03 DIAGNOSIS — J34.2 DEVIATED NASAL SEPTUM: Primary | ICD-10-CM

## 2025-03-03 DIAGNOSIS — J34.3 HYPERTROPHY OF BOTH INFERIOR NASAL TURBINATES: ICD-10-CM

## 2025-03-03 NOTE — TELEPHONE ENCOUNTER
[facility] Call Center    Phone Message    May a detailed message be left on voicemail: yes     Reason for Call: Pt requesting referral to see Dr Quevedo, head and neck surgery - has had appts with him in the past and he suggested surgery so she'd like to go back to see him and they're telling her since it's been over 3 years she's a new pt again so needing referral  Please call pt once sent.  Thank you    Action Taken: Message routed to:  Other: YOVANY NOEL    Travel Screening: Not Applicable     Date of Service:

## 2025-03-04 ENCOUNTER — TELEPHONE (OUTPATIENT)
Dept: OBGYN | Facility: CLINIC | Age: 55
End: 2025-03-04
Payer: COMMERCIAL

## 2025-03-04 NOTE — TELEPHONE ENCOUNTER
I placed ENT referral but I haven't seen pt since 12/2022 - would recommend follow-up visit to discuss lipids per Dr. Blanton's last labs/evaluation.

## 2025-03-04 NOTE — CONFIDENTIAL NOTE
Saw Dr Garcia with Otolaryngology a couple of years ago.  He recommended surgery for deviated septum and hypertrophy of nasal turbinates.      She cancelled/postponed surgery due to anxiety and scheduling conflicts.      Recently, her dog bumped her nose, and she is more aware of the pain in her nose and face, and feels that her nostrils are blocked.  She wishes to revisit surgical repair.    Pended referral to Dr Duong

## 2025-03-04 NOTE — TELEPHONE ENCOUNTER
[facility] Call Center    Phone Message    May a detailed message be left on voicemail: yes     Reason for Call: Other: Patient returning missed call from clinic. Writer sent secure chat to clinic- sending TE to clinic for call back- [phone]     Action Taken: Message routed to:  Other: S OB/GYN    Travel Screening: Not Applicable     Date of Service:

## 2025-03-04 NOTE — TELEPHONE ENCOUNTER
Tried to reach Shannan, but received voicemail.  Left message to call back to further discuss referral and current symptoms.  Was seen by Dr Duong 12/2022

## 2025-03-04 NOTE — TELEPHONE ENCOUNTER
Tried to reach Shannan, but received voicemail.  Left message that her referral was placed and advising her to schedule follow up with Dr Duong to discuss her elevated cholesterol labs from last year.

## 2025-03-12 ENCOUNTER — LAB (OUTPATIENT)
Dept: LAB | Facility: CLINIC | Age: 55
End: 2025-03-12
Payer: COMMERCIAL

## 2025-03-12 DIAGNOSIS — Z00.00 PREVENTATIVE HEALTH CARE: ICD-10-CM

## 2025-03-12 DIAGNOSIS — Z13.29 SCREENING FOR THYROID DISORDER: ICD-10-CM

## 2025-03-12 DIAGNOSIS — Z13.1 SCREENING FOR DIABETES MELLITUS: ICD-10-CM

## 2025-03-12 DIAGNOSIS — Z13.220 SCREENING FOR LIPID DISORDERS: ICD-10-CM

## 2025-03-12 LAB
ALBUMIN SERPL BCG-MCNC: 4.7 G/DL (ref 3.5–5.2)
ALP SERPL-CCNC: 158 U/L (ref 40–150)
ALT SERPL W P-5'-P-CCNC: 70 U/L (ref 0–50)
AST SERPL W P-5'-P-CCNC: 47 U/L (ref 0–45)
BILIRUB DIRECT SERPL-MCNC: 0.14 MG/DL (ref 0–0.3)
BILIRUB SERPL-MCNC: 0.3 MG/DL
CHOLEST SERPL-MCNC: 317 MG/DL
EST. AVERAGE GLUCOSE BLD GHB EST-MCNC: 111 MG/DL
FASTING STATUS PATIENT QL REPORTED: YES
HBA1C MFR BLD: 5.5 %
HDLC SERPL-MCNC: 83 MG/DL
LDLC SERPL CALC-MCNC: 212 MG/DL
NONHDLC SERPL-MCNC: 234 MG/DL
PROT SERPL-MCNC: 7.3 G/DL (ref 6.4–8.3)
TRIGL SERPL-MCNC: 109 MG/DL
TSH SERPL DL<=0.005 MIU/L-ACNC: 2.15 UIU/ML (ref 0.3–4.2)

## 2025-03-12 PROCEDURE — 84443 ASSAY THYROID STIM HORMONE: CPT | Performed by: PATHOLOGY

## 2025-03-12 PROCEDURE — 99000 SPECIMEN HANDLING OFFICE-LAB: CPT | Performed by: PATHOLOGY

## 2025-03-12 PROCEDURE — 83036 HEMOGLOBIN GLYCOSYLATED A1C: CPT | Performed by: OBSTETRICS & GYNECOLOGY

## 2025-03-12 PROCEDURE — 36415 COLL VENOUS BLD VENIPUNCTURE: CPT | Performed by: PATHOLOGY

## 2025-03-12 PROCEDURE — 80076 HEPATIC FUNCTION PANEL: CPT | Performed by: PATHOLOGY

## 2025-03-12 PROCEDURE — 80061 LIPID PANEL: CPT | Performed by: PATHOLOGY

## 2025-03-17 ENCOUNTER — OFFICE VISIT (OUTPATIENT)
Dept: OTOLARYNGOLOGY | Facility: CLINIC | Age: 55
End: 2025-03-17
Attending: FAMILY MEDICINE
Payer: COMMERCIAL

## 2025-03-17 ENCOUNTER — PREP FOR PROCEDURE (OUTPATIENT)
Dept: OTOLARYNGOLOGY | Facility: CLINIC | Age: 55
End: 2025-03-17

## 2025-03-17 VITALS
BODY MASS INDEX: 26.07 KG/M2 | HEIGHT: 62 IN | SYSTOLIC BLOOD PRESSURE: 110 MMHG | WEIGHT: 141.7 LBS | TEMPERATURE: 96.8 F | DIASTOLIC BLOOD PRESSURE: 74 MMHG | HEART RATE: 99 BPM

## 2025-03-17 DIAGNOSIS — J34.829 NASAL VALVE COLLAPSE: ICD-10-CM

## 2025-03-17 DIAGNOSIS — J34.2 DEVIATED NASAL SEPTUM: ICD-10-CM

## 2025-03-17 DIAGNOSIS — J34.3 HYPERTROPHY OF BOTH INFERIOR NASAL TURBINATES: ICD-10-CM

## 2025-03-17 DIAGNOSIS — J34.829 NASAL VALVE COLLAPSE: Primary | ICD-10-CM

## 2025-03-17 DIAGNOSIS — J34.89 NASAL OBSTRUCTION: Primary | ICD-10-CM

## 2025-03-17 PROCEDURE — 99204 OFFICE O/P NEW MOD 45 MIN: CPT | Performed by: OTOLARYNGOLOGY

## 2025-03-17 PROCEDURE — 1126F AMNT PAIN NOTED NONE PRSNT: CPT | Performed by: OTOLARYNGOLOGY

## 2025-03-17 PROCEDURE — 3074F SYST BP LT 130 MM HG: CPT | Performed by: OTOLARYNGOLOGY

## 2025-03-17 PROCEDURE — 3078F DIAST BP <80 MM HG: CPT | Performed by: OTOLARYNGOLOGY

## 2025-03-17 ASSESSMENT — PAIN SCALES - GENERAL: PAINLEVEL_OUTOF10: NO PAIN (0)

## 2025-03-17 NOTE — LETTER
3/17/2025      Shannan Harris  [address]  [facility] [address]      Dear Colleague,    Thank you for referring your patient, Shannan Harris, to the [facility]. Please see a copy of my visit note below.    ENT Consultation    Shannan Harris who is a 55 year old female seen in consultation at the request of Dr. Ginna Duong.      History of Present Illness - Shannan Harris is a 55 year old female presents for evaluation of nasal obstruction.  In the past was seen for nasal obstruction due to deviated septum and enlarged turbinates.  She has had nasal trauma when she was much younger.  She decided at that time to wait and see how she does before committing to any surgical intervention.  However since had a minor trauma to her nose on the left side.  Physically feels that she might have a little bit of defect in the area of the upper lateral cartilage above the ala on the left side.  She definitely feels that her nasal breathing on the left which was always significantly better than the right has been impaired.  This has happened over a month ago.  Sense of smell and taste appear to be intact.  No extraordinary discharge noted.        BP Readings from Last 1 Encounters:   03/17/25 110/74       BP noted to be well controlled today in office.     Shannan IS NOT a smoker/uses chewing tobacco.  Shannan       Past Medical History -   Past Medical History:   Diagnosis Date     Gastroesophageal reflux disease      History of basal cell carcinoma      Hot flashes      Nondisplaced fracture of fifth left metatarsal bone with routine healing 09/12/2016     Pure hypercholesterolemia 12/2/2022       Current Medications -   Current Outpatient Medications:      estradiol (VIVELLE-DOT) 0.025 MG/24HR bi-weekly patch, Place 1 patch onto the skin twice a week, Disp: 24 patch, Rfl: 3     famotidine (PEPCID) 40 MG tablet, Take 1 tablet (40 mg) by mouth at bedtime., Disp: 90 tablet, Rfl: 1     imiquimod  (ALDARA) 5 % external cream, Apply once daily 5 days per week, for 6 weeks to area on left posterior calf (Patient not taking: Reported on 3/17/2025), Disp: 24 packet, Rfl: 2     niacinamide 500 MG tablet, Take 1 tablet (500 mg) by mouth 2 times daily (with meals). One pill twice daily, Disp: 90 tablet, Rfl: 6     omeprazole (PRILOSEC) 20 MG DR capsule, Take 1 capsule (20 mg) by mouth daily as needed (heartburn)., Disp: 90 capsule, Rfl: 1    Current Facility-Administered Medications:      lidocaine 1% with EPINEPHrine 1:100,000 injection 3 mL, 3 mL, Intradermal, Once, Cuate Ambrocio MD    Allergies - No Known Allergies    Social History -   Social History     Socioeconomic History     Marital status:      Number of children: 0   Occupational History     Occupation: research     Employer: [employer]   Tobacco Use     Smoking status: Never     Smokeless tobacco: Never   Vaping Use     Vaping status: Never Used   Substance and Sexual Activity     Alcohol use: No     Alcohol/week: 0.0 standard drinks of alcohol     Drug use: No     Sexual activity: Yes     Partners: Male     Birth control/protection: Surgical     Comment: st   Social History Narrative    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    One son, born by surrogacy in [place] in 2014.    Getting  in June 2017.       Family History -   Family History   Problem Relation Age of Onset     Skin Cancer Mother         BCC     Hypertension Mother      Hyperlipidemia Mother      Gout Mother      Heart Disease Father      Skin Cancer Maternal Grandmother      Skin Cancer Brother      Cancer Brother         BCC     Brain Cancer Paternal Uncle      Pancreatic Cancer Paternal Uncle      Skin Cancer Maternal Cousin      Cancer - colorectal No family hx of      Breast Cancer No family hx of      Melanoma No family hx of        Review of Systems - As per HPI and PMHx, otherwise review of system review of the head and neck negative. Otherwise 10+ review  "of system is negative    Physical Exam  /74 (BP Location: Right arm, Patient Position: Sitting, Cuff Size: Adult Regular)   Pulse 99   Temp 96.8  F (36  C) (Temporal)   Ht 1.575 m (5' 2\")   Wt 64.3 kg (141 lb 11.2 oz)   BMI 25.92 kg/m    BMI: Body mass index is 25.92 kg/m .    General - The patient is well nourished and well developed, and appears to have good nutritional status.  Alert and oriented to person and place, answers questions and cooperates with examination appropriately.    SKIN - No suspicious lesions or rashes.  Respiration - No respiratory distress.  Head and Face - Normocephalic and atraumatic, with no gross asymmetry noted of the contour of the facial features.  The facial nerve is intact, with strong symmetric movements.    Voice and Breathing - The patient was breathing comfortably without the use of accessory muscles. The patients voice was clear and strong, and had appropriate pitch and quality.    Ears - Bilateral pinna and EACs with normal appearing overlying skin. Tympanic membrane intact with good mobility on pneumatic otoscopy bilaterally. Bony landmarks of the ossicular chain are normal. The tympanic membranes are normal in appearance. No retraction, perforation, or masses.  No fluid or purulence was seen in the external canal or the middle ear.     Eyes - Extraocular movements intact.  Sclera were not icteric or injected, conjunctiva were pink and moist.    Mouth - Examination of the oral cavity showed pink, healthy oral mucosa. No lesions or ulcerations noted.  The tongue was mobile and midline, and the dentition were in good condition.      Throat - The walls of the oropharynx were smooth, pink, moist, symmetric, and had no lesions or ulcerations.  The tonsillar pillars and soft palate were symmetric.  The uvula was midline on elevation.    Neck - Normal midline excursion of the laryngotracheal complex during swallowing.  Full range of motion on passive movement.  Palpation " of the occipital, submental, submandibular, internal jugular chain, and supraclavicular nodes did not demonstrate any abnormal lymph nodes or masses.  The carotid pulse was palpable bilaterally.  Palpation of the thyroid was soft and smooth, with no nodules or goiter appreciated.  The trachea was mobile and midline.    Nose - External contour is asymmetric, some deficiency in the inferior aspect of the upper lateral cartilage on the left side.  Nasal mucosa is pink and moist with no abnormal mucus.  The septum was very deviated to the right and obstructive, turbinates of large size and position.  No polyps, masses, or purulence noted on examination.  There was significant nasal valve collapse on the left internally.  Modified Hussein maneuver was instrumental in improving nasal breathing on the left side.  Neuro - Nonfocal neuro exam is normal, CN 2 through 12 intact, normal gait and muscle tone.          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  This could have been posttraumatic event.  We discussed different options at this point and patient is very interested in going ahead with septoplasty submucous resection of turbinates as well as internal nasal valve reconstruction using autologous batten graft.We discuss risks and possible complication of septoplasty including septal perforation, bleeding(that may require packing), infection, loss of smell, stenosis, CSF rhinorrhea.  Also possibly somatic effect of implanted cartilage is discussed.  With this knowledge the patient wishes to proceed with the surgery.       Dionte Quevedo MD       Again, thank you for allowing me to participate in the care of your patient.        Sincerely,        Dionte Quevedo MD, MD    Electronically signed

## 2025-03-17 NOTE — PROGRESS NOTES
ENT Consultation    Shannan Harris who is a 55 year old female seen in consultation at the request of Dr. iGnna Duong.      History of Present Illness - Shannan Harris is a 55 year old female presents for evaluation of nasal obstruction.  In the past was seen for nasal obstruction due to deviated septum and enlarged turbinates.  She has had nasal trauma when she was much younger.  She decided at that time to wait and see how she does before committing to any surgical intervention.  However since had a minor trauma to her nose on the left side.  Physically feels that she might have a little bit of defect in the area of the upper lateral cartilage above the ala on the left side.  She definitely feels that her nasal breathing on the left which was always significantly better than the right has been impaired.  This has happened over a month ago.  Sense of smell and taste appear to be intact.  No extraordinary discharge noted.        BP Readings from Last 1 Encounters:   03/17/25 110/74       BP noted to be well controlled today in office.     Shannan IS NOT a smoker/uses chewing tobacco.  Shannan       Past Medical History -   Past Medical History:   Diagnosis Date    Gastroesophageal reflux disease     History of basal cell carcinoma     Hot flashes     Nondisplaced fracture of fifth left metatarsal bone with routine healing 09/12/2016    Pure hypercholesterolemia 12/2/2022       Current Medications -   Current Outpatient Medications:     estradiol (VIVELLE-DOT) 0.025 MG/24HR bi-weekly patch, Place 1 patch onto the skin twice a week, Disp: 24 patch, Rfl: 3    famotidine (PEPCID) 40 MG tablet, Take 1 tablet (40 mg) by mouth at bedtime., Disp: 90 tablet, Rfl: 1    imiquimod (ALDARA) 5 % external cream, Apply once daily 5 days per week, for 6 weeks to area on left posterior calf (Patient not taking: Reported on 3/17/2025), Disp: 24 packet, Rfl: 2    niacinamide 500 MG tablet, Take 1 tablet (500 mg) by mouth 2 times daily  "(with meals). One pill twice daily, Disp: 90 tablet, Rfl: 6    omeprazole (PRILOSEC) 20 MG DR capsule, Take 1 capsule (20 mg) by mouth daily as needed (heartburn)., Disp: 90 capsule, Rfl: 1    Current Facility-Administered Medications:     lidocaine 1% with EPINEPHrine 1:100,000 injection 3 mL, 3 mL, Intradermal, Once, Cuate Ambrocio MD    Allergies - No Known Allergies    Social History -   Social History     Socioeconomic History    Marital status:     Number of children: 0   Occupational History    Occupation: research     Employer: [employer]   Tobacco Use    Smoking status: Never    Smokeless tobacco: Never   Vaping Use    Vaping status: Never Used   Substance and Sexual Activity    Alcohol use: No     Alcohol/week: 0.0 standard drinks of alcohol    Drug use: No    Sexual activity: Yes     Partners: Male     Birth control/protection: Surgical     Comment: hyst   Social History Narrative    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.    One son, born by surrogacy in [place] in 2014.    Getting  in June 2017.       Family History -   Family History   Problem Relation Age of Onset    Skin Cancer Mother         BCC    Hypertension Mother     Hyperlipidemia Mother     Gout Mother     Heart Disease Father     Skin Cancer Maternal Grandmother     Skin Cancer Brother     Cancer Brother         BCC    Brain Cancer Paternal Uncle     Pancreatic Cancer Paternal Uncle     Skin Cancer Maternal Cousin     Cancer - colorectal No family hx of     Breast Cancer No family hx of     Melanoma No family hx of        Review of Systems - As per HPI and PMHx, otherwise review of system review of the head and neck negative. Otherwise 10+ review of system is negative    Physical Exam  /74 (BP Location: Right arm, Patient Position: Sitting, Cuff Size: Adult Regular)   Pulse 99   Temp 96.8  F (36  C) (Temporal)   Ht 1.575 m (5' 2\")   Wt 64.3 kg (141 lb 11.2 oz)   BMI 25.92 kg/m    BMI: Body mass index is 25.92 " kg/m .    General - The patient is well nourished and well developed, and appears to have good nutritional status.  Alert and oriented to person and place, answers questions and cooperates with examination appropriately.    SKIN - No suspicious lesions or rashes.  Respiration - No respiratory distress.  Head and Face - Normocephalic and atraumatic, with no gross asymmetry noted of the contour of the facial features.  The facial nerve is intact, with strong symmetric movements.    Voice and Breathing - The patient was breathing comfortably without the use of accessory muscles. The patients voice was clear and strong, and had appropriate pitch and quality.    Ears - Bilateral pinna and EACs with normal appearing overlying skin. Tympanic membrane intact with good mobility on pneumatic otoscopy bilaterally. Bony landmarks of the ossicular chain are normal. The tympanic membranes are normal in appearance. No retraction, perforation, or masses.  No fluid or purulence was seen in the external canal or the middle ear.     Eyes - Extraocular movements intact.  Sclera were not icteric or injected, conjunctiva were pink and moist.    Mouth - Examination of the oral cavity showed pink, healthy oral mucosa. No lesions or ulcerations noted.  The tongue was mobile and midline, and the dentition were in good condition.      Throat - The walls of the oropharynx were smooth, pink, moist, symmetric, and had no lesions or ulcerations.  The tonsillar pillars and soft palate were symmetric.  The uvula was midline on elevation.    Neck - Normal midline excursion of the laryngotracheal complex during swallowing.  Full range of motion on passive movement.  Palpation of the occipital, submental, submandibular, internal jugular chain, and supraclavicular nodes did not demonstrate any abnormal lymph nodes or masses.  The carotid pulse was palpable bilaterally.  Palpation of the thyroid was soft and smooth, with no nodules or goiter  appreciated.  The trachea was mobile and midline.    Nose - External contour is asymmetric, some deficiency in the inferior aspect of the upper lateral cartilage on the left side.  Nasal mucosa is pink and moist with no abnormal mucus.  The septum was very deviated to the right and obstructive, turbinates of large size and position.  No polyps, masses, or purulence noted on examination.  There was significant nasal valve collapse on the left internally.  Modified Hussein maneuver was instrumental in improving nasal breathing on the left side.  Neuro - Nonfocal neuro exam is normal, CN 2 through 12 intact, normal gait and muscle tone.          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  This could have been posttraumatic event.  We discussed different options at this point and patient is very interested in going ahead with septoplasty submucous resection of turbinates as well as internal nasal valve reconstruction using autologous batten graft.We discuss risks and possible complication of septoplasty including septal perforation, bleeding(that may require packing), infection, loss of smell, stenosis, CSF rhinorrhea.  Also possibly somatic effect of implanted cartilage is discussed.  With this knowledge the patient wishes to proceed with the surgery.       Dionte Quevedo MD

## 2025-03-21 PROBLEM — J34.3 HYPERTROPHY OF BOTH INFERIOR NASAL TURBINATES: Status: ACTIVE | Noted: 2021-06-23

## 2025-03-21 PROBLEM — J34.829 NASAL VALVE COLLAPSE: Status: ACTIVE | Noted: 2025-03-17

## 2025-03-21 PROBLEM — J34.89 NASAL OBSTRUCTION: Status: ACTIVE | Noted: 2025-03-17

## 2025-04-01 ENCOUNTER — LAB (OUTPATIENT)
Dept: LAB | Facility: CLINIC | Age: 55
End: 2025-04-01
Attending: FAMILY MEDICINE
Payer: COMMERCIAL

## 2025-04-01 ENCOUNTER — OFFICE VISIT (OUTPATIENT)
Dept: FAMILY MEDICINE | Facility: CLINIC | Age: 55
End: 2025-04-01
Attending: FAMILY MEDICINE
Payer: COMMERCIAL

## 2025-04-01 ENCOUNTER — TELEPHONE (OUTPATIENT)
Dept: DERMATOLOGY | Facility: CLINIC | Age: 55
End: 2025-04-01
Payer: COMMERCIAL

## 2025-04-01 VITALS
WEIGHT: 139.2 LBS | HEIGHT: 62 IN | SYSTOLIC BLOOD PRESSURE: 102 MMHG | BODY MASS INDEX: 25.62 KG/M2 | HEART RATE: 78 BPM | DIASTOLIC BLOOD PRESSURE: 71 MMHG

## 2025-04-01 DIAGNOSIS — E78.00 PURE HYPERCHOLESTEROLEMIA: Primary | ICD-10-CM

## 2025-04-01 DIAGNOSIS — R74.8 ELEVATED LIVER ENZYMES: ICD-10-CM

## 2025-04-01 LAB
ALBUMIN SERPL BCG-MCNC: 4.7 G/DL (ref 3.5–5.2)
ALP SERPL-CCNC: 198 U/L (ref 40–150)
ALT SERPL W P-5'-P-CCNC: 78 U/L (ref 0–50)
AST SERPL W P-5'-P-CCNC: 56 U/L (ref 0–45)
BILIRUB DIRECT SERPL-MCNC: 0.09 MG/DL (ref 0–0.3)
BILIRUB SERPL-MCNC: 0.4 MG/DL
ERYTHROCYTE [DISTWIDTH] IN BLOOD BY AUTOMATED COUNT: 11.9 % (ref 10–15)
HCT VFR BLD AUTO: 39.8 % (ref 35–47)
HGB BLD-MCNC: 13.6 G/DL (ref 11.7–15.7)
MCH RBC QN AUTO: 32.5 PG (ref 26.5–33)
MCHC RBC AUTO-ENTMCNC: 34.2 G/DL (ref 31.5–36.5)
MCV RBC AUTO: 95 FL (ref 78–100)
PLATELET # BLD AUTO: 267 10E3/UL (ref 150–450)
PROT SERPL-MCNC: 7.2 G/DL (ref 6.4–8.3)
RBC # BLD AUTO: 4.18 10E6/UL (ref 3.8–5.2)
WBC # BLD AUTO: 4.3 10E3/UL (ref 4–11)

## 2025-04-01 PROCEDURE — 99213 OFFICE O/P EST LOW 20 MIN: CPT | Performed by: FAMILY MEDICINE

## 2025-04-01 PROCEDURE — 36415 COLL VENOUS BLD VENIPUNCTURE: CPT

## 2025-04-01 PROCEDURE — 80076 HEPATIC FUNCTION PANEL: CPT

## 2025-04-01 PROCEDURE — 85018 HEMOGLOBIN: CPT

## 2025-04-01 NOTE — TELEPHONE ENCOUNTER
Called and spoke with pt about her upcoming appt. Pt understands the PDL is broken and she will not have access to it on Monday. Pt would like to come in for possible ILK and for Dr. Garcia to check a lesion of concern on her shoulder.     Zenaida Gutierrez, Complex  4/1/2025 10:32 AM

## 2025-04-01 NOTE — PATIENT INSTRUCTIONS
Thank you for trusting us with your care!   Please be aware, if you are on Mychart, you may see your results prior to your providers review. If labs are abnormal, we will call or message you on [email or web address] with a follow up plan.    If you need to contact us for questions about:  Symptoms, Scheduling & Medical Questions; Non-urgent (2-3 day response) [facility] message, Urgent (needing response today) [phone] (if after 3:30pm next day response)   Prescriptions: Please call your Pharmacy   Billing: [facility] [phone] or [facility]:[phone]    Imaging scheduling  [facility] [phone] Clin[facility] and [facility]

## 2025-04-01 NOTE — PROGRESS NOTES
CC: Follow Up (Labs from 3/12/25)      ASSESSMENT/PLAN:   Shannan was seen today for follow up.    Diagnoses and all orders for this visit:    Pure hypercholesterolemia  Discussed recommendation to start statin with LDL >190. Shannan is concerned about possibly adverse effects on her liver. Will obtain CT calcium score to further evaluate, consider preventive cardiology consult. If we did start a statin would recommend close monitoring of labs about 4 weeks after starting. She had side effects from atorvastatin in the past so would likely try rosuvastatin instead.   -     CT Coronary Calcium Scan; Future    Elevated liver enzymes  Evaluate platelets and liver function, obtain RUQ US, and GI referral for continued evaluation.   -     CBC with Platelets; Future  -     US Abdomen Limited; Future  -     Hepatic Panel; Future  -     Adult GI  Referral - Consult Only; Future      Worrisome signs and symptoms were discussed with Shannan and she was instructed to return to the clinic for concerning symptoms or to call with questions.      Ginna Duong MD  Family Medicine      SUBJECTIVE: Shannan is a 55 year old female who comes in with the following concerns:    Hyperlipidemia. Long-standing diagnosis.    - Most recent surveillance labs reviewed today and are notable for  (4/2024).   - The 10-year ASCVD risk score (Tyree COFFEY, et al., 2019) is: 1.4%    Values used to calculate the score:      Age: 55 years      Sex: Female      Is Non- : No      Diabetic: No      Tobacco smoker: No      Systolic Blood Pressure: 102 mmHg      Is BP treated: No      HDL Cholesterol: 83 mg/dL      Total Cholesterol: 317 mg/dL   - FH of HLD - in her mother.   - FH of early CAD - in her father at in his 40s.   - No personal history of ASCVD.  - Exercises regularly.  - Nutrition is varied & balanced. Doesn't eat much meat. A lot of vegetables and yogurt.   - Nonsmoker. Very occasional alcohol use.   - She tried  "atorvastatin for about 2 weeks but had side effects (headache).     Elevated liver enzymes. Long-standing diagnosis.   - As far back as 12 years ago, ALT 55 and has been elevated on most checks each year.   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   - She does have acid reflux and hasn't had an EGD in years -- she takes famotidine most every day and then omeprazole just as needed.     PMH, PSH, FH, SH, medications, and allergies reviewed and updated in Epic.   No tobacco use, occasional alcohol use, and no drug use.       OBJECTIVE:   /71   Pulse 78   Ht 1.575 m (5' 2\")   Wt 63.1 kg (139 lb 3.2 oz)   BMI 25.46 kg/m    General: Alert and oriented in no acute distress.  Cardio: RRR, normal S1 and S2, without murmurs, rubs, or gallops appreciated.    Resp: CTA bilaterally. Normal respiratory effort.   GI: Soft, NT, ND.  No masses or hepatosplenomegaly appreciated.    Psych: Mood and affect appropriate.      Component      Latest Ref Rng 4/25/2024  2:10 PM 3/12/2025  11:11 AM   Cholesterol      <200 mg/dL 322 (H)  317 (H)    Triglycerides      <150 mg/dL 91  109    HDL Cholesterol      >=50 mg/dL 91  83    LDL Cholesterol Calculated      <100 mg/dL 213 (H)  212 (H)    Non HDL Cholesterol      <130 mg/dL 231 (H)  234 (H)    Patient Fasting? Yes  Yes       Component      Latest Ref Rng 3/12/2025  11:11 AM   Protein Total      6.4 - 8.3 g/dL 7.3    Albumin      3.5 - 5.2 g/dL 4.7    Bilirubin Total      <=1.2 mg/dL 0.3    Alkaline Phosphatase      40 - 150 U/L 158 (H)    AST      0 - 45 U/L 47 (H)    ALT      0 - 50 U/L 70 (H)    Bilirubin Direct      0.00 - 0.30 mg/dL 0.14             "

## 2025-04-02 ENCOUNTER — TELEPHONE (OUTPATIENT)
Dept: OBGYN | Facility: CLINIC | Age: 55
End: 2025-04-02
Payer: COMMERCIAL

## 2025-04-02 NOTE — TELEPHONE ENCOUNTER
[facility] referral faxed to [facility] Lizz at [phone] per patient request.

## 2025-04-07 ENCOUNTER — ANCILLARY PROCEDURE (OUTPATIENT)
Dept: ULTRASOUND IMAGING | Facility: CLINIC | Age: 55
End: 2025-04-07
Attending: FAMILY MEDICINE
Payer: COMMERCIAL

## 2025-04-07 ENCOUNTER — OFFICE VISIT (OUTPATIENT)
Dept: DERMATOLOGY | Facility: CLINIC | Age: 55
End: 2025-04-07
Attending: DERMATOLOGY
Payer: COMMERCIAL

## 2025-04-07 DIAGNOSIS — B35.3 TINEA PEDIS OF BOTH FEET: Primary | ICD-10-CM

## 2025-04-07 DIAGNOSIS — R74.8 ELEVATED LIVER ENZYMES: ICD-10-CM

## 2025-04-07 DIAGNOSIS — D49.2 NEOPLASM OF UNSPECIFIED BEHAVIOR OF BONE, SOFT TISSUE, AND SKIN: ICD-10-CM

## 2025-04-07 DIAGNOSIS — C44.91 SUPERFICIAL BASAL CELL CARCINOMA: ICD-10-CM

## 2025-04-07 PROCEDURE — 76705 ECHO EXAM OF ABDOMEN: CPT | Mod: GC | Performed by: RADIOLOGY

## 2025-04-07 RX ORDER — IMIQUIMOD 12.5 MG/.25G
CREAM TOPICAL
Qty: 24 PACKET | Refills: 2 | Status: SHIPPED | OUTPATIENT
Start: 2025-04-07

## 2025-04-07 RX ORDER — ECONAZOLE NITRATE 10 MG/G
CREAM TOPICAL DAILY
Qty: 85 G | Refills: 4 | Status: SHIPPED | OUTPATIENT
Start: 2025-04-07

## 2025-04-07 NOTE — NURSING NOTE
Chief Complaint   Patient presents with    RECHECK     Scar eval, ilk injection, lesions of concern on shoulder     Addie RN-BSN  Dermatology Surgery

## 2025-04-07 NOTE — LETTER
4/7/2025       RE: Shannan Harris  [address]  [facility] [address]     Dear Colleague,    Thank you for referring your patient, Shannan Harris, to the [facility] DERMATOLOGIC SURGERY CLINIC [place] at [facility]. Please see a copy of my visit note below.    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, s/p bx 10/10/24, s/p MMS 11/18/24   - ILK-20 0.2 ml 02/12/25  - early BCC (not biopsied), L calf, s/p Imiquimod 2/1/24  - sfBCC, R proximal anterior thigh s/p imiquimod 3/16/23  - sfBCC, L distal lateral thigh s/p imiquimod 3/16/23  - sfBCC, Right thigh s/p imiquimod 3/16/23  - superficial BCC, L ankle s/p bx 3/4/21 treated with Aldara   - superficial BCC, R anterior shin, s/p bx 3/4/21 treated with Aldara  - BCC, R medial lower leg, s/p Mohs 1/27/20  - BCC, R medial calf, s/p Mohs 1/27/20  - BCC, L shin medial, s/p ED&C 11/21/19  - BCC, L shin lateral, s/p ED&C 11/21/19  - BCC, L upper chest status post ED&C  - BCC, abdomen  - BCC, R flank  # Tinea pedis  - Current Tx: econazole cream  # Hx dysplastic nevus  - mod, central upper back s/p exc 8/11/16  # AK s/p LN2     SHx: Grew up in [place]; no history of radiation; no known well-water history use.  FHx: Family with small numbers of BCCs, but not at the number she gets.  ____________________________________________    Assessment & Plan:      #  Scar evaluation after 11/18/24 MMS with advancement flap repair of BCC on the left upper cutaneous lip   - s/p  ILK-20 0.2 ml 02/12/25, responded nicely   - no erythema noted   - no further treatment recommended at this time   - The patient should follow up with dermatologic surgery PRN, as well as continue with regular skin exams in general dermatology clinic.     # ** NUB, probable BCCs  - s/p 4 weeks of imiquimod as of 04/07/25, plan for 2 more weeks of treatment with clinical follow up in 4-6 weeks  - right superior posterior shoulder   - right inferior posterior shoulder     Procedures Performed:   None    Follow-up: 4 -6 week(s) in-person, or earlier for new or changing lesions    Staff and Resident:   ANAMARIA Garcia and Elba Heart, DO   ____________________________________________    CC: RECHECK (Scar eval, ilk injection, lesions of concern on shoulder)    HPI:    Shannan Harris is a 55 year old female who presents today for scar evaluation after 11/18/24 MMS with advancement flap repair of BCC on the left upper cutaneous lip.     - Today, she feels that her postsurgical scar is healing well.  She feels that the ILK did a lot for her scar and she reports that she is pleased with it's appearance   - Does not wish to pursue laser treatments at this time     - Was prescribed topical imiquimod for presumed BCC on her right shoulder, posteriorly   - has been using for 4 weeks and had a robust response   - non tender, no longer bleeding, no other concerns or complaints reported today     - no other concerns today    Patient is otherwise feeling well, without additional skin concerns.    Labs Reviewed:  N/A    Physical Exam:  Vitals: There were no vitals taken for this visit.  SKIN: Focused examination of face and shoudler was performed.  - 2 Scaly, ulcerated papules overlying an erythematous base with heaped borders on the posterior shoulder, inferior and superiorly, non tender, no active drainage or  bleeding     - Well-approximated scar on the left upper cutaneous lip.      - No other lesions of concern on areas examined.     Medications:  Current Outpatient Medications   Medication Sig Dispense Refill     estradiol (VIVELLE-DOT) 0.025 MG/24HR bi-weekly patch Place 1 patch onto the skin twice a week 24 patch 3     famotidine (PEPCID) 40 MG tablet Take 1 tablet (40 mg) by mouth at bedtime. 90 tablet 1     niacinamide 500 MG tablet Take 1 tablet (500 mg) by mouth 2 times daily (with meals). One pill twice daily 90 tablet 6     omeprazole (PRILOSEC) 20 MG DR capsule Take 1 capsule (20 mg) by mouth daily as needed (heartburn). 90 capsule 1     UNABLE TO FIND daily. MEDICATION NAME: CDP choline 1000 mg       imiquimod (ALDARA) 5 % external cream Apply once daily 5 days per week, for 6 weeks to area on left posterior calf (Patient not taking: Reported on 4/7/2025) 24 packet 2     Current Facility-Administered Medications   Medication Dose Route Frequency Provider Last Rate Last Admin     lidocaine 1% with EPINEPHrine 1:100,000 injection 3 mL  3 mL Intradermal Once Cuate Ambrocio MD          Past Medical History:   Patient Active Problem List   Diagnosis     Multiple melanocytic nevi     Hot flashes     Neoplasm of uncertain behavior of skin     Tinea pedis of both feet     Deviated nasal septum     Seborrheic keratoses, inflamed     Superficial basal cell carcinoma     Hypertrophy of both inferior nasal turbinates     Gastric ulcer without hemorrhage or perforation     Pure hypercholesterolemia     History of basal cell carcinoma     Nasal obstruction     Nasal valve collapse     Past Medical History:   Diagnosis Date     Gastroesophageal reflux disease      History of basal cell carcinoma      Hot flashes      Nondisplaced fracture of fifth left metatarsal bone with routine healing 09/12/2016     Pure hypercholesterolemia 12/2/2022       CC Alexander Garcia MD  [id number] [place] [address] on close of this  encounter.    Attestation signed by Alexander Garcia MD at 4/8/2025 11:22 AM:  Attending Attestation  I attest that the Fellow recorded the interview and exam that I personally performed.  I have reviewed the note and edited it as necessary.  Patient has response of presumed BCCs to aldara.  Advised her to complete her 6 week course.  Give it 2 weeks to heal and we can biopsy then to see if cleared (she prefers biopsy assurance.).  Alexander Garcia M.D.  Professor  Director of Dermatologic Surgery  Department of Dermatology  [facility]      Again, thank you for allowing me to participate in the care of your patient.      Sincerely,    Alexander Garcia MD

## 2025-04-07 NOTE — PROGRESS NOTES
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, s/p bx 10/10/24, s/p MMS 11/18/24   - ILK-20 0.2 ml 02/12/25  - early BCC (not biopsied), L calf, s/p Imiquimod 2/1/24  - sfBCC, R proximal anterior thigh s/p imiquimod 3/16/23  - sfBCC, L distal lateral thigh s/p imiquimod 3/16/23  - sfBCC, Right thigh s/p imiquimod 3/16/23  - superficial BCC, L ankle s/p bx 3/4/21 treated with Aldara   - superficial BCC, R anterior shin, s/p bx 3/4/21 treated with Aldara  - BCC, R medial lower leg, s/p Mohs 1/27/20  - BCC, R medial calf, s/p Mohs 1/27/20  - BCC, L shin medial, s/p ED&C 11/21/19  - BCC, L shin lateral, s/p ED&C 11/21/19  - BCC, L upper chest status post ED&C  - BCC, abdomen  - BCC, R flank  # Tinea pedis  - Current Tx: econazole cream  # Hx dysplastic nevus  - mod, central upper back s/p exc 8/11/16  # AK s/p LN2     SHx: Grew up in [place]; no history of radiation; no known well-water history use.  FHx: Family with small numbers of BCCs, but not at the number she gets.  ____________________________________________    Assessment & Plan:     #  Scar evaluation after 11/18/24 MMS with advancement flap repair of BCC on the left upper cutaneous lip   - s/p  ILK-20 0.2 ml 02/12/25, responded nicely   - no erythema noted   - no further treatment recommended at this time   - The patient should follow up with dermatologic surgery PRN, as well as continue with  regular skin exams in general dermatology clinic.     # ** NUB, probable BCCs  - s/p 4 weeks of imiquimod as of 04/07/25, plan for 2 more weeks of treatment with clinical follow up in 4-6 weeks  - right superior posterior shoulder   - right inferior posterior shoulder     Procedures Performed:   None    Follow-up: 4 -6 week(s) in-person, or earlier for new or changing lesions    Staff and Resident:   ANAMARIA Garcia and Elba Heart, DO   ____________________________________________    CC: RECHECK (Scar eval, ilk injection, lesions of concern on shoulder)    HPI:    Shannan Harris is a 55 year old female who presents today for scar evaluation after 11/18/24 MMS with advancement flap repair of BCC on the left upper cutaneous lip.     - Today, she feels that her postsurgical scar is healing well.  She feels that the ILK did a lot for her scar and she reports that she is pleased with it's appearance   - Does not wish to pursue laser treatments at this time     - Was prescribed topical imiquimod for presumed BCC on her right shoulder, posteriorly   - has been using for 4 weeks and had a robust response   - non tender, no longer bleeding, no other concerns or complaints reported today     - no other concerns today    Patient is otherwise feeling well, without additional skin concerns.    Labs Reviewed:  N/A    Physical Exam:  Vitals: There were no vitals taken for this visit.  SKIN: Focused examination of face and shoudler was performed.  - 2 Scaly, ulcerated papules overlying an erythematous base with heaped borders on the posterior shoulder, inferior and superiorly, non tender, no active drainage or bleeding     - Well-approximated scar on the left upper cutaneous lip.      - No other lesions of concern on areas examined.     Medications:  Current Outpatient Medications   Medication Sig Dispense Refill    estradiol (VIVELLE-DOT) 0.025 MG/24HR bi-weekly patch Place 1 patch onto the skin twice a week 24 patch 3     famotidine (PEPCID) 40 MG tablet Take 1 tablet (40 mg) by mouth at bedtime. 90 tablet 1    niacinamide 500 MG tablet Take 1 tablet (500 mg) by mouth 2 times daily (with meals). One pill twice daily 90 tablet 6    omeprazole (PRILOSEC) 20 MG DR capsule Take 1 capsule (20 mg) by mouth daily as needed (heartburn). 90 capsule 1    UNABLE TO FIND daily. MEDICATION NAME: CDP choline 1000 mg      imiquimod (ALDARA) 5 % external cream Apply once daily 5 days per week, for 6 weeks to area on left posterior calf (Patient not taking: Reported on 4/7/2025) 24 packet 2     Current Facility-Administered Medications   Medication Dose Route Frequency Provider Last Rate Last Admin    lidocaine 1% with EPINEPHrine 1:100,000 injection 3 mL  3 mL Intradermal Once Cuate Ambrocio MD          Past Medical History:   Patient Active Problem List   Diagnosis    Multiple melanocytic nevi    Hot flashes    Neoplasm of uncertain behavior of skin    Tinea pedis of both feet    Deviated nasal septum    Seborrheic keratoses, inflamed    Superficial basal cell carcinoma    Hypertrophy of both inferior nasal turbinates    Gastric ulcer without hemorrhage or perforation    Pure hypercholesterolemia    History of basal cell carcinoma    Nasal obstruction    Nasal valve collapse     Past Medical History:   Diagnosis Date    Gastroesophageal reflux disease     History of basal cell carcinoma     Hot flashes     Nondisplaced fracture of fifth left metatarsal bone with routine healing 09/12/2016    Pure hypercholesterolemia 12/2/2022       CC Alexander Garcia MD  [address] [place] [address] on close of this encounter.

## 2025-04-08 ENCOUNTER — TRANSFERRED RECORDS (OUTPATIENT)
Dept: MULTI SPECIALTY CLINIC | Facility: CLINIC | Age: 55
End: 2025-04-08

## 2025-04-08 DIAGNOSIS — K76.9 LIVER LESION, LEFT LOBE: Primary | ICD-10-CM

## 2025-04-08 LAB
ALT SERPL-CCNC: 46 U/L (ref 0–32)
AST SERPL-CCNC: 34 U/L (ref 0–40)
HEP C HIM: NORMAL
INR (EXTERNAL): 0.9 (ref 0.9–1.2)

## 2025-04-09 ENCOUNTER — TRANSFERRED RECORDS (OUTPATIENT)
Dept: ADMINISTRATIVE | Facility: CLINIC | Age: 55
End: 2025-04-09
Payer: COMMERCIAL

## 2025-04-10 ENCOUNTER — ANCILLARY PROCEDURE (OUTPATIENT)
Dept: MAMMOGRAPHY | Facility: CLINIC | Age: 55
End: 2025-04-10
Attending: OBSTETRICS & GYNECOLOGY
Payer: COMMERCIAL

## 2025-04-10 DIAGNOSIS — Z12.31 VISIT FOR SCREENING MAMMOGRAM: ICD-10-CM

## 2025-04-15 ENCOUNTER — TRANSFERRED RECORDS (OUTPATIENT)
Dept: ADMINISTRATIVE | Facility: CLINIC | Age: 55
End: 2025-04-15
Payer: COMMERCIAL

## 2025-04-15 NOTE — PROGRESS NOTES
_________________________________________________________________________________________________    [address] [address]  [place] [address]  [phone]      Patient:            Shannan Harris   YOB: 1970  Date:                    4/8/2025  Historian:    self  Visit Type:              Office Visit   Rendering Provider:  Tarun Mariscal DO    History of Present Illness:    Ms. Shannan Harris is a 55-year-old female seen in consultation with her primary care provider Dr. Ginna Duong regarding elevation in liver enzymes and abnormal ultrasound.    Patient synopsis  Patient was told 12 years ago that she had liver enzyme elevations.  She cannot recall ever being told she had a cause of it other than potentially fatty liver disease that she has a history of hyperlipidemia.  As of 2025 her platelet count 267 alk phos 198 AST ALT of 56/78.  She underwent an ultrasound that showed a lesion that was 1 cm x 0.8 cm..  She has never had any signs or symptoms of liver decompensation.  Patient is concerned about Opisthorchiasis as it is found in [facility] near her hometown and commonly causes liver problems for people.    Today, patient is tearful she is afraid she has hepatocellular carcinoma as she is a liver cancer researcher at [employer].  She denies night sweats weight loss fever chills abdominal pain abdominal ascension nausea vomiting diarrhea constipation blood in stool difficulty thinking jaundice.    Past surgical history  Liver lesion ultrasound  H. pylori treated  GERD is on treatment but has breakthrough    Past surgical history  Colonoscopy 2022: No abnormal findings    Family history  No family Struve liver disease  No family history of colon cancer  Grandfather had unknown GI malignancy    Social  Is [employer] by [employer]  Rare alcohol use  He was born in [facility] and frequently visits there  No herbal supplements but was taking green tea at 1 time  Is a PhD in cancer research at the  [employer]    Assessment/Plan  # Detail Type Description   1. Assessment Abnormal liver enzymes (R74.8).   2. Assessment Abnormal liver ultrasound (R93.2).     Detail Type Description   Provider Plan Ms. Shannan Harris is a 55-year-old female with a history of 12+ years of liver enzyme elevation who presents after her liver enzymes were noted to be elevated with alk phos 198 AST ALT of 56/78 and an ultrasound that showed a 1 cm x 0.8 cm lesion.  She has no evidence of cirrhosis by serologies.  She was born in [place] and is concerned about [place]  Today we talked about the fact that while she could have a liver fluke we have to rule out more common things such as autoimmune hepatitis hepatitis B C celiac disease, genetic causes of liver enzyme elevation.  If all the following is negative we will most likely move forward with a liver biopsy.  Additionally we will be doing an MRI of her liver to assess the liver lesion.  This is unlikely hepatocellular carcinoma as organic hepatocellular carcinoma without cirrhosis is very rare.  If all the preceding is negative I will consider working up for the liver fluke.    Dyspepsia: I stated I would be ordering her an EGD.  I am going to hold on ordering the EGD until her liver workup is completed.     Orders  Labs:  Test Comments Timeframe Assessment   Actin (Smooth Muscle) Antibody   R74.8   Actin (Smooth Muscle) Antibody  First Available R74.8   Alpha-1-Antitrypsin Deficiency Profile  First Available R74.8   Alpha-1-Antitrypsin Deficiency Profile   R74.8   AntiMitochondrial Ab (AMA), By IFA First Available  R74.8   AntiMitochondrial Ab (AMA), By IFA First Available First Available R74.8   Antinuclear Antibodies, INDERJIT, IFA  First Available R74.8   Antinuclear Antibodies, INDERJIT, IFA   R74.8   CBC W/Diff, Whole Blood  First Available R74.8   Celiac: TTG IgA + Total IgA First Available  R74.8   Celiac: TTG  IgA + Total IgA First Available First Available R74.8   Ceruloplasmin First Available First Available R74.8   Ferritin First Available First Available R74.8   Hep B Surface Ab, Qual  First Available R74.8   Hep B surface Ag  First Available R74.8   Hep C Ab  First Available R74.8   Hepatic Function Panel First Available First Available R74.8   Immunoglobulin G, Qn, Serum  First Available R74.8   Iron/TIBC  First Available R74.8   PT/INR, Whole Blood First Available First Available R74.8   PT/INR, Whole Blood First Available  R74.8       Diagnostics:  Procedure Comments Timeframe Assessment   MRI Liver WITHOUT And WITH Contrast  First Available R93.2     Follow-up visit/Referral:  Order Comments   follow-up visit 3 Months        Fall Risk Plan  The patient has not fallen in the last year.      cc:  Ginna Duong MD  cc:  Ginna Duong MD    Electronically Signed by:  Tarun Mariscal DO  04/08/2025 04:46 PM      Medications:  Medication Dose Sig Description Comments   estradiol 0.025 mg/24 hr semiweekly transdermal patch 0.025 mg/24 hour apply 1 patch by transdermal route 2 times every week cyclically, 3 weeks on and 1 week off taking as directed   famotidine 40 mg tablet 40 mg take 1 tablet by oral route  every day at bedtime    imiquimod 5 % topical cream packet 5 % apply by topical route 5 times every week to the affected area(s)    niacinamide 500 mg tablet 500 mg     omeprazole 40 mg capsule,delayed release 40 mg take 1 capsule by oral route  every day before a meal taking as directed     Allergies:  Medication Name Ingredient Reaction Comment    NO KNOWN ALLERGIES       Vitals:  BP mm/Hg Pulse/min Resp/min Temp F Height (Total in.) Weight (lbs.) Weight (oz.) BMI   95/56 62   63.00 139.00  24.62     Smoking Status:    Use Status Type Smoking Status Usage Per Day Years Used Total Pack Years   no/never  Never smoker      no/never  Never smoker        Race:  White  Ethnicity:  Not  or   Preferred Language:   English

## 2025-04-15 NOTE — PROCEDURES
04/15/2025        Shannan Harris   [address] [place] [address]      Shannan Harris,  :  1970    Negative lab work for Celiac disease. Negative labs for autoimmune hepatitis  HBsAg Screen 2025 15:25   Description Result Units Flags Range   HBsAg Screen Negative   Negative   Comments   Performed At: DV, [facility] [address], [place], [place], [id number]  Mehdi Howell MD, Phone: [phone]   Ferritin 2025 15:25   Description Result Units Flags Range   Ferritin 205 ng/mL H    Comments   Performed At: , [facility] [address], [place], [place], [id number] Jaeger MD, Phone: [phone]   Immunoglobulin G, Qn, Serum 2025 15:25   Description Result Units Flags Range   Immunoglobulin G, Qn, Serum 917 mg/dL  586-1602   Comments   Performed At: DV, [facility] [address], [place], [place], [id number]  Mehdi Howell MD, Phone: [phone]   Ceruloplasmin 2025 15:25   Description Result Units Flags Range   Ceruloplasmin 26.0 mg/dL  19.0-39.0   Comments   Performed At: DV, [facility] [address], [place], [place], [id number]  Mehdi Howell MD, Phone: [phone]   HCV Antibody 2025 15:25   Description Result Units Flags Range   Hep C Virus Ab Non Reactive   Non Reactive   Comments   Performed At: DV, [facility] [address], [place], [place], [id number]  Mehdi Howell MD, Phone: [phone]   Hep C Virus Ab:  HCV antibody alone does not differentiate between previously  resolved infection and active infection. Equivocal and Reactive  HCV antibody results should be followed up with an HCV RNA test  to support the diagnosis of active HCV infection.   Hep B Surface Ab, Qual 2025 15:25   Description Result Units Flags Range   Hep B Surface Ab, Qual Reactive      Comments   Performed At: DV, [facility] [address], [place], [place], [id number] Jaeger MD, Phone: [phone]   Hep B Surface Ab, Qual:                               Non Reactive: Not immune to HBV infection.                              Equivocal: Unable to determine if anti-HBs                                         is present at levels consistent                                         with immunity.                               Reactive: Anti-HBs concentration detected                                         at greater than 10 mIU/mL.                                         Individual is considered to be                                         immune to infection with HBV.   Iron and TIBC 04/08/2025 15:25   Description Result Units Flags Range   Iron 113 ug/dL     Iron Bind.Cap.(TIBC) 350 ug/dL  250-450   Iron Saturation 32 %  15-55   UIBC 237 ug/dL  131-425   Comments   Performed At: [facility] [place], [place], [id number]  Mehdi Howell MD, Phone: [phone]   Hepatic Function Panel (7) 04/08/2025 15:25   Description Result Units Flags Range   Bilirubin, Total 0.3 mg/dL  0.0-1.2   Bilirubin, Direct 0.11 mg/dL  0.00-0.40   AST (SGOT) 34 IU/L  0-40   ALT (SGPT) 46 IU/L H 0-32   Albumin 5.1 g/dL H 3.8-4.9   Alkaline Phosphatase 169 IU/L H    Protein, Total 7.3 g/dL  6.0-8.5   Comments   Performed At: DV, [facility] [address] [place], [place], [id number]  Mehdi Howell MD, Phone: [phone]   CBC With Differential/Platelet 04/08/2025 15:25   Description Result Units Flags Range   Hemoglobin 13.6 g/dL  11.1-15.9   Hematocrit 40.5 %  34.0-46.6   MCV 96 fL  79-97   MCHC 33.6 g/dL  31.5-35.7   MCH 32.2 pg  26.6-33.0   RDW 12.4 %  11.7-15.4   Platelets 303 x10E3/uL  150-450   Neutrophils 46 %  Not Estab.   Lymphs 43 %  Not Estab.   Monocytes 10 %  Not Estab.   Eos 1 %  Not Estab.   Basos 0 %  Not Estab.   Neutrophils (Absolute) 2.6 x10E3/uL  1.4-7.0   Lymphs (Absolute) 2.4 x10E3/uL  0.7-3.1   Monocytes(Absolute) 0.6 x10E3/uL  0.1-0.9   Eos (Absolute) 0.0 x10E3/uL  0.0-0.4   Baso (Absolute) 0.0 x10E3/uL  0.0-0.2    Immature Grans (Abs) 0.0 x10E3/uL  0.0-0.1   Immature Granulocytes 0 %  Not Estab.   RBC 4.22 x10E6/uL  3.77-5.28   WBC 5.6 x10E3/uL  3.4-10.8   Comments   First Available First Avai lable First Available        Performed At: , [facility]  [address] [facility], [place], [place], [id number]  Mehdi Howell MD, Phone: [phone]     INDERJIT by IFA Rfx Titer/Pattern 04/08/2025 15:25   Description Result Units Flags Range   INDERJIT by IFA Rfx Titer/Pattern Negative      Comments   Performed At: [facility], [facility] [place] [address], [place], [place], [id number]  Mehdi Howell MD, Phone: [phone]   INDERJIT by IFA Rfx Titer/Pattern:                                                      Negative   <1:80                                                      Borderline  1:80                                                      Positive   >1:80  ICAP nomenclature: [id number]  For more information about Hep-2 cell patterns use  ANApatterns.org, the official website for the International  Consensus on Antinuclear Antibody (INDERJIT) Patterns (ICAP).   Actin (Smooth Muscle) Antibody 04/08/2025 15:25   Description Result Units Flags Range   Actin (Smooth Muscle) Antibody 4 Units  0-19   Comments   Performed At: , [facility], [id number]  Juan Diego Osman MD, Phone: [phone]   Actin (Smooth Muscle) Antibody:                                  Negative                     0 - 19                                  Weak positive               20 - 30                                  Moderate to strong positive     >30                                                                      .                  Actin Antibodies are found in 52-85% of patients with                  autoimmune hepatitis or chronic active hepatitis and                  in 22% of patients with primary biliary cirrhosis.     IgA+t-Costello 04/08/2025 15:25   Description Result Units Flags Range   Immunoglobulin A, Qn, Serum 182 mg/dL      t-Transglutaminase (tTG) IgA 3 U/mL  0-3   Comments   First Available              Performed At: , [facility] [address], [place], [place], [id number]  Mehdi Howell MD, Phone: [phone]  Performed At: [facility] [address] [place], [place], [id number]  Juan Diego Osman MD, Phone: [phone]   t-Transglutaminase (tTG) IgA:                                               Negative        0 -  3                                               Weak Positive   4 - 10                                               Positive           >10                                                                    .                  Tissue Transglutaminase (tTG) has been identified                  as the endomysial antigen.  Studies have demonstr-                  ated that endomysial IgA antibodies have over 99%                  specificity for gluten sensitive enteropathy.     Prothrombin Time (PT) 04/08/2025 15:25   Description Result Units Flags Range   INR 0.9   0.9-1.2   Prothrombin Time 10.7 sec  9.1-12.0   Comments   First Available              Performed At: , [facility] [address], [place], [place], [id number]  Mehdi Howell MD, Phone: [phone]   INR:                 Reference interval is for non-anticoagulated patients.                                                                      .                 Suggested INR therapeutic range for Vitamin K                 antagonist therapy:                    Standard Dose (moderate intensity                                   therapeutic range):       2.0 - 3.0                    Higher intensity therapeutic range       2.5 - 3.5     Anti-Mitochondrial Ab by IFA 04/08/2025 15:25   Description Result Units Flags Range   Anti-Mitochondrial Ab by IFA <1:20   <1:20   Comments   First Available              Performed At: [facility]  [address] [place] [address], [id number]  José Luis Richard MD, Phone: [phone]            Thank you.    Electronically signed by:  Tarun Mariscal DO 04/15/2025 07:16 AM  Document generated by:  Tarun Mariscal DO  04/15/2025  If your provider ordered multiple tests; the results may not become available at the same time.  If multiple test results are received within 14 days of one another, you may receive a duplicate.  cc:  Ginna Duong MD

## 2025-04-15 NOTE — PROCEDURES
04/15/2025        Shannan Harris   [address] [place] [address]      Shannan Harris,  :  1970    Negative  blood work for a disease called primary biliary cholangitis.      Thank you.    Electronically signed by:  Tarun Mariscal DO 04/15/2025 07:14 AM  Document generated by:  Tarun Mariscal DO  04/15/2025  If your provider ordered multiple tests; the results may not become available at the same time.  If multiple test results are received within 14 days of one another, you may receive a duplicate.  cc:  Ginna Duong MD

## 2025-04-15 NOTE — PROCEDURES
04/10/2025        Shannan Harris   [address] [place] [address]      Shannan Harris,  :  1970    Shannan, of the tests that have come back there is no findings to explain your liver enzyme elevation.  You will note your ferritin is elevated but it is very minimally elevated.  It is common for this to be elevated in patients with fatty liver disease.  You do not have hemochromatosis.  HBsAg Screen 2025 15:25   Description Result Units Flags Range   HBsAg Screen Negative   Negative   Comments   Performed At: [facility] [address], [place], [place], [id number]  Mehdi Howell MD, Phone: [phone]   Ferritin 2025 15:25   Description Result Units Flags Range   Ferritin 205 ng/mL H    Comments   Performed At: [facility] [address], [place], [place], [id number]  Mehdi Howell MD, Phone: [phone]   Immunoglobulin G, Qn, Serum 2025 15:25   Description Result Units Flags Range   Immunoglobulin G, Qn, Serum 917 mg/dL  586-1602   Comments   Performed At: [facility] [address], [place], [place], [id number]  Mehdi Howell MD, Phone: [phone]   Ceruloplasmin 2025 15:25   Description Result Units Flags Range   Ceruloplasmin 26.0 mg/dL  19.0-39.0   Comments   Performed At: [facility] [address], [place], [place], [id number]  Mehdi Howell MD, Phone: [phone]   HCV Antibody 2025 15:25   Description Result Units Flags Range   Hep C Virus Ab Non Reactive   Non Reactive   Comments   Performed At: [facility] [address], [place], [place], [id number]  Mehdi Howell MD, Phone: [phone]   Hep C Virus Ab:  HCV antibody alone does not differentiate between previously  resolved infection and active infection. Equivocal and Reactive  HCV antibody results should be followed up with an HCV RNA test  to support the diagnosis of active HCV infection.   Hep B Surface Ab, Qual 2025 15:25   Description Result Units  Flags Range   Hep B Surface Ab, Qual Reactive      Comments   Performed At: , [facility] [address], [place], [place], [id number]  Mehdi Howell MD, Phone: [phone]   Hep B Surface Ab, Qual:                              Non Reactive: Not immune to HBV infection.                              Equivocal: Unable to determine if anti-HBs                                         is present at levels consistent                                         with immunity.                               Reactive: Anti-HBs concentration detected                                         at greater than 10 mIU/mL.                                         Individual is considered to be                                         immune to infection with HBV.   Iron and TIBC 04/08/2025 15:25   Description Result Units Flags Range   Iron 113 ug/dL     Iron Bind.Cap.(TIBC) 350 ug/dL  250-450   Iron Saturation 32 %  15-55   UIBC 237 ug/dL  131-425   Comments   Performed At: [facility] [address], [place], [place], [id number]  Mehdi Howell MD, Phone: [phone]   Hepatic Function Panel (7) 04/08/2025 15:25   Description Result Units Flags Range   Bilirubin, Total 0.3 mg/dL  0.0-1.2   Bilirubin, Direct 0.11 mg/dL  0.00-0.40   AST (SGOT) 34 IU/L  0-40   ALT (SGPT) 46 IU/L H 0-32   Albumin 5.1 g/dL H 3.8-4.9   Alkaline Phosphatase 169 IU/L H    Protein, Total 7.3 g/dL  6.0-8.5   Comments   Performed At: [facility] [address], [place], [place], [id number]  Mehdi Howell MD, Phone: [phone]   CBC With Differential/Platelet 04/08/2025 15:25   Description Result Units Flags Range   Hemoglobin 13.6 g/dL  11.1-15.9   Hematocrit 40.5 %  34.0-46.6   MCV 96 fL  79-97   MCHC 33.6 g/dL  31.5-35.7   MCH 32.2 pg  26.6-33.0   RDW 12.4 %  11.7-15.4   Platelets 303 x10E3/uL  150-450   Neutrophils 46 %  Not Estab.   Lymphs 43 %  Not Estab.   Monocytes 10 %  Not Estab.   Eos 1 %  Not Estab.   Basos 0 %  Not Estab.    Neutrophils (Absolute) 2.6 x10E3/uL  1.4-7.0   Lymphs (Absolute) 2.4 x10E3/uL  0.7-3.1   Monocytes(Absolute) 0.6 x10E3/uL  0.1-0.9   Eos (Absolute) 0.0 x10E3/uL  0.0-0.4   Baso (Absolute) 0.0 x10E3/uL  0.0-0.2   Immature Grans (Abs) 0.0 x10E3/uL  0.0-0.1   Immature Granulocytes 0 %  Not Estab.   RBC 4.22 x10E6/uL  3.77-5.28   WBC 5.6 x10E3/uL  3.4-10.8   Comments   First Available First Sukhdevai lable First Available        Performed At: [facility], [facility] [address], [id number]  Mehdi Howell MD, Phone: [phone]     Prothrombin Time (PT) 04/08/2025 15:25   Description Result Units Flags Range   INR 0.9   0.9-1.2   Prothrombin Time 10.7 sec  9.1-12.0   Comments   First Available              Performed At: DV, [facility] [address], [id number]  Mehdi Howell MD, Phone: [phone]   INR:                 Reference interval is for non-anticoagulated patients.                                                                      .                 Suggested INR therapeutic range for Vitamin K                 antagonist therapy:                    Standard Dose (moderate intensity                                   therapeutic range):       2.0 - 3.0                    Higher intensity therapeutic range       2.5 - 3.5           Thank you.    Electronically signed by:  Tarun Mariscal DO 04/10/2025 09:33 AM  Document generated by:  Tarun Mariscal DO  04/10/2025  If your provider ordered multiple tests; the results may not become available at the same time.  If multiple test results are received within 14 days of one another, you may receive a duplicate.  cc:  Ginna Duong MD

## 2025-04-24 ENCOUNTER — HOSPITAL ENCOUNTER (OUTPATIENT)
Dept: CT IMAGING | Facility: CLINIC | Age: 55
Discharge: HOME | End: 2025-04-24
Attending: FAMILY MEDICINE
Payer: COMMERCIAL

## 2025-04-24 ENCOUNTER — MYC MEDICAL ADVICE (OUTPATIENT)
Dept: FAMILY MEDICINE | Facility: CLINIC | Age: 55
End: 2025-04-24

## 2025-04-24 DIAGNOSIS — E78.00 PURE HYPERCHOLESTEROLEMIA: ICD-10-CM

## 2025-04-24 PROCEDURE — 75571 CT HRT W/O DYE W/CA TEST: CPT

## 2025-04-25 ENCOUNTER — TRANSFERRED RECORDS (OUTPATIENT)
Dept: ADMINISTRATIVE | Facility: CLINIC | Age: 55
End: 2025-04-25
Payer: COMMERCIAL

## 2025-04-28 ENCOUNTER — OFFICE VISIT (OUTPATIENT)
Dept: OBGYN | Facility: CLINIC | Age: 55
End: 2025-04-28
Attending: OBSTETRICS & GYNECOLOGY
Payer: COMMERCIAL

## 2025-04-28 VITALS
SYSTOLIC BLOOD PRESSURE: 106 MMHG | DIASTOLIC BLOOD PRESSURE: 72 MMHG | HEIGHT: 62 IN | BODY MASS INDEX: 23.92 KG/M2 | HEART RATE: 61 BPM | WEIGHT: 130 LBS

## 2025-04-28 DIAGNOSIS — Z01.419 ENCOUNTER FOR GYNECOLOGICAL EXAMINATION WITHOUT ABNORMAL FINDING: Primary | ICD-10-CM

## 2025-04-28 DIAGNOSIS — Z13.820 SCREENING FOR OSTEOPOROSIS: ICD-10-CM

## 2025-04-28 DIAGNOSIS — N64.89 BILATERAL PENDULOUS BREASTS: ICD-10-CM

## 2025-04-28 ASSESSMENT — ANXIETY QUESTIONNAIRES
7. FEELING AFRAID AS IF SOMETHING AWFUL MIGHT HAPPEN: NOT AT ALL
4. TROUBLE RELAXING: NOT AT ALL
IF YOU CHECKED OFF ANY PROBLEMS ON THIS QUESTIONNAIRE, HOW DIFFICULT HAVE THESE PROBLEMS MADE IT FOR YOU TO DO YOUR WORK, TAKE CARE OF THINGS AT HOME, OR GET ALONG WITH OTHER PEOPLE: NOT DIFFICULT AT ALL
2. NOT BEING ABLE TO STOP OR CONTROL WORRYING: NOT AT ALL
GAD7 TOTAL SCORE: 0
1. FEELING NERVOUS, ANXIOUS, OR ON EDGE: NOT AT ALL
GAD7 TOTAL SCORE: 0
5. BEING SO RESTLESS THAT IT IS HARD TO SIT STILL: NOT AT ALL
GAD7 TOTAL SCORE: 0
7. FEELING AFRAID AS IF SOMETHING AWFUL MIGHT HAPPEN: NOT AT ALL
8. IF YOU CHECKED OFF ANY PROBLEMS, HOW DIFFICULT HAVE THESE MADE IT FOR YOU TO DO YOUR WORK, TAKE CARE OF THINGS AT HOME, OR GET ALONG WITH OTHER PEOPLE?: NOT DIFFICULT AT ALL
3. WORRYING TOO MUCH ABOUT DIFFERENT THINGS: NOT AT ALL
6. BECOMING EASILY ANNOYED OR IRRITABLE: NOT AT ALL

## 2025-04-28 NOTE — PATIENT INSTRUCTIONS
Thank you for trusting us with your care!   Please be aware, if you are on Mychart, you may see your results prior to your providers review. If labs are abnormal, we will call or message you on Sty[facility] with a follow up plan.    If you need to contact us for questions about:  Symptoms, Scheduling & Medical Questions; Non-urgent (2-3 day response) ReferMe message, Urgent (needing response today) [phone] (if after 3:30pm next day response)   Prescriptions: Please call your Pharmacy   Billing: [facility] [phone] or [facility]:[phone]

## 2025-04-28 NOTE — LETTER
2025       RE: Shannan Harris  [address]  [facility] [address]     Dear Colleague,    Thank you for referring your patient, Shannan Harris, to the [facility] [place] [facility]. Please see a copy of my visit note below.    Progress Note    SUBJECTIVE:  Shannan Harris is an 55 year old  , who requests a breast and pelvic exam.  Patient is followed by Dr. Duong for primary care.    Concerns today include: Interest in breast reduction. She has noted progressive increase in breast size and pain associated with this. She has pain in shoulders, neck and back. She is unable to run or do many forms of exercise due to discomfort. She also has [employer]azi [employer] heritage and wonders about risk reduction.     Is on vivelle dot for menopause symptoms. Is on lowest patch dosage and changes every 5-7 days. She underwent hysterectomy in her 30s. She started estrogen patch at age 45 for menopausal symptoms. No vulvar or vaginal concerns today.     Has elevated liver enzymes, is undergoing evaluation by GI. Possibly related to parasite found in [facility] where she grew up.     HISTORY:  Prescription Medications as of 2025         Rx Number Disp Refills Start End Last Dispensed Date Next Fill Date Owning Pharmacy    econazole nitrate 1 % external cream  85 g 4 2025 --   [id number] [place] [address] [address] AT [facility]    Sig: Apply topically daily.    Class: E-Prescribe    Route: Topical    estradiol (VIVELLE-DOT) 0.025 MG/24HR bi-weekly patch  24 patch 3 2024 --   [facility] #[id number] [place] [address] [address] AT [facility]    Sig: Place 1 patch onto the skin twice a week    Class: E-Prescribe    Route: Transdermal    famotidine (PEPCID) 40 MG tablet  90 tablet 1 2024 --   [facility] #[id number] [place] [address] [address] AT  [facility]    Sig: Take 1 tablet (40 mg) by mouth at bedtime.    Class: E-Prescribe    Route: Oral    imiquimod (ALDARA) 5 % external cream  24 packet 2 4/7/2025 --   [facility] #[id number] - [place], [place] - [id number] [place] [address]  AT [facility]    Sig: Apply once daily 5 days per week, for 6 weeks total to area on right posterior shoulder    Class: E-Prescribe    niacinamide 500 MG tablet  90 tablet 6 2/12/2025 --   [facility] [address] [address] [place] [address]  AT [facility]    Sig: Take 1 tablet (500 mg) by mouth 2 times daily (with meals). One pill twice daily    Class: E-Prescribe    Notes to Pharmacy: Use twice daily    Route: Oral    omeprazole (PRILOSEC) 20 MG DR capsule  90 capsule 1 11/25/2024 --   [facility] [place] [address]  AT [facility]    Sig: Take 1 capsule (20 mg) by mouth daily as needed (heartburn).    Class: E-Prescribe    Route: Oral    UNABLE TO FIND  -- --  --       Sig: daily. MEDICATION NAME: CDP choline 1000 mg    Class: Historical          Clinic-Administered Medications as of 4/29/2025         Dose Frequency Start End    lidocaine 1% with EPINEPHrine 1:100,000 injection 3 mL 3 mL ONCE 1/16/2020 --    Route: Intradermal          No Known Allergies  Immunization History   Administered Date(s) Administered     COVID-19 MONOVALENT 12+ (Pfizer) 11/12/2021     COVID-19 Monovalent 18+ (Moderna) 06/02/2022     COVID-19 Vaccine (Kajal) 03/05/2021     Flu, Unspecified 11/07/2022     INFLUENZA,TRIVALENT (FLUCELVAX) 10/02/2024     Influenza (H1N1) 12/21/2009     Influenza (IIV3) PF 10/28/2012, 10/08/2013, 10/01/2014     Influenza Vaccine >6 months,quad, PF 10/05/2017     Influenza Vaccine, 6+MO IM (QUADRIVALENT W/PRESERVATIVES) 10/01/2020     Influenza vaccine ages 6-35 months 10/01/2020     Influenza,INJ,MDCK,PF,Quad >6mo(Flucelvax) 09/19/2023     TDAP (Adacel,Boostrix)  10/02/2024     TDAP Vaccine (Boostrix) 2015     Zoster recombinant adjuvanted (Shingrix) 2022, 2025       OB History    Para Term  AB Living   0 0 0 0 0 0   SAB IAB Ectopic Multiple Live Births   0 0 0 0 0     Past Medical History:   Diagnosis Date     Gastroesophageal reflux disease      History of basal cell carcinoma      Hot flashes      Nondisplaced fracture of fifth left metatarsal bone with routine healing 2016     Pure hypercholesterolemia 2022     Past Surgical History:   Procedure Laterality Date     BIOPSY OF SKIN LESION       HYSTERECTOMY      for uterine myoma and painful periods; has ovaries     Family History   Problem Relation Age of Onset     Skin Cancer Mother         BCC     Hypertension Mother      Hyperlipidemia Mother      Gout Mother      Heart Disease Father      Skin Cancer Maternal Grandmother      Skin Cancer Brother      Cancer Brother         BCC     Brain Cancer Paternal Uncle      Pancreatic Cancer Paternal Uncle      Skin Cancer Maternal Cousin      Cancer - colorectal No family hx of      Breast Cancer No family hx of      Melanoma No family hx of      Social History     Socioeconomic History     Marital status:      Spouse name: None     Number of children: 0     Years of education: None     Highest education level: None   Occupational History     Occupation: research     Employer: [employer]   Tobacco Use     Smoking status: Never     Smokeless tobacco: Never   Vaping Use     Vaping status: Never Used   Substance and Sexual Activity     Alcohol use: Yes     Comment: a few drinks on the weekend     Drug use: No     Sexual activity: Yes     Partners: Male     Birth control/protection: Surgical     Comment: [employer]   Social History Narrative    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    One son, born by surrogacy in [place] in .    Getting  in 2017.       ROS    EXAM:  Blood pressure 106/72, pulse 61, height 1.575  "m (5' 2\"), weight 59 kg (130 lb), not currently breastfeeding. Body mass index is 23.78 kg/m .  General appearance: Pleasant female in no acute distress.     BREAST EXAM:  Breast: Without visible skin changes. No dimpling or lesions seen.   Breasts supple, non-tender with palpation, no dominant mass, nodularity, or nipple discharge noted bilaterally. Axillary nodes negative.      Abdomen: Soft, non-tender      ASSESSMENT:  Encounter Diagnoses   Name Primary?     Bilateral pendulous breasts Yes     Screening for osteoporosis      Encounter for gynecological examination without abnormal finding       55 year old Female Pelvic and Breast Exam  HRT Surveillance    PLAN:   Orders Placed This Encounter   Procedures     Pelvic and Breast Exam Procedure []     DX Bone Density     Adult Plastic Surgery  Referral   Refill vivelle dot    Return in one year/PRN for preventive care or problems/concerns.     Verbalized understanding and agreement with visit plan.    Answers submitted by the patient for this visit:  Patient Health Questionnaire (G7) (Submitted on 4/28/2025)  PABLO 7 TOTAL SCORE: 0      Again, thank you for allowing me to participate in the care of your patient.      Sincerely,    Nanette Blanton MD    "

## 2025-04-29 ENCOUNTER — PATIENT OUTREACH (OUTPATIENT)
Dept: CARE COORDINATION | Facility: CLINIC | Age: 55
End: 2025-04-29
Payer: COMMERCIAL

## 2025-04-29 NOTE — PROCEDURES
2025        Shannan Harris   [address] [place] [address]      Shannan Harris,  :  1970    Negative for a genetic disease called alpha 1 antitrypsin deficiency      Thank you.    Electronically signed by:  Tarun Mariscal DO 2025 07:29 AM  Document generated by:  Tarun Mariscal DO  2025  If your provider ordered multiple tests; the results may not become available at the same time.  If multiple test results are received within 14 days of one another, you may receive a duplicate.  cc:  Ginna Duong MD

## 2025-04-29 NOTE — PROGRESS NOTES
Progress Note    SUBJECTIVE:  Shannan Harris is an 55 year old  , who requests a breast and pelvic exam.  Patient is followed by Dr. Duong for primary care.    Concerns today include: Interest in breast reduction. She has noted progressive increase in breast size and pain associated with this. She has pain in shoulders, neck and back. She is unable to run or do many forms of exercise due to discomfort. She also has Ashkenazi Mandaeism heritage and wonders about risk reduction.     Is on vivelle dot for menopause symptoms. Is on lowest patch dosage and changes every 5-7 days. She underwent hysterectomy in her 30s. She started estrogen patch at age 45 for menopausal symptoms. No vulvar or vaginal concerns today.     Has elevated liver enzymes, is undergoing evaluation by GI. Possibly related to parasite found in [facility] where she grew up.     HISTORY:  Prescription Medications as of 2025         Rx Number Disp Refills Start End Last Dispensed Date Next Fill Date Owning Pharmacy    econazole nitrate 1 % external cream  85 g 4 2025 --   [facility] [address] [place] [address] [address] AT [facility]    Sig: Apply topically daily.    Class: E-Prescribe    Route: Topical    estradiol (VIVELLE-DOT) 0.025 MG/24HR bi-weekly patch  24 patch 3 2024 --   [facility] [address] [place] [address] [address] AT [facility]    Sig: Place 1 patch onto the skin twice a week    Class: E-Prescribe    Route: Transdermal    famotidine (PEPCID) 40 MG tablet  90 tablet 1 2024 --   [facility] [address] [place] [address] [address] AT [facility]    Sig: Take 1 tablet (40 mg) by mouth at bedtime.    Class: E-Prescribe    Route: Oral    imiquimod (ALDARA) 5 % external cream  24 packet 2 2025 --   [facility] [address] [place] [address] [address] AT [facility]    Sig: Apply once daily 5  days per week, for 6 weeks total to area on right posterior shoulder    Class: E-Prescribe    niacinamide 500 MG tablet  90 tablet 6 2025 --   [facility] [id number] [place] [address] [id number] AT [facility]    Sig: Take 1 tablet (500 mg) by mouth 2 times daily (with meals). One pill twice daily    Class: E-Prescribe    Notes to Pharmacy: Use twice daily    Route: Oral    omeprazole (PRILOSEC) 20 MG DR capsule  90 capsule 1 2024 --   [facility] #[id number] [address] AT [facility]    Sig: Take 1 capsule (20 mg) by mouth daily as needed (heartburn).    Class: E-Prescribe    Route: Oral    UNABLE TO FIND  -- --  --       Sig: daily. MEDICATION NAME: CDP choline 1000 mg    Class: Historical          Clinic-Administered Medications as of 2025         Dose Frequency Start End    lidocaine 1% with EPINEPHrine 1:100,000 injection 3 mL 3 mL ONCE 2020 --    Route: Intradermal          No Known Allergies  Immunization History   Administered Date(s) Administered    COVID-19 MONOVALENT 12+ (Pfizer) 2021    COVID-19 Monovalent 18+ (Moderna) 2022    COVID-19 Vaccine ([employer]) 2021    Flu, Unspecified 2022    INFLUENZA,TRIVALENT (FLUCELVAX) 10/02/2024    Influenza (H1N1) 2009    Influenza (IIV3) PF 10/28/2012, 10/08/2013, 10/01/2014    Influenza Vaccine >6 months,quad, PF 10/05/2017    Influenza Vaccine, 6+MO IM (QUADRIVALENT W/PRESERVATIVES) 10/01/2020    Influenza vaccine ages 6-35 months 10/01/2020    Influenza,INJ,MDCK,PF,Quad >6mo(Flucelvax) 2023    TDAP (Adacel,Boostrix) 10/02/2024    TDAP Vaccine (Boostrix) 2015    Zoster recombinant adjuvanted (Shingrix) 2022, 2025       OB History    Para Term  AB Living   0 0 0 0 0 0   SAB IAB Ectopic Multiple Live Births   0 0 0 0 0     Past Medical History:   Diagnosis Date    Gastroesophageal reflux disease      "History of basal cell carcinoma     Hot flashes     Nondisplaced fracture of fifth left metatarsal bone with routine healing 09/12/2016    Pure hypercholesterolemia 12/2/2022     Past Surgical History:   Procedure Laterality Date    BIOPSY OF SKIN LESION      HYSTERECTOMY  2009    for uterine myoma and painful periods; has ovaries     Family History   Problem Relation Age of Onset    Skin Cancer Mother         BCC    Hypertension Mother     Hyperlipidemia Mother     Gout Mother     Heart Disease Father     Skin Cancer Maternal Grandmother     Skin Cancer Brother     Cancer Brother         BCC    Brain Cancer Paternal Uncle     Pancreatic Cancer Paternal Uncle     Skin Cancer Maternal Cousin     Cancer - colorectal No family hx of     Breast Cancer No family hx of     Melanoma No family hx of      Social History     Socioeconomic History    Marital status:      Spouse name: None    Number of children: 0    Years of education: None    Highest education level: None   Occupational History    Occupation: research     Employer: [employer]   Tobacco Use    Smoking status: Never    Smokeless tobacco: Never   Vaping Use    Vaping status: Never Used   Substance and Sexual Activity    Alcohol use: Yes     Comment: a few drinks on the weekend    Drug use: No    Sexual activity: Yes     Partners: Male     Birth control/protection: Surgical     Comment: st   Social History Narrative    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    One son, born by surrogacy in [place] in 2014.    Getting  in June 2017.       ROS    EXAM:  Blood pressure 106/72, pulse 61, height 1.575 m (5' 2\"), weight 59 kg (130 lb), not currently breastfeeding. Body mass index is 23.78 kg/m .  General appearance: Pleasant female in no acute distress.     BREAST EXAM:  Breast: Without visible skin changes. No dimpling or lesions seen.   Breasts supple, non-tender with palpation, no dominant mass, nodularity, or nipple discharge noted " bilaterally. Axillary nodes negative.      Abdomen: Soft, non-tender      ASSESSMENT:  Encounter Diagnoses   Name Primary?    Bilateral pendulous breasts Yes    Screening for osteoporosis     Encounter for gynecological examination without abnormal finding       55 year old Female Pelvic and Breast Exam  HRT Surveillance    PLAN:   Orders Placed This Encounter   Procedures    Pelvic and Breast Exam Procedure []    DX Bone Density    Adult Plastic Surgery  Referral   Refill sita sloan    Return in one year/PRN for preventive care or problems/concerns.     Verbalized understanding and agreement with visit plan.    Answers submitted by the patient for this visit:  Patient Health Questionnaire (G7) (Submitted on 4/28/2025)  PABLO 7 TOTAL SCORE: 0

## 2025-05-01 ENCOUNTER — PATIENT OUTREACH (OUTPATIENT)
Dept: CARE COORDINATION | Facility: CLINIC | Age: 55
End: 2025-05-01

## 2025-05-01 ENCOUNTER — ANCILLARY PROCEDURE (OUTPATIENT)
Dept: BONE DENSITY | Facility: CLINIC | Age: 55
End: 2025-05-01
Attending: OBSTETRICS & GYNECOLOGY
Payer: COMMERCIAL

## 2025-05-01 DIAGNOSIS — Z13.820 SCREENING FOR OSTEOPOROSIS: ICD-10-CM

## 2025-05-01 PROCEDURE — 77080 DXA BONE DENSITY AXIAL: CPT | Performed by: INTERNAL MEDICINE

## 2025-05-04 DIAGNOSIS — N95.1 MENOPAUSAL HOT FLUSHES: ICD-10-CM

## 2025-05-06 ENCOUNTER — HOSPITAL ENCOUNTER (OUTPATIENT)
Dept: MRI IMAGING | Facility: CLINIC | Age: 55
Discharge: HOME | End: 2025-05-06
Attending: INTERNAL MEDICINE | Admitting: INTERNAL MEDICINE
Payer: COMMERCIAL

## 2025-05-06 ENCOUNTER — OFFICE VISIT (OUTPATIENT)
Dept: DERMATOLOGY | Facility: CLINIC | Age: 55
End: 2025-05-06
Payer: COMMERCIAL

## 2025-05-06 ENCOUNTER — TRANSFERRED RECORDS (OUTPATIENT)
Dept: ADMINISTRATIVE | Facility: CLINIC | Age: 55
End: 2025-05-06

## 2025-05-06 DIAGNOSIS — R74.8 ABNORMAL LIVER ENZYMES: ICD-10-CM

## 2025-05-06 DIAGNOSIS — R93.2 ABNORMAL LIVER ULTRASOUND: ICD-10-CM

## 2025-05-06 DIAGNOSIS — C44.91 SUPERFICIAL BASAL CELL CARCINOMA: Primary | ICD-10-CM

## 2025-05-06 PROCEDURE — 99213 OFFICE O/P EST LOW 20 MIN: CPT | Mod: GC | Performed by: DERMATOLOGY

## 2025-05-06 PROCEDURE — 255N000002 HC RX 255 OP 636: Performed by: STUDENT IN AN ORGANIZED HEALTH CARE EDUCATION/TRAINING PROGRAM

## 2025-05-06 PROCEDURE — 74183 MRI ABD W/O CNTR FLWD CNTR: CPT

## 2025-05-06 PROCEDURE — A9585 GADOBUTROL INJECTION: HCPCS | Performed by: STUDENT IN AN ORGANIZED HEALTH CARE EDUCATION/TRAINING PROGRAM

## 2025-05-06 PROCEDURE — 74183 MRI ABD W/O CNTR FLWD CNTR: CPT | Mod: 26 | Performed by: RADIOLOGY

## 2025-05-06 PROCEDURE — 1126F AMNT PAIN NOTED NONE PRSNT: CPT | Performed by: DERMATOLOGY

## 2025-05-06 RX ORDER — GADOBUTROL 604.72 MG/ML
7.5 INJECTION INTRAVENOUS ONCE
Status: COMPLETED | OUTPATIENT
Start: 2025-05-06 | End: 2025-05-06

## 2025-05-06 RX ADMIN — GADOBUTROL 6 ML: 604.72 INJECTION INTRAVENOUS at 08:14

## 2025-05-06 ASSESSMENT — PAIN SCALES - GENERAL: PAINLEVEL_OUTOF10: NO PAIN (0)

## 2025-05-06 NOTE — NURSING NOTE
Dermatology Rooming Note    Shannan Harris's goals for this visit include:   Chief Complaint   Patient presents with    Derm Problem     PT here for ilk and follow up biopsy of shoulder     Jamee Romero - EMT

## 2025-05-06 NOTE — LETTER
5/6/2025       RE: Shnanan Harris  [address]  [facility] [facility] [address]     Dear Colleague,    Thank you for referring your patient, Shannan Harris, to the [facility] DERMATOLOG[facility] [place] [facility]. Please see a copy of my visit note below.    [facility] Dermatology Note  Encounter Date: May 6, 2025  Office Visit     Dermatology Problem List:  # ** NUB, probable BCC, s/p 4 weeks of imiquimod as of 04/07/25, plan for 2 more weeks of treatment with clinical follow up in 4-6 weeks **   - right superior posterior shoulder   - right inferior posterior shoulder   1. Prior lesion to monitor  - R nasal tip, 3mm light brown macule with inferior erythema, resolved on exam 04/07/25   2. 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, s/p bx 10/10/24, s/p MMS 11/18/24              - ILK-20 0.2 ml 02/12/25  - early BCC (not biopsied), L calf, s/p Imiquimod 2/1/24  - sfBCC, R proximal anterior thigh s/p imiquimod 3/16/23  - sfBCC, L distal lateral thigh s/p imiquimod 3/16/23  - sfBCC, Right thigh s/p imiquimod 3/16/23  - superficial BCC, L ankle s/p bx 3/4/21 treated with Aldara   - superficial BCC, R anterior shin, s/p bx 3/4/21 treated with Aldara  - BCC, R medial lower leg, s/p Mohs 1/27/20  - BCC, R medial calf, s/p Mohs 1/27/20  - BCC, L shin medial, s/p ED&C 11/21/19  - BCC, L shin lateral, s/p ED&C 11/21/19  - BCC, L upper chest status post ED&C  - BCC, abdomen  - BCC, R flank  3. Tinea pedis  - Current Tx: econazole cream  4. Hx dysplastic nevus  - mod, central upper back s/p exc 8/11/16  5. AK s/p LN2     SHx: Grew up in [place]; no history of radiation; no known well-water history use.  FHx: Family with small numbers of BCCs, but not at the number she gets.    ____________________________________________    Assessment & Plan:    # Possible tumor on left shoulder.   -Wait until next  FBSE to have checked out the full body and potentially doing a biopsy in the two sites that have been treated with Aldara since March    # Bump on left lip  - Continue current healing regimen in terms of massaging (patient has not done yet)       Follow-up: PRN    Staff and Scribe:     staff    Scribe Disclosure:   I, Nuvia Gee, am serving as a scribe; to document services personally performed by Alexander Garcia MD -based on data collection and the provider's statements to me.     Provider Disclosure:  I agree with above History, Review of Systems, Physical exam and Plan.  I have reviewed the content of the documentation and have edited it as needed. I have personally performed the services documented here and the documentation accurately represents those services and the decisions I have made.      Electronically signed by:  Toño Herron MD      ____________________________________________    CC: Derm Problem (PT here for ilk and follow up biopsy of shoulder)    HPI:  Ms. Shannan Harris is a(n) 55 year old female who presents today as a return patient for follow up  Patient says she feels like the bump is still the same on the border between the lip and skin.   Patient notes the bump is still painful.   Patient is otherwise feeling well, without additional skin concerns.    Labs Reviewed:  N/A    Physical Exam:  Vitals: There were no vitals taken for this visit.  SKIN: Focused examination of vermilion lip was performed.  - Little bump on the border between the left vermilion lip and the skin  - Left shoulder two arithmetic macules with no other concerning dermatoscopic signs for pathological changes  - No other lesions of concern on areas examined.     Medications:  Current Outpatient Medications   Medication Sig Dispense Refill     estradiol (VIVELLE-DOT) 0.025 MG/24HR bi-weekly patch Place 1 patch onto the skin twice a week 24 patch 3     famotidine (PEPCID) 40 MG tablet Take 1 tablet (40 mg) by  mouth at bedtime. 90 tablet 1     niacinamide 500 MG tablet Take 1 tablet (500 mg) by mouth 2 times daily (with meals). One pill twice daily 90 tablet 6     omeprazole (PRILOSEC) 20 MG DR capsule Take 1 capsule (20 mg) by mouth daily as needed (heartburn). 90 capsule 1     econazole nitrate 1 % external cream Apply topically daily. (Patient not taking: Reported on 5/6/2025) 85 g 4     imiquimod (ALDARA) 5 % external cream Apply once daily 5 days per week, for 6 weeks total to area on right posterior shoulder (Patient not taking: Reported on 5/6/2025) 24 packet 2     UNABLE TO FIND daily. MEDICATION NAME: CDP choline 1000 mg (Patient not taking: Reported on 5/6/2025)       Current Facility-Administered Medications   Medication Dose Route Frequency Provider Last Rate Last Admin     lidocaine 1% with EPINEPHrine 1:100,000 injection 3 mL  3 mL Intradermal Once Cuate Ambrocio MD          Past Medical History:   Patient Active Problem List   Diagnosis     Multiple melanocytic nevi     Hot flashes     Neoplasm of uncertain behavior of skin     Tinea pedis of both feet     Deviated nasal septum     Seborrheic keratoses, inflamed     Superficial basal cell carcinoma     Hypertrophy of both inferior nasal turbinates     Gastric ulcer without hemorrhage or perforation     Pure hypercholesterolemia     History of basal cell carcinoma     Nasal obstruction     Nasal valve collapse     Liver lesion, left lobe     Past Medical History:   Diagnosis Date     Gastroesophageal reflux disease      History of basal cell carcinoma      Hot flashes      Nondisplaced fracture of fifth left metatarsal bone with routine healing 09/12/2016     Pure hypercholesterolemia 12/2/2022        CC No referring provider defined for this encounter. on close of this encounter.      Attestation signed by Alexander Garcia MD at 5/19/2025  8:58 AM (Updated):  Attending Attestation  I attest that the Fellow recorded the interview and exam that I personally  performed.  I have reviewed the note and edited it as necessary.  Presumed BCC on L shoulder treated with aldara by Dr. Ambrocio.  Both have achieved an exuberant response supporting that diagnosis.  There appears to be little residual.  Gen derm will monitor but counseled patient that there is no sign of recurrence at this time.    Alexander Garcia M.D.  Professor  Director of Dermatologic Surgery  Department of Dermatology  [facility]        Again, thank you for allowing me to participate in the care of your patient.      Sincerely,    Alexander Garcia MD

## 2025-05-06 NOTE — PROGRESS NOTES
[facility] Dermatology Note  Encounter Date: May 6, 2025  Office Visit     Dermatology Problem List:  # ** NUB, probable BCC, s/p 4 weeks of imiquimod as of 04/07/25, plan for 2 more weeks of treatment with clinical follow up in 4-6 weeks **   - right superior posterior shoulder   - right inferior posterior shoulder   1. Prior lesion to monitor  - R nasal tip, 3mm light brown macule with inferior erythema, resolved on exam 04/07/25   2. 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, s/p bx 10/10/24, s/p MMS 11/18/24              - ILK-20 0.2 ml 02/12/25  - early BCC (not biopsied), L calf, s/p Imiquimod 2/1/24  - sfBCC, R proximal anterior thigh s/p imiquimod 3/16/23  - sfBCC, L distal lateral thigh s/p imiquimod 3/16/23  - sfBCC, Right thigh s/p imiquimod 3/16/23  - superficial BCC, L ankle s/p bx 3/4/21 treated with Aldara   - superficial BCC, R anterior shin, s/p bx 3/4/21 treated with Aldara  - BCC, R medial lower leg, s/p Mohs 1/27/20  - BCC, R medial calf, s/p Mohs 1/27/20  - BCC, L shin medial, s/p ED&C 11/21/19  - BCC, L shin lateral, s/p ED&C 11/21/19  - BCC, L upper chest status post ED&C  - BCC, abdomen  - BCC, R flank  3. Tinea pedis  - Current Tx: econazole cream  4. Hx dysplastic nevus  - mod, central upper back s/p exc 8/11/16  5. AK s/p LN2     SHx: Grew up in [place]; no history of radiation; no known well-water history use.  FHx: Family with small numbers of BCCs, but not at the number she gets.    ____________________________________________    Assessment & Plan:    # Possible tumor on left shoulder.   -Wait until next FBSE to have checked out the full body and potentially doing a biopsy in the two sites that have been treated with Aldara since March    # Bump on left lip  - Continue current healing regimen in terms of massaging (patient has not done yet)       Follow-up: PRN    Staff and Scribe:     staff    Scribe Disclosure:   I,  Nuvia Gee, am serving as a scribe; to document services personally performed by Alexander Garcia MD -based on data collection and the provider's statements to me.     Provider Disclosure:  I agree with above History, Review of Systems, Physical exam and Plan.  I have reviewed the content of the documentation and have edited it as needed. I have personally performed the services documented here and the documentation accurately represents those services and the decisions I have made.      Electronically signed by:  Toño Herron MD      ____________________________________________    CC: Derm Problem (PT here for ilk and follow up biopsy of shoulder)    HPI:  Ms. Shannan Harris is a(n) 55 year old female who presents today as a return patient for follow up  Patient says she feels like the bump is still the same on the border between the lip and skin.   Patient notes the bump is still painful.   Patient is otherwise feeling well, without additional skin concerns.    Labs Reviewed:  N/A    Physical Exam:  Vitals: There were no vitals taken for this visit.  SKIN: Focused examination of vermilion lip was performed.  - Little bump on the border between the left vermilion lip and the skin  - Left shoulder two arithmetic macules with no other concerning dermatoscopic signs for pathological changes  - No other lesions of concern on areas examined.     Medications:  Current Outpatient Medications   Medication Sig Dispense Refill    estradiol (VIVELLE-DOT) 0.025 MG/24HR bi-weekly patch Place 1 patch onto the skin twice a week 24 patch 3    famotidine (PEPCID) 40 MG tablet Take 1 tablet (40 mg) by mouth at bedtime. 90 tablet 1    niacinamide 500 MG tablet Take 1 tablet (500 mg) by mouth 2 times daily (with meals). One pill twice daily 90 tablet 6    omeprazole (PRILOSEC) 20 MG DR capsule Take 1 capsule (20 mg) by mouth daily as needed (heartburn). 90 capsule 1    econazole nitrate 1 % external cream Apply topically  daily. (Patient not taking: Reported on 5/6/2025) 85 g 4    imiquimod (ALDARA) 5 % external cream Apply once daily 5 days per week, for 6 weeks total to area on right posterior shoulder (Patient not taking: Reported on 5/6/2025) 24 packet 2    UNABLE TO FIND daily. MEDICATION NAME: CDP choline 1000 mg (Patient not taking: Reported on 5/6/2025)       Current Facility-Administered Medications   Medication Dose Route Frequency Provider Last Rate Last Admin    lidocaine 1% with EPINEPHrine 1:100,000 injection 3 mL  3 mL Intradermal Once Cuate Ambrocio MD          Past Medical History:   Patient Active Problem List   Diagnosis    Multiple melanocytic nevi    Hot flashes    Neoplasm of uncertain behavior of skin    Tinea pedis of both feet    Deviated nasal septum    Seborrheic keratoses, inflamed    Superficial basal cell carcinoma    Hypertrophy of both inferior nasal turbinates    Gastric ulcer without hemorrhage or perforation    Pure hypercholesterolemia    History of basal cell carcinoma    Nasal obstruction    Nasal valve collapse    Liver lesion, left lobe     Past Medical History:   Diagnosis Date    Gastroesophageal reflux disease     History of basal cell carcinoma     Hot flashes     Nondisplaced fracture of fifth left metatarsal bone with routine healing 09/12/2016    Pure hypercholesterolemia 12/2/2022        CC No referring provider defined for this encounter. on close of this encounter.

## 2025-05-08 RX ORDER — ESTRADIOL 0.03 MG/D
1 FILM, EXTENDED RELEASE TRANSDERMAL
Qty: 24 PATCH | Refills: 3 | Status: SHIPPED | OUTPATIENT
Start: 2025-05-08

## 2025-05-08 NOTE — PROCEDURES
2025        Shannan Harris   [address]  [place] [address]      Shannan Harris,  :  1970    MRI of the liver 2025 shows a noncirrhotic morphology of your liver without evidence of significant iron deposition or fat deposition.  Ultrasound findings do not correlate to any abnormality on MRI.  However, you are noted to have a separate 4 mm indeterminate lesion for which a repeat MRI is recommended in 3-6 months.  I have ordered another MRI in 3 months but will leave a message for Dr. Mariscal to review and make any alternate recommendations.      Thank you.    Electronically signed by:  Bertha LEÓN 2025 10:19 AM  Document generated by:  Bertha LEÓN  2025  If your provider ordered multiple tests; the results may not become available at the same time.  If multiple test results are received within 14 days of one another, you may receive a duplicate.  cc:  Ginna Duong MD

## 2025-05-28 NOTE — PROGRESS NOTES
History of Present Illness - Shannan Harris is a 55 year old female presenting in clinic today for a recheck on Patient presents with:  Surgical Followup: Septoplasty 06/06/25  Patient status post septoplasty, submucous resection of turbinates and left nasal valve repair for nasal valve collapse using autologous batten graft.  Overall quite feeling plugged with some discomfort.        BP Readings from Last 1 Encounters:   04/28/25 106/72       BP noted to be well controlled today in office.     Shannan IS NOT a smoker/uses chewing tobacco.        Past Medical History -   Past Medical History:   Diagnosis Date    Gastroesophageal reflux disease     History of basal cell carcinoma     Hot flashes     Nondisplaced fracture of fifth left metatarsal bone with routine healing 09/12/2016    Pure hypercholesterolemia 12/2/2022       Current Medications -   Current Outpatient Medications:     econazole nitrate 1 % external cream, Apply topically daily. (Patient not taking: Reported on 5/6/2025), Disp: 85 g, Rfl: 4    estradiol (VIVELLE-DOT) 0.025 MG/24HR bi-weekly patch, APPLY 1 PATCH TOPICALLY TO THE SKIN 2 TIMES A WEEK, Disp: 24 patch, Rfl: 3    famotidine (PEPCID) 40 MG tablet, Take 1 tablet (40 mg) by mouth at bedtime., Disp: 90 tablet, Rfl: 1    imiquimod (ALDARA) 5 % external cream, Apply once daily 5 days per week, for 6 weeks total to area on right posterior shoulder (Patient not taking: Reported on 5/6/2025), Disp: 24 packet, Rfl: 2    niacinamide 500 MG tablet, Take 1 tablet (500 mg) by mouth 2 times daily (with meals). One pill twice daily, Disp: 90 tablet, Rfl: 6    omeprazole (PRILOSEC) 20 MG DR capsule, Take 1 capsule (20 mg) by mouth daily as needed (heartburn)., Disp: 90 capsule, Rfl: 1    UNABLE TO FIND, daily. MEDICATION NAME: CDP choline 1000 mg (Patient not taking: Reported on 5/6/2025), Disp: , Rfl:     Current Facility-Administered Medications:     lidocaine 1% with EPINEPHrine 1:100,000 injection 3 mL,  3 mL, Intradermal, Once, Cuate Ambrocio MD    Allergies - No Known Allergies    Social History -   Social History     Socioeconomic History    Marital status:     Number of children: 0   Occupational History    Occupation: research     Employer: [employer]   Tobacco Use    Smoking status: Never    Smokeless tobacco: Never   Vaping Use    Vaping status: Never Used   Substance and Sexual Activity    Alcohol use: Yes     Comment: a few drinks on the weekend    Drug use: No    Sexual activity: Yes     Partners: Male     Birth control/protection: Surgical     Comment: hyst   Social History Narrative    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    One son, born by surrogacy in [place] in 2014.    Getting  in June 2017.       Family History -   Family History   Problem Relation Age of Onset    Skin Cancer Mother         BCC    Hypertension Mother     Hyperlipidemia Mother     Gout Mother     Heart Disease Father     Skin Cancer Maternal Grandmother     Skin Cancer Brother     Cancer Brother         BCC    Brain Cancer Paternal Uncle     Pancreatic Cancer Paternal Uncle     Skin Cancer Maternal Cousin     Cancer - colorectal No family hx of     Breast Cancer No family hx of     Melanoma No family hx of        Review of Systems - As per HPI and PMHx, otherwise review of system review of the head and neck negative. Otherwise 10+ review of system is negative    Physical Exam  There were no vitals taken for this visit.  BMI: There is no height or weight on file to calculate BMI.    General - The patient is well nourished and well developed, and appears to have good nutritional status.  Alert and oriented to person and place, answers questions and cooperates with examination appropriately.    SKIN - No suspicious lesions or rashes.  Respiration - No respiratory distress.  Head and Face - Normocephalic and atraumatic, with no gross asymmetry noted of the contour of the facial features.  The facial nerve is  intact, with strong symmetric movements.    Voice and Breathing - The patient was breathing comfortably without the use of accessory muscles. The patients voice was clear and strong, and had appropriate pitch and quality.    Ears - Bilateral pinna and EACs with normal appearing overlying skin. Tympanic membrane intact with good mobility on pneumatic otoscopy bilaterally. Bony landmarks of the ossicular chain are normal. The tympanic membranes are normal in appearance. No retraction, perforation, or masses.  No fluid or purulence was seen in the external canal or the middle ear.     Eyes - Extraocular movements intact.  Sclera were not icteric or injected, conjunctiva were pink and moist.      Nose - External contour is asymmetric, with still some prominence of the batten graft on the left side but the disappearance of medial defect that the patient had previously involving portion of her upper lateral cartilage as well as the lateral aspect of the body of the lower lateral cartilage.  Nasal mucosa is pink and moist with no abnormal mucus.  The septum was midline and non-obstructive, turbinates of normal size and position.  No polyps, masses, or purulence noted on examination.    Neuro - Nonfocal neuro exam is normal, CN 2 through 12 intact, normal gait and muscle tone.      Performed in clinic today:  Nasal septal splints removed without difficulty today.    A/P - Shannan Harris is a 55 year old female Patient presents with:  Surgical Followup: Septoplasty 06/06/25    Patient had nasal septal splints removed today.  Will instruct her on properly massaging around the cartilage that was implanted to see if it feels in the space in the most symmetrical fashion.  If patient is still feeling a prominence of the cartilage will start some triamcinolone injections in the month.  Will try to preserve the cartilage in order to maintain left internal nasal valve patency    Shannan should follow up in 1 month.            Dionte  MD Emy

## 2025-05-29 ENCOUNTER — OFFICE VISIT (OUTPATIENT)
Dept: FAMILY MEDICINE | Facility: CLINIC | Age: 55
End: 2025-05-29
Payer: COMMERCIAL

## 2025-05-29 VITALS
WEIGHT: 136.9 LBS | TEMPERATURE: 97.6 F | SYSTOLIC BLOOD PRESSURE: 100 MMHG | OXYGEN SATURATION: 97 % | HEART RATE: 65 BPM | HEIGHT: 63 IN | BODY MASS INDEX: 24.26 KG/M2 | RESPIRATION RATE: 16 BRPM | DIASTOLIC BLOOD PRESSURE: 65 MMHG

## 2025-05-29 DIAGNOSIS — R74.8 ELEVATED LIVER ENZYMES: ICD-10-CM

## 2025-05-29 DIAGNOSIS — K21.00 GASTROESOPHAGEAL REFLUX DISEASE WITH ESOPHAGITIS WITHOUT HEMORRHAGE: ICD-10-CM

## 2025-05-29 DIAGNOSIS — J34.89 NASAL OBSTRUCTION: ICD-10-CM

## 2025-05-29 DIAGNOSIS — M81.6 LOCALIZED OSTEOPOROSIS WITHOUT CURRENT PATHOLOGICAL FRACTURE: ICD-10-CM

## 2025-05-29 DIAGNOSIS — Z01.818 PRE-OP EXAM: Primary | ICD-10-CM

## 2025-05-29 DIAGNOSIS — E78.00 PURE HYPERCHOLESTEROLEMIA: ICD-10-CM

## 2025-05-29 PROBLEM — M85.89 OSTEOPENIA OF MULTIPLE SITES: Status: ACTIVE | Noted: 2025-05-29

## 2025-05-29 ASSESSMENT — PAIN SCALES - GENERAL: PAINLEVEL_OUTOF10: NO PAIN (0)

## 2025-05-29 NOTE — PROGRESS NOTES
Preoperative Evaluation  [address] [address]  Phone: [phone]  Primary Provider: Nanette Blanton MD  Pre-op Performing Provider: Gorge Mathew MD  May 29, 2025                No data to display              Fax number for surgical facility: Note does not need to be faxed, will be available electronically in Epic.    Assessment & Plan     1.  Preop physical exam completed.  Patient is medically optimized to undergo the planned surgical procedure.  2.  Nasal obstruction with evidence of deviated nasal septum and hypertrophy of both inferior nasal turbinates for which patient is to undergo surgery.  3.  Hypercholesterolemia with cholesterol 317 and  measured on 3/12/2025.  Patient advised to make appointment with primary provider to discuss treatment options.  4.  Osteoporosis, localized.  DEXA scan 5/1/2025 showing T-score value of -2.6 left femoral neck.  Advised to discuss treatment option with PCP.  Currently she is on estrogen replacement therapy.  5.  Elevated liver enzyme of longstanding with a negative workup at [facility] according to the patient.  No evidence of fatty liver disease.   6.  Gastroesophageal reflux symptoms for which she uses omeprazole and occasionally famotidine.      The proposed surgical procedure is considered LOW risk.    Recommendation  Approval given to proceed with proposed procedure, without further diagnostic evaluation.    Rhona Campbell is a 55 year old, presenting for the following:  Pre-Op Exam (6-6-25, Dr. Quevedo, SEPTOPLASTY, NOSE, WITH TURBINOPLASTY and left nasal valve reconstruction with autologous batten graft, [facility] [place]          5/29/2025    12:43 PM   Additional Questions   Roomed by Tonya GAY   Accompanied by self     HPI:     55 young lady is to undergo nasal surgery for nasal obstruction.  She has had difficulty of nasal breathing for a number of years.  Denies shortness of breath or chest  pain.  Offers no new concerns.     Planning    Health Care Directive received at today's visit.  Forwarded to [facility].      Patient Active Problem List    Diagnosis Date Noted    Liver lesion, left lobe 04/08/2025     Priority: Medium     4/2025 - Follow-up US in 3-6 months      Nasal obstruction 03/17/2025     Priority: Medium    Nasal valve collapse 03/17/2025     Priority: Medium    History of basal cell carcinoma 10/31/2024     Priority: Medium    Pure hypercholesterolemia 12/02/2022     Priority: Medium    Hypertrophy of both inferior nasal turbinates 06/23/2021     Priority: Medium     Added automatically from request for surgery [id number]      Superficial basal cell carcinoma 04/08/2021     Priority: Medium    Seborrheic keratoses, inflamed 01/25/2020     Priority: Medium    Gastric ulcer without hemorrhage or perforation 11/13/2018     Priority: Medium    Deviated nasal septum 01/24/2018     Priority: Medium    Neoplasm of uncertain behavior of skin 06/07/2016     Priority: Medium    Tinea pedis of both feet 06/07/2016     Priority: Medium    Hot flashes      Priority: Medium    Multiple melanocytic nevi 10/18/2011     Priority: Medium     (Problem list name updated by automated process. Provider to review and confirm.)        Past Medical History:   Diagnosis Date    Gastroesophageal reflux disease     History of basal cell carcinoma     Hot flashes     Nondisplaced fracture of fifth left metatarsal bone with routine healing 09/12/2016    Pure hypercholesterolemia 12/2/2022     Past Surgical History:   Procedure Laterality Date    BIOPSY OF SKIN LESION      HYSTERECTOMY  2009    for uterine myoma and painful periods; has ovaries     Current Outpatient Medications   Medication Sig Dispense Refill    econazole nitrate 1 % external cream Apply topically daily. (Patient not taking: Reported on 5/6/2025) 85 g 4    estradiol (VIVELLE-DOT) 0.025 MG/24HR bi-weekly patch APPLY 1 PATCH TOPICALLY TO THE SKIN 2  "TIMES A WEEK 24 patch 3    famotidine (PEPCID) 40 MG tablet Take 1 tablet (40 mg) by mouth at bedtime. 90 tablet 1    imiquimod (ALDARA) 5 % external cream Apply once daily 5 days per week, for 6 weeks total to area on right posterior shoulder (Patient not taking: Reported on 5/6/2025) 24 packet 2    niacinamide 500 MG tablet Take 1 tablet (500 mg) by mouth 2 times daily (with meals). One pill twice daily 90 tablet 6    omeprazole (PRILOSEC) 20 MG DR capsule Take 1 capsule (20 mg) by mouth daily as needed (heartburn). 90 capsule 1    UNABLE TO FIND daily. MEDICATION NAME: CDP choline 1000 mg (Patient not taking: Reported on 5/6/2025)         No Known Allergies     Social History     Tobacco Use    Smoking status: Never    Smokeless tobacco: Never   Substance Use Topics    Alcohol use: Yes     Comment: a few drinks on the weekend     Family History   Problem Relation Age of Onset    Skin Cancer Mother         BCC    Hypertension Mother     Hyperlipidemia Mother     Gout Mother     Heart Disease Father     Skin Cancer Maternal Grandmother     Skin Cancer Brother     Cancer Brother         BCC    Brain Cancer Paternal Uncle     Pancreatic Cancer Paternal Uncle     Skin Cancer Maternal Cousin     Cancer - colorectal No family hx of     Breast Cancer No family hx of     Melanoma No family hx of      History   Drug Use No             Review of Systems  Constitutional, HEENT, cardiovascular, pulmonary, GI, , musculoskeletal, neuro, skin, endocrine and psych systems are negative, except as otherwise noted.    Objective    /65 (BP Location: Right arm, Patient Position: Sitting, Cuff Size: Adult Regular)   Pulse 65   Temp 97.6  F (36.4  C) (Oral)   Resp 16   Ht 1.594 m (5' 2.75\")   Wt 62.1 kg (136 lb 14.4 oz)   SpO2 97%   BMI 24.44 kg/m     Estimated body mass index is 24.44 kg/m  as calculated from the following:    Height as of this encounter: 1.594 m (5' 2.75\").    Weight as of this encounter: 62.1 kg (136 " lb 14.4 oz).  Physical Exam  GENERAL: alert and no distress  HENT: ear canals and TM's normal, nose and mouth without ulcers or lesions  NECK: no adenopathy, no asymmetry, masses, or scars  RESP: lungs clear to auscultation - no rales, rhonchi or wheezes  CV: regular rate and rhythm, normal S1 S2, no S3 or S4, no murmur, click or rub, no peripheral edema  ABDOMEN: soft, nontender, no hepatosplenomegaly, no masses and bowel sounds normal  MS: no gross musculoskeletal defects noted, no edema  NEURO: Normal strength and tone, mentation intact and speech normal    Recent Labs   Lab Test 04/08/25  1525 04/01/25  1444 03/12/25  1111   HGB  --  13.6  --    PLT  --  267  --    INR 0.9  --   --    A1C  --   --  5.5        Diagnostics  No labs were ordered during this visit.   No EKG required for low risk surgery (cataract, skin procedure, breast biopsy, etc).  No EKG required, no history of coronary heart disease, significant arrhythmia, peripheral arterial disease or other structural heart disease.    Revised Cardiac Risk Index (RCRI)  The patient has the following serious cardiovascular risks for perioperative complications:   - No serious cardiac risks = 0 points     RCRI Interpretation: 0 points: Class I (very low risk - 0.4% complication rate)         Signed Electronically by: Gorge Mathew MD  A copy of this evaluation report is provided to the requesting physician.

## 2025-06-05 ENCOUNTER — OFFICE VISIT (OUTPATIENT)
Dept: DERMATOLOGY | Facility: CLINIC | Age: 55
End: 2025-06-05
Attending: DERMATOLOGY
Payer: COMMERCIAL

## 2025-06-05 ENCOUNTER — ANESTHESIA EVENT (OUTPATIENT)
Dept: SURGERY | Facility: AMBULATORY SURGERY CENTER | Age: 55
End: 2025-06-05
Payer: COMMERCIAL

## 2025-06-05 DIAGNOSIS — D22.9 MULTIPLE MELANOCYTIC NEVI: ICD-10-CM

## 2025-06-05 DIAGNOSIS — Z85.828 HISTORY OF BASAL CELL CANCER: Primary | ICD-10-CM

## 2025-06-05 PROCEDURE — 1126F AMNT PAIN NOTED NONE PRSNT: CPT | Performed by: DERMATOLOGY

## 2025-06-05 PROCEDURE — 99213 OFFICE O/P EST LOW 20 MIN: CPT | Performed by: DERMATOLOGY

## 2025-06-05 ASSESSMENT — PAIN SCALES - GENERAL: PAINLEVEL_OUTOF10: NO PAIN (0)

## 2025-06-05 NOTE — PROGRESS NOTES
[facility] Dermatology Note  Encounter Date: June 5, 2025  Office Visit      Dermatology Problem List:  1. Lesion to monitor  - left upper cutaneous lip, 1mm pearly papule -- biopsied 10/10/24, r/o NMSC  - R nasal tip, 3mm light brown macule with inferior erythema, c/f early BCC  2. Hx of NMSC, taking niacinamide  - early BCC (not biopsied), L calf, s/p Imiquimod 2/1/24  - sfBCC, R proximal anterior thigh s/p imiquimod 3/16/23  - sfBCC, L distal lateral thigh s/p imiquimod 3/16/23  - sfBCC, Right thigh s/p imiquimod 3/16/23  - superficial BCC, L ankle s/p bx 03/4/21 treated with Aldara   - superficial BCC, R anterior shin s/p bx 03/04/21 treated with Aldara  - BCC, R medial lower leg, s/p Mohs 1/27/20  - BCC, R medial calf, s/p Mohs 1/27/20  - BCC, L shin medial, s/p ED&C 11/21/19  - BCC, L shin lateral, s/p ED&C 11/21/19  - BCC, L upper chest status post ED&C  - BCC, abdomen  - BCC, R flank  - History: grew up in [place], no history of radiation, no known well-water history use, family with small numbers of BCCs not at the number she gets   3. Tinea pedis  - current tx: econazole cream  4. Hx dysplastic nevus  - mod, central upper back s/p exc 8/11/2016  5. AK, LN2        ____________________________________________     Assessment & Plan:     # Left upper cutaneous lip infundibulocystic BCC, s/p bx 10/10/24, Mohs 11/18/24.  - Reassurance as to benign appearance today  - Patient wishes to revisit possibility of laser scar revision - derm surgery referral placed today    # Right posterior shoulder suspected BCCs x2, treated with imiquimod at home x6 weeks.  Clinically the treated areas have the appearance of scar only, with no features of BCC on gross exam or dermoscopy today.  - Will continue to follow clinically    # 3 discrete pink papules on left calf and left achilles; DDx scars vs early superficial BCC.  Photographs taken today, will follow closely clinically.  - Followup in 6 months, re-eval  at this visit     # History of nonmelanoma skin cancer, no clincial evidence of recurrence.   - ABCDEs: Counseled ABCDEs of melanoma: Asymmetry, Border (irregularity), Color (not uniform, changes in color), Diameter (greater than 6 mm which is about the size of a pencil eraser), and Evolving (any changes in preexisting moles).  - Monitor: Patient to continue monitoring at home and will contact the clinic for any changes.  - Sun protection: Counseled SPF30+ sunscreen, UPF clothing, sun avoidance, tanning bed avoidance.     # Multiple benign nevi  # Cherry angiomas  - Discussed benign etiology, reassurance provided.      Procedures Performed:   None      Follow-up: 6 month(s) in-person, or earlier for new or changing lesions    Cuate Ambrocio MD  Dermatology Attending  ____________________________________________     CC: Skin Check (Follow up on the BCC. Spot on the upper lip, and nose)     HPI:  Ms. Shannan Harris is a(n) 55 year old female who presents today as a return patient for her 6-month FBSE.    Has been treating two spots on shoulder with Aldara for past 6 weeks.     Started taking niacinamide twice daily, no concerns.     Her mom's cousin who is about her age was diagnosed with oral invasive squamous carcinoma.      Patient is otherwise feeling well, without additional skin concerns.     Labs:  None.     Physical Exam:  Vitals: There were no vitals taken for this visit.  SKIN: Full skin, which includes the head/face, both arms, chest, back, abdomen,both legs, genitalia and/or groin buttocks, digits and/or nails, was examined.  - trunk, extremities: multiple uniformly pigmented light brown papules, 1-2mm bright red dome-shaped papules  - three pink flat topped 2-3mm papules on calf  - well healed scars with NER   - No other lesions of concern on areas examined.

## 2025-06-05 NOTE — NURSING NOTE
Dermatology Rooming Note    Shannan Harris's goals for this visit include:   Chief Complaint   Patient presents with    Skin Check     FBSE - two spots were self-treated on R shoulder with cream, would like them checked. Has noticed a slightly painful bump near Mohs site, is interested in laser treatment for that area. Is wondering if she still should take niacinamide daily, interested in 90 day rx rather than 30 day.     Meg Youngblood CA

## 2025-06-06 ENCOUNTER — HOSPITAL ENCOUNTER (OUTPATIENT)
Facility: AMBULATORY SURGERY CENTER | Age: 55
Discharge: HOME | End: 2025-06-06
Attending: OTOLARYNGOLOGY
Payer: COMMERCIAL

## 2025-06-06 ENCOUNTER — ANESTHESIA (OUTPATIENT)
Dept: SURGERY | Facility: AMBULATORY SURGERY CENTER | Age: 55
End: 2025-06-06
Payer: COMMERCIAL

## 2025-06-06 ENCOUNTER — TELEPHONE (OUTPATIENT)
Dept: DERMATOLOGY | Facility: CLINIC | Age: 55
End: 2025-06-06

## 2025-06-06 VITALS
WEIGHT: 136 LBS | TEMPERATURE: 97.8 F | SYSTOLIC BLOOD PRESSURE: 110 MMHG | HEART RATE: 75 BPM | OXYGEN SATURATION: 100 % | RESPIRATION RATE: 14 BRPM | DIASTOLIC BLOOD PRESSURE: 69 MMHG | HEIGHT: 63 IN | BODY MASS INDEX: 24.1 KG/M2

## 2025-06-06 DIAGNOSIS — G89.18 POSTOPERATIVE PAIN: Primary | ICD-10-CM

## 2025-06-06 DIAGNOSIS — Z98.890 POSTOPERATIVE NAUSEA: ICD-10-CM

## 2025-06-06 DIAGNOSIS — R11.0 POSTOPERATIVE NAUSEA: ICD-10-CM

## 2025-06-06 DIAGNOSIS — T17.1XXA NASAL FOREIGN BODY, INITIAL ENCOUNTER: ICD-10-CM

## 2025-06-06 PROCEDURE — 30465 REPAIR NASAL STENOSIS: CPT | Mod: 52 | Performed by: OTOLARYNGOLOGY

## 2025-06-06 PROCEDURE — 30520 REPAIR OF NASAL SEPTUM: CPT | Performed by: OTOLARYNGOLOGY

## 2025-06-06 PROCEDURE — 30140 RESECT INFERIOR TURBINATE: CPT | Mod: 50 | Performed by: OTOLARYNGOLOGY

## 2025-06-06 PROCEDURE — 30520 REPAIR OF NASAL SEPTUM: CPT | Performed by: ANESTHESIOLOGY

## 2025-06-06 PROCEDURE — 30520 REPAIR OF NASAL SEPTUM: CPT | Performed by: NURSE ANESTHETIST, CERTIFIED REGISTERED

## 2025-06-06 RX ORDER — GLYCOPYRROLATE 0.2 MG/ML
INJECTION, SOLUTION INTRAMUSCULAR; INTRAVENOUS PRN
Status: DISCONTINUED | OUTPATIENT
Start: 2025-06-06 | End: 2025-06-06

## 2025-06-06 RX ORDER — ONDANSETRON 4 MG/1
4 TABLET, ORALLY DISINTEGRATING ORAL EVERY 30 MIN PRN
Status: DISCONTINUED | OUTPATIENT
Start: 2025-06-06 | End: 2025-06-07 | Stop reason: HOSPADM

## 2025-06-06 RX ORDER — ONDANSETRON 2 MG/ML
INJECTION INTRAMUSCULAR; INTRAVENOUS PRN
Status: DISCONTINUED | OUTPATIENT
Start: 2025-06-06 | End: 2025-06-06

## 2025-06-06 RX ORDER — NALOXONE HYDROCHLORIDE 0.4 MG/ML
0.1 INJECTION, SOLUTION INTRAMUSCULAR; INTRAVENOUS; SUBCUTANEOUS
Status: DISCONTINUED | OUTPATIENT
Start: 2025-06-06 | End: 2025-06-07 | Stop reason: HOSPADM

## 2025-06-06 RX ORDER — SODIUM CHLORIDE, SODIUM LACTATE, POTASSIUM CHLORIDE, CALCIUM CHLORIDE 600; 310; 30; 20 MG/100ML; MG/100ML; MG/100ML; MG/100ML
INJECTION, SOLUTION INTRAVENOUS CONTINUOUS
Status: DISCONTINUED | OUTPATIENT
Start: 2025-06-06 | End: 2025-06-06 | Stop reason: HOSPADM

## 2025-06-06 RX ORDER — DEXAMETHASONE SODIUM PHOSPHATE 10 MG/ML
4 INJECTION, SOLUTION INTRAMUSCULAR; INTRAVENOUS
Status: DISCONTINUED | OUTPATIENT
Start: 2025-06-06 | End: 2025-06-07 | Stop reason: HOSPADM

## 2025-06-06 RX ORDER — ONDANSETRON 4 MG/1
4 TABLET, ORALLY DISINTEGRATING ORAL EVERY 8 HOURS PRN
Qty: 6 TABLET | Refills: 0 | Status: SHIPPED | OUTPATIENT
Start: 2025-06-06

## 2025-06-06 RX ORDER — PROPOFOL 10 MG/ML
INJECTION, EMULSION INTRAVENOUS CONTINUOUS PRN
Status: DISCONTINUED | OUTPATIENT
Start: 2025-06-06 | End: 2025-06-06

## 2025-06-06 RX ORDER — HYDROCODONE BITARTRATE AND ACETAMINOPHEN 5; 325 MG/1; MG/1
1-2 TABLET ORAL EVERY 4 HOURS PRN
Qty: 20 TABLET | Refills: 0 | Status: SHIPPED | OUTPATIENT
Start: 2025-06-06

## 2025-06-06 RX ORDER — OXYMETAZOLINE HYDROCHLORIDE 0.05 G/100ML
SPRAY NASAL PRN
Status: DISCONTINUED | OUTPATIENT
Start: 2025-06-06 | End: 2025-06-06 | Stop reason: HOSPADM

## 2025-06-06 RX ORDER — SODIUM CHLORIDE, SODIUM LACTATE, POTASSIUM CHLORIDE, CALCIUM CHLORIDE 600; 310; 30; 20 MG/100ML; MG/100ML; MG/100ML; MG/100ML
INJECTION, SOLUTION INTRAVENOUS CONTINUOUS
Status: DISCONTINUED | OUTPATIENT
Start: 2025-06-06 | End: 2025-06-07 | Stop reason: HOSPADM

## 2025-06-06 RX ORDER — ONDANSETRON 2 MG/ML
4 INJECTION INTRAMUSCULAR; INTRAVENOUS EVERY 30 MIN PRN
Status: DISCONTINUED | OUTPATIENT
Start: 2025-06-06 | End: 2025-06-07 | Stop reason: HOSPADM

## 2025-06-06 RX ORDER — HYDROMORPHONE HYDROCHLORIDE 1 MG/ML
0.2 INJECTION, SOLUTION INTRAMUSCULAR; INTRAVENOUS; SUBCUTANEOUS EVERY 5 MIN PRN
Status: DISCONTINUED | OUTPATIENT
Start: 2025-06-06 | End: 2025-06-07 | Stop reason: HOSPADM

## 2025-06-06 RX ORDER — LIDOCAINE HYDROCHLORIDE AND EPINEPHRINE 10; 10 MG/ML; UG/ML
INJECTION, SOLUTION INFILTRATION; PERINEURAL PRN
Status: DISCONTINUED | OUTPATIENT
Start: 2025-06-06 | End: 2025-06-06 | Stop reason: HOSPADM

## 2025-06-06 RX ORDER — OXYCODONE HYDROCHLORIDE 5 MG/1
10 TABLET ORAL
Status: DISCONTINUED | OUTPATIENT
Start: 2025-06-06 | End: 2025-06-07 | Stop reason: HOSPADM

## 2025-06-06 RX ORDER — OXYCODONE HYDROCHLORIDE 5 MG/1
5 TABLET ORAL
Status: COMPLETED | OUTPATIENT
Start: 2025-06-06 | End: 2025-06-06

## 2025-06-06 RX ORDER — LIDOCAINE HYDROCHLORIDE 20 MG/ML
INJECTION, SOLUTION INFILTRATION; PERINEURAL PRN
Status: DISCONTINUED | OUTPATIENT
Start: 2025-06-06 | End: 2025-06-06

## 2025-06-06 RX ORDER — HYDRALAZINE HYDROCHLORIDE 20 MG/ML
2.5-5 INJECTION INTRAMUSCULAR; INTRAVENOUS EVERY 10 MIN PRN
Status: DISCONTINUED | OUTPATIENT
Start: 2025-06-06 | End: 2025-06-07 | Stop reason: HOSPADM

## 2025-06-06 RX ORDER — PROPOFOL 10 MG/ML
INJECTION, EMULSION INTRAVENOUS PRN
Status: DISCONTINUED | OUTPATIENT
Start: 2025-06-06 | End: 2025-06-06

## 2025-06-06 RX ORDER — FENTANYL CITRATE 50 UG/ML
25 INJECTION, SOLUTION INTRAMUSCULAR; INTRAVENOUS EVERY 5 MIN PRN
Status: DISCONTINUED | OUTPATIENT
Start: 2025-06-06 | End: 2025-06-07 | Stop reason: HOSPADM

## 2025-06-06 RX ORDER — HYDROMORPHONE HYDROCHLORIDE 1 MG/ML
0.4 INJECTION, SOLUTION INTRAMUSCULAR; INTRAVENOUS; SUBCUTANEOUS EVERY 5 MIN PRN
Status: DISCONTINUED | OUTPATIENT
Start: 2025-06-06 | End: 2025-06-07 | Stop reason: HOSPADM

## 2025-06-06 RX ORDER — LABETALOL 20 MG/4 ML (5 MG/ML) INTRAVENOUS SYRINGE
PRN
Status: DISCONTINUED | OUTPATIENT
Start: 2025-06-06 | End: 2025-06-06

## 2025-06-06 RX ORDER — CEPHALEXIN 500 MG/1
500 CAPSULE ORAL 2 TIMES DAILY
Qty: 12 CAPSULE | Refills: 0 | Status: SHIPPED | OUTPATIENT
Start: 2025-06-06 | End: 2025-06-12

## 2025-06-06 RX ORDER — ESMOLOL HYDROCHLORIDE 10 MG/ML
INJECTION INTRAVENOUS PRN
Status: DISCONTINUED | OUTPATIENT
Start: 2025-06-06 | End: 2025-06-06

## 2025-06-06 RX ORDER — ACETAMINOPHEN 325 MG/1
975 TABLET ORAL ONCE
Status: COMPLETED | OUTPATIENT
Start: 2025-06-06 | End: 2025-06-06

## 2025-06-06 RX ORDER — LIDOCAINE 40 MG/G
CREAM TOPICAL
Status: DISCONTINUED | OUTPATIENT
Start: 2025-06-06 | End: 2025-06-06 | Stop reason: HOSPADM

## 2025-06-06 RX ORDER — LABETALOL HYDROCHLORIDE 5 MG/ML
10 INJECTION, SOLUTION INTRAVENOUS
Status: DISCONTINUED | OUTPATIENT
Start: 2025-06-06 | End: 2025-06-07 | Stop reason: HOSPADM

## 2025-06-06 RX ORDER — DEXAMETHASONE SODIUM PHOSPHATE 10 MG/ML
10 INJECTION, SOLUTION INTRAMUSCULAR; INTRAVENOUS ONCE
Status: COMPLETED | OUTPATIENT
Start: 2025-06-06 | End: 2025-06-06

## 2025-06-06 RX ORDER — FENTANYL CITRATE 50 UG/ML
50 INJECTION, SOLUTION INTRAMUSCULAR; INTRAVENOUS EVERY 5 MIN PRN
Status: DISCONTINUED | OUTPATIENT
Start: 2025-06-06 | End: 2025-06-07 | Stop reason: HOSPADM

## 2025-06-06 RX ORDER — EPINEPHRINE 1 MG/ML
INJECTION INTRAMUSCULAR; INTRAVENOUS; SUBCUTANEOUS PRN
Status: DISCONTINUED | OUTPATIENT
Start: 2025-06-06 | End: 2025-06-06 | Stop reason: HOSPADM

## 2025-06-06 RX ORDER — FENTANYL CITRATE 50 UG/ML
INJECTION, SOLUTION INTRAMUSCULAR; INTRAVENOUS PRN
Status: DISCONTINUED | OUTPATIENT
Start: 2025-06-06 | End: 2025-06-06

## 2025-06-06 RX ADMIN — PROPOFOL 200 MG: 10 INJECTION, EMULSION INTRAVENOUS at 12:17

## 2025-06-06 RX ADMIN — FENTANYL CITRATE 25 MCG: 50 INJECTION, SOLUTION INTRAMUSCULAR; INTRAVENOUS at 14:09

## 2025-06-06 RX ADMIN — OXYCODONE HYDROCHLORIDE 5 MG: 5 TABLET ORAL at 14:26

## 2025-06-06 RX ADMIN — LIDOCAINE HYDROCHLORIDE 70 MG: 20 INJECTION, SOLUTION INFILTRATION; PERINEURAL at 12:17

## 2025-06-06 RX ADMIN — ESMOLOL HYDROCHLORIDE 10 MG: 10 INJECTION INTRAVENOUS at 13:06

## 2025-06-06 RX ADMIN — Medication 0.5 MG: at 12:48

## 2025-06-06 RX ADMIN — Medication 100 MCG: at 12:38

## 2025-06-06 RX ADMIN — DEXAMETHASONE SODIUM PHOSPHATE 10 MG: 10 INJECTION, SOLUTION INTRAMUSCULAR; INTRAVENOUS at 12:17

## 2025-06-06 RX ADMIN — ACETAMINOPHEN 975 MG: 325 TABLET ORAL at 10:38

## 2025-06-06 RX ADMIN — Medication 50 MG: at 12:17

## 2025-06-06 RX ADMIN — FENTANYL CITRATE 100 MCG: 50 INJECTION, SOLUTION INTRAMUSCULAR; INTRAVENOUS at 12:17

## 2025-06-06 RX ADMIN — ESMOLOL HYDROCHLORIDE 10 MG: 10 INJECTION INTRAVENOUS at 13:27

## 2025-06-06 RX ADMIN — ONDANSETRON 4 MG: 2 INJECTION INTRAMUSCULAR; INTRAVENOUS at 15:08

## 2025-06-06 RX ADMIN — SODIUM CHLORIDE, SODIUM LACTATE, POTASSIUM CHLORIDE, CALCIUM CHLORIDE: 600; 310; 30; 20 INJECTION, SOLUTION INTRAVENOUS at 10:49

## 2025-06-06 RX ADMIN — GLYCOPYRROLATE 0.2 MG: 0.2 INJECTION, SOLUTION INTRAMUSCULAR; INTRAVENOUS at 12:17

## 2025-06-06 RX ADMIN — FENTANYL CITRATE 25 MCG: 50 INJECTION, SOLUTION INTRAMUSCULAR; INTRAVENOUS at 14:15

## 2025-06-06 RX ADMIN — LABETALOL 20 MG/4 ML (5 MG/ML) INTRAVENOUS SYRINGE 5 MG: at 13:35

## 2025-06-06 RX ADMIN — LABETALOL 20 MG/4 ML (5 MG/ML) INTRAVENOUS SYRINGE 5 MG: at 13:30

## 2025-06-06 RX ADMIN — PROPOFOL 150 MCG/KG/MIN: 10 INJECTION, EMULSION INTRAVENOUS at 12:20

## 2025-06-06 RX ADMIN — ONDANSETRON 4 MG: 2 INJECTION INTRAMUSCULAR; INTRAVENOUS at 12:17

## 2025-06-06 RX ADMIN — ESMOLOL HYDROCHLORIDE 20 MG: 10 INJECTION INTRAVENOUS at 13:02

## 2025-06-06 NOTE — DISCHARGE INSTRUCTIONS
[facility]  Home Care Following Anesthesia  For 24 hours after surgery:  Get plenty of rest.  A responsible adult must stay with you for at least 24 hours after you leave the surgery center.  Do not drive or use heavy equipment.  If you have weakness or tingling, don't drive or use heavy equipment until this feeling goes away.   Do not drink alcohol.   Avoid strenuous or risky activities.  Ask for help when climbing stairs.  You may feel lightheaded.  IF so, sit for a few minutes before standing.  Have someone help you get up.   If you have nausea (feel sick to your stomach): Drink only clear liquids such as apple juice, ginger ale, broth or 7-Up.  Rest may also help.  Be sure to drink enough fluids.  Move to a regular diet as you feel able.   You may have a slight fever.  Call the doctor if your fever is over 100 F (37.7 C) (taken under the tongue) or lasts longer than 24 hours.  You may have a dry mouth, a sore throat, muscle aches or trouble sleeping. These should go away after 24 hours.  Do not make important or legal decisions.   It is recommended to avoid smoking.               Tips for taking pain medications  To get the best pain relief possible, remember these points:  Take pain medications as directed, before pain becomes severe.  Pain medication can upset your stomach: taking it with food may help.  Constipation is a common side effect of pain medication. Drink plenty of  fluids.  Eat foods high in fiber. Take a stool softener if recommended by your doctor or pharmacist.  Do not drink alcohol, drive or operate machinery while taking pain medications.  Ask about other ways to control pain, such as with heat, ice or relaxation.    Tylenol/Acetaminophen Consumption    If you feel your pain relief is insufficient, you may take Tylenol/Acetaminophen in addition to your narcotic pain medication.   Be careful not to exceed 4,000 mg of Tylenol/Acetaminophen in a 24 hour  period from all sources.  If you are taking extra strength Tylenol/acetaminophen (500 mg), the maximum dose is 8 tablets in 24 hours.  If you are taking regular strength acetaminophen (325 mg), the maximum dose is 12 tablets in 24 hours.  975 mg of Tylenol given at 11:00 am next dose may be given after 5:00 pm    Call a doctor for any of the following:  Signs of infection (fever, growing tenderness at the surgery site, a large amount of drainage or bleeding, severe pain, foul-smelling drainage, redness, swelling).  It has been over 8 to 10 hours since surgery and you are still not able to urinate (pass water).  Headache for over 24 hours.  Signs of Covid-19 infection (temperature over 100 degrees, shortness of breath, cough, loss of taste/smell, generalized body aches, persistent headache, chills, sore throat, nausea/vomiting/diarrhea)    Your doctor is:  Dr. Jenn Jacobo, Otolaryngology: [phone]                After hours and weekends call the hospital @ [phone] and ask for the resident on call for:  ENT Otolaryngology  For emergency care, call the:  [place] [facility]:  [phone] (TTY for hearing impaired: [phone])

## 2025-06-06 NOTE — TELEPHONE ENCOUNTER
LVM asking the patient to call the clinic back    Appt options: 07/21 at 3:00pm, 07/28 at 2:00pm or 3:00pm    Brooks BASS CMA - Dermatology

## 2025-06-06 NOTE — ANESTHESIA PREPROCEDURE EVALUATION
Anesthesia Pre-Procedure Evaluation    Patient: Shannan Harris   MRN: [id number] : 1970          Procedure : Procedure(s):  SEPTOPLASTY, NOSE, WITH TURBINOPLASTY and left nasal valve reconstruction with autologous batten graft         Past Medical History:   Diagnosis Date    Elevated liver enzymes 2025    Gastroesophageal reflux disease     Gastroesophageal reflux disease with esophagitis 2025    History of basal cell carcinoma     Hot flashes     Localized osteoporosis without current pathological fracture 2025    Nondisplaced fracture of fifth left metatarsal bone with routine healing 2016    Osteopenia of multiple sites 2025    Pure hypercholesterolemia 2022      Past Surgical History:   Procedure Laterality Date    BIOPSY OF SKIN LESION      HYSTERECTOMY      for uterine myoma and painful periods; has ovaries      No Known Allergies   Social History     Tobacco Use    Smoking status: Never    Smokeless tobacco: Never   Substance Use Topics    Alcohol use: Yes     Comment: a few drinks on the weekend      Wt Readings from Last 1 Encounters:   25 61.7 kg (136 lb)        Anesthesia Evaluation   Pt has had prior anesthetic.     No history of anesthetic complications       ROS/MED HX  ENT/Pulmonary: Comment: Chronic nasal obstruction/congestion      Neurologic:  - neg neurologic ROS     Cardiovascular:     (+) Dyslipidemia - -   -  - -                                      METS/Exercise Tolerance: >4 METS    Hematologic:  - neg hematologic  ROS     Musculoskeletal:  - neg musculoskeletal ROS     GI/Hepatic:     (+) GERD, Asymptomatic on medication,           liver disease,       Renal/Genitourinary:  - neg Renal ROS     Endo:  - neg endo ROS     Psychiatric/Substance Use:  - neg psychiatric ROS     Infectious Disease:       Malignancy:       Other:              Physical Exam  Airway  Mallampati: II  TM distance: >3 FB  Neck ROM: full  Upper bite lip test:  "II  Mouth opening: >= 4 cm    Cardiovascular - normal exam  Rhythm: regular  Rate: normal rate     Dental   (+) Minor Abnormalities - some fillings, tiny chips      Pulmonary - normal examBreath sounds clear to auscultation        Neurological - normal exam  She appears awake, alert and oriented x3.    Other Findings       OUTSIDE LABS:  CBC:   Lab Results   Component Value Date    WBC 4.3 04/01/2025    WBC 6.8 12/17/2019    HGB 13.6 04/01/2025    HGB 13.8 12/17/2019    HCT 39.8 04/01/2025    HCT 41.1 12/17/2019     04/01/2025     12/17/2019     BMP:   Lab Results   Component Value Date     12/17/2019     06/06/2017    POTASSIUM 4.2 12/17/2019    POTASSIUM 4.2 06/06/2017    CHLORIDE 105 12/17/2019    CHLORIDE 105 06/06/2017    CO2 27 12/17/2019    CO2 28 06/06/2017    BUN 13 12/17/2019    BUN 12 06/06/2017    CR 0.57 12/17/2019    CR 0.64 06/06/2017    GLC 96 06/29/2021    GLC 86 12/17/2019     COAGS:   Lab Results   Component Value Date    INR 0.9 04/08/2025     POC: No results found for: \"BGM\", \"HCG\", \"HCGS\"  HEPATIC:   Lab Results   Component Value Date    ALBUMIN 4.7 04/01/2025    PROTTOTAL 7.2 04/01/2025    ALT 78 (H) 04/01/2025    AST 56 (H) 04/01/2025    ALKPHOS 198 (H) 04/01/2025    BILITOTAL 0.4 04/01/2025     OTHER:   Lab Results   Component Value Date    A1C 5.5 03/12/2025    FRANKO 9.0 12/17/2019    TSH 2.15 03/12/2025       Anesthesia Plan    ASA Status:  1      NPO Status: NPO Appropriate   Anesthesia Type: General.  Airway: oral.  Induction: intravenous.  Maintenance: TIVA.   Techniques and Equipment:     - Airway:  Planned airway equipment includes oral PAM tube.     - Monitoring Plan: standard ASA monitoring, train of four monitoring     Consents    Anesthesia Plan(s) and associated risks, benefits, and realistic alternatives discussed. Questions answered and patient/representative(s) expressed understanding.     - Discussed: anesthesiologist     - Discussed with:  Patient   "      - Pt is DNR/DNI Status: no DNR     Blood Consent:      - Discussed with: not discussed.     Postoperative Care    Pain management: multimodal analgesia.     Comments:                   Lisa Angulo MD    I have reviewed the pertinent notes and labs in the chart from the past 30 days and (re)examined the patient.  Any updates or changes from those notes are reflected in this note.    Clinically Significant Risk Factors Present on Admission

## 2025-06-06 NOTE — ANESTHESIA PROCEDURE NOTES
Airway       Patient location during procedure: OR       Procedure Start/Stop Times: 6/6/2025 12:19 PM  Staff -        CRNA: See Mckenzie APRN CRNA       Performed By: CRNAIndications and Patient Condition       Indications for airway management: diaz-procedural       Induction type:intravenous       Mask difficulty assessment: 1 - vent by mask    Final Airway Details       Final airway type: endotracheal airway       Successful airway: ETT - single and Oral  Endotracheal Airway Details        ETT size (mm): 7.0       Cuffed: yes       Successful intubation technique: direct laryngoscopy       DL Blade Type: Ambrocio 2       Grade View of Cords: 1       Position: Center       Measured from: gums/teeth       Secured at (cm): 20       Bite block used: None    Post intubation assessment        Placement verified by: capnometry, equal breath sounds and chest rise        Number of attempts at approach: 1       Number of other approaches attempted: 0       Secured with: tape       Ease of procedure: easy       Dentition: Intact and Unchanged    Medication(s) Administered   Medication Administration Time: 6/6/2025 12:19 PM         Patient to room 214 from ER. Ambulated from gurney to bed, gait is steady and no assistance needed. Patient with no c/o SOB or pain. Oriented to room. Call bell in reach and safety measures in place.

## 2025-06-06 NOTE — OP NOTE
OTOLARYNGOLOGY OPERATIVE NOTE    SURGEON: Dionte Quevedo MD     ASSISTANT: none     PREOPERATIVE DIAGNOSES:   1. Nasal obstruction  2. Deviated septum  3. Inferior turbinate hypertrophy  4.  Left nasal valve collapse    POSTOPERATIVE DIAGNOSES:   Same    PROCEDURE:   1. Septoplasty  2. Submucosal resection of turbinates  3.  Left nasal valve repair with autologous cartilage batten graft.    INDICATIONS: As above.     SURGICAL FINDINGS:   Significant deformity left internal nasal valve, deviated nasal septum to the right, large inferior turbinates    BRIEF HISTORY: Patient has a history of deviated septum, large inferior turbinates and likely posttraumatic left nasal valve deformity and collapse. The patient understands the risks and benefits of surgery, limitations, complications including but not limited to bleeding, infection, nasal septum perforation, recurrence of symptoms, need for additional procedures, need for repeat procedures, chronic epistaxis, cosmetic issue due to batten graft implant, risks related to anesthesia amongst others. Wishes surgery and now fully agrees to it.     DESCRIPTION OF PROCEDURE: The patient is taken to the operating room, placed under general endotracheal anesthetic, appropriately positioned, prepped and draped. We examined the nose, saw that indeed she is quite deflected to the right side. We started a septoplasty on the left side in an anterior hemitransfixion position, carrying down the incision to the floor of the nose. We identified the anterior aspect of the quadrangular cartilage and with the sean elevator, carefully cleared up the mucoperichondrium. Once we started elevating the flap, we switched to a [place] elevator. Further developed the flap superiorly, posteriorly, inferiorly towards the floor of the nose. We identified bony cartilaginous junction. About 1/3 of the way in, we bisected the cartilage in a vertical fashion and in a swing-door type of opening, started  elevation of the mucoperichondrium on both sides. We used a swivel knife to remove some of the posterior quadrangular cartilage. We now defined the upper plate of the ethmoid and had some spurs as well as some spurs in the vomer and maxillary crest. We used 4 mm osteotome to remove some of the spurs off the maxillary crest and the vomer. We elevated the mucoperiosteum with Li elevator. We also removed some of the spurs including the perpendicular plate of the ethmoid with a double-action rongeur. We then morcellized some of the posterior quadrangular cartilage for posterior support and stability. We brought the flap back into anatomic position, closed it anteriorly with interrupted 4-0 chromic sutures. At this point, we also addressed the inferior turbinates.  We injected the inferior turbinates with 1% lidocaine with epinephrine at 1:100,000.  Using a 2.0mm Hakeem Coblator tip, 3 insertion points were created and the microdebrider was used to remove submucosal tissue and bone in each of the inferior turbinates.  Bipolar cautery was used to control hemostasis.     At this point left internal nasal valve deformity was addressed.  Previously removed quadrangular cartilage was used to create small thin batten graft.  Incision was made in the intercartilaginous region.  The area of defect is identified.  Precision pocket is made with tenotomy scissors.  Batten graft is positioned into the pocket.  Pocket is then closed with 5-0 PDS suture.    We  used [place]ga splints to stabilize the septum and secure it with a 3-0 nylon suture. The patient tolerated the procedure well with about 30 mL blood loss. Extubated in the OR, taken to recovery in stable condition.   ELAN WYATT MD

## 2025-06-06 NOTE — ANESTHESIA CARE TRANSFER NOTE
Patient: Shannan Harris    Procedure: Procedure(s):  SEPTOPLASTY, NOSE, WITH TURBINOPLASTY and left nasal valve reconstruction with autologous batten graft       Diagnosis: Nasal obstruction [J34.89]  Deviated nasal septum [J34.2]  Hypertrophy of both inferior nasal turbinates [J34.3]  Nasal valve collapse [J34.829]  Diagnosis Additional Information: No value filed.    Anesthesia Type:   General     Note:    Oropharynx: oropharynx clear of all foreign objects and spontaneously breathing  Level of Consciousness: drowsy  Oxygen Supplementation: room air    Independent Airway: airway patency satisfactory and stable  Dentition: dentition unchanged  Vital Signs Stable: post-procedure vital signs reviewed and stable  Report to RN Given: handoff report given  Patient transferred to: PACU    Handoff Report: Identifed the Patient, Identified the Reponsible Provider, Reviewed the pertinent medical history, Discussed the surgical course, Reviewed Intra-OP anesthesia mangement and issues during anesthesia, Set expectations for post-procedure period and Allowed opportunity for questions and acknowledgement of understanding      Vitals:  Vitals Value Taken Time   /82 06/06/25 14:02   Temp     Pulse 82 06/06/25 14:08   Resp 9 06/06/25 14:08   SpO2 94 % 06/06/25 14:08   Vitals shown include unfiled device data.    Electronically Signed By: AMANDA Sims CRNA  June 6, 2025  2:09 PM

## 2025-06-10 NOTE — ANESTHESIA POSTPROCEDURE EVALUATION
Patient: Shannan Harris    Procedure: Procedure(s):  SEPTOPLASTY, NOSE, WITH TURBINOPLASTY and left nasal valve reconstruction with autologous batten graft       Anesthesia Type:  General    Note:  Disposition: Outpatient   Postop Pain Control: Uneventful            Sign Out: Well controlled pain   PONV: No   Neuro/Psych: Uneventful            Sign Out: Acceptable/Baseline neuro status   Airway/Respiratory: Uneventful            Sign Out: Acceptable/Baseline resp. status   CV/Hemodynamics: Uneventful            Sign Out: Acceptable CV status; No obvious hypovolemia; No obvious fluid overload   Other NRE: NONE   DID A NON-ROUTINE EVENT OCCUR? No           Last vitals:  Vitals Value Taken Time   /72 06/06/25 14:30   Temp 36.7  C (98.1  F) 06/06/25 14:30   Pulse 75 06/06/25 14:30   Resp 12 06/06/25 14:30   SpO2 96 % 06/06/25 14:30       Electronically Signed By: Hollis Yusuf MD  Tala 10, 2025  9:47 AM

## 2025-06-11 NOTE — TELEPHONE ENCOUNTER
FUTURE VISIT INFORMATION      FUTURE VISIT INFORMATION:  Date: 9/9/25  Time: 10:30  [place] [facility] [place]FORMATION:  Referring provider:  Nanette Blanton MD   Referring providers clinic:  [facility]   Reason for visit/diagnosis  N64.89 (ICD-10-CM) - Bilateral pendulous breasts     RECORDS REQUESTED FROM:       Clinic name Comments Records Status Imaging Status    OV-4/28/25-Dr. Blanton Internal     MA Screening-4/10/25  Internal

## 2025-06-12 ENCOUNTER — OFFICE VISIT (OUTPATIENT)
Dept: OTOLARYNGOLOGY | Facility: CLINIC | Age: 55
End: 2025-06-12
Payer: COMMERCIAL

## 2025-06-12 VITALS — OXYGEN SATURATION: 100 % | HEART RATE: 77 BPM | SYSTOLIC BLOOD PRESSURE: 111 MMHG | DIASTOLIC BLOOD PRESSURE: 76 MMHG

## 2025-06-12 DIAGNOSIS — Z98.890 STATUS POST NASAL SEPTOPLASTY: Primary | ICD-10-CM

## 2025-06-12 NOTE — LETTER
6/12/2025      Shannan Harris  [address]  [facility] [place] [address]      Dear Colleague,    Thank you for referring your patient, Shannan Harris, to the [facility]. Please see a copy of my visit note below.    History of Present Illness - Shannan Harris is a 55 year old female presenting in clinic today for a recheck on Patient presents with:  Surgical Followup: Septoplasty 06/06/25  Patient status post septoplasty, submucous resection of turbinates and left nasal valve repair for nasal valve collapse using autologous batten graft.  Overall quite feeling plugged with some discomfort.        BP Readings from Last 1 Encounters:   04/28/25 106/72       BP noted to be well controlled today in office.     Shannan IS NOT a smoker/uses chewing tobacco.        Past Medical History -   Past Medical History:   Diagnosis Date     Gastroesophageal reflux disease      History of basal cell carcinoma      Hot flashes      Nondisplaced fracture of fifth left metatarsal bone with routine healing 09/12/2016     Pure hypercholesterolemia 12/2/2022       Current Medications -   Current Outpatient Medications:      econazole nitrate 1 % external cream, Apply topically daily. (Patient not taking: Reported on 5/6/2025), Disp: 85 g, Rfl: 4     estradiol (VIVELLE-DOT) 0.025 MG/24HR bi-weekly patch, APPLY 1 PATCH TOPICALLY TO THE SKIN 2 TIMES A WEEK, Disp: 24 patch, Rfl: 3     famotidine (PEPCID) 40 MG tablet, Take 1 tablet (40 mg) by mouth at bedtime., Disp: 90 tablet, Rfl: 1     imiquimod (ALDARA) 5 % external cream, Apply once daily 5 days per week, for 6 weeks total to area on right posterior shoulder (Patient not taking: Reported on 5/6/2025), Disp: 24 packet, Rfl: 2     niacinamide 500 MG tablet, Take 1 tablet (500 mg) by mouth 2 times daily (with meals). One pill twice daily, Disp: 90 tablet, Rfl: 6     omeprazole (PRILOSEC) 20 MG DR capsule, Take 1 capsule (20 mg) by mouth daily as needed (heartburn).,  Disp: 90 capsule, Rfl: 1     UNABLE TO FIND, daily. MEDICATION NAME: CDP choline 1000 mg (Patient not taking: Reported on 5/6/2025), Disp: , Rfl:     Current Facility-Administered Medications:      lidocaine 1% with EPINEPHrine 1:100,000 injection 3 mL, 3 mL, Intradermal, Once, Cuate Ambrocio MD    Allergies - No Known Allergies    Social History -   Social History     Socioeconomic History     Marital status:      Number of children: 0   Occupational History     Occupation: research     Employer: [employer]   Tobacco Use     Smoking status: Never     Smokeless tobacco: Never   Vaping Use     Vaping status: Never Used   Substance and Sexual Activity     Alcohol use: Yes     Comment: a few drinks on the weekend     Drug use: No     Sexual activity: Yes     Partners: Male     Birth control/protection: Surgical     Comment: [facility]   Social History Narrative    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    One son, born by surrogacy in [place] in 2014.    Getting  in June 2017.       Family History -   Family History   Problem Relation Age of Onset     Skin Cancer Mother         BCC     Hypertension Mother      Hyperlipidemia Mother      Gout Mother      Heart Disease Father      Skin Cancer Maternal Grandmother      Skin Cancer Brother      Cancer Brother         BCC     Brain Cancer Paternal Uncle      Pancreatic Cancer Paternal Uncle      Skin Cancer Maternal Cousin      Cancer - colorectal No family hx of      Breast Cancer No family hx of      Melanoma No family hx of        Review of Systems - As per HPI and PMHx, otherwise review of system review of the head and neck negative. Otherwise 10+ review of system is negative    Physical Exam  There were no vitals taken for this visit.  BMI: There is no height or weight on file to calculate BMI.    General - The patient is well nourished and well developed, and appears to have good nutritional status.  Alert and oriented to person and place, answers  questions and cooperates with examination appropriately.    SKIN - No suspicious lesions or rashes.  Respiration - No respiratory distress.  Head and Face - Normocephalic and atraumatic, with no gross asymmetry noted of the contour of the facial features.  The facial nerve is intact, with strong symmetric movements.    Voice and Breathing - The patient was breathing comfortably without the use of accessory muscles. The patients voice was clear and strong, and had appropriate pitch and quality.    Ears - Bilateral pinna and EACs with normal appearing overlying skin. Tympanic membrane intact with good mobility on pneumatic otoscopy bilaterally. Bony landmarks of the ossicular chain are normal. The tympanic membranes are normal in appearance. No retraction, perforation, or masses.  No fluid or purulence was seen in the external canal or the middle ear.     Eyes - Extraocular movements intact.  Sclera were not icteric or injected, conjunctiva were pink and moist.      Nose - External contour is asymmetric, with still some prominence of the batten graft on the left side but the disappearance of medial defect that the patient had previously involving portion of her upper lateral cartilage as well as the lateral aspect of the body of the lower lateral cartilage.  Nasal mucosa is pink and moist with no abnormal mucus.  The septum was midline and non-obstructive, turbinates of normal size and position.  No polyps, masses, or purulence noted on examination.    Neuro - Nonfocal neuro exam is normal, CN 2 through 12 intact, normal gait and muscle tone.      Performed in clinic today:  Nasal septal splints removed without difficulty today.    A/P - Shannan Harris is a 55 year old female Patient presents with:  Surgical Followup: Septoplasty 06/06/25    Patient had nasal septal splints removed today.  Will instruct her on properly massaging around the cartilage that was implanted to see if it feels in the space in the most  symmetrical fashion.  If patient is still feeling a prominence of the cartilage will start some triamcinolone injections in the month.  Will try to preserve the cartilage in order to maintain left internal nasal valve patency    Shannan should follow up in 1 month.            Dionte Quevedo MD          Again, thank you for allowing me to participate in the care of your patient.        Sincerely,        Dionte Quevedo MD, MD    Electronically signed

## 2025-06-16 PROBLEM — Z85.828 HISTORY OF BASAL CELL CANCER: Status: ACTIVE | Noted: 2025-06-16

## 2025-07-10 NOTE — PROGRESS NOTES
"History of Present Illness - Shannan Harris is a 55 year old female presenting in clinic today for a recheck on Patient presents with:  Follow Up    Patient previously had septoplasty submucous resection of turbinates as well as a left nasal valve reconstruction with autologous batten graft.  Presents in follow-up.  Overall is breathing better.  Even though left side still feels a little \"tighter\" than the right.  Cartilage is settling even though still there is prominence laterally.        BP Readings from Last 1 Encounters:   07/14/25 116/72       BP noted to be well controlled today in office.     Shannan IS NOT a smoker/uses chewing tobacco.      Past Medical History -   Past Medical History:   Diagnosis Date    Elevated liver enzymes 05/29/2025    Gastroesophageal reflux disease     Gastroesophageal reflux disease with esophagitis 05/29/2025    History of basal cell carcinoma     Hot flashes     Localized osteoporosis without current pathological fracture 05/01/2025    Nondisplaced fracture of fifth left metatarsal bone with routine healing 09/12/2016    Osteopenia of multiple sites 05/29/2025    Pure hypercholesterolemia 12/02/2022       Current Medications -   Current Outpatient Medications:     estradiol (VIVELLE-DOT) 0.025 MG/24HR bi-weekly patch, APPLY 1 PATCH TOPICALLY TO THE SKIN 2 TIMES A WEEK, Disp: 24 patch, Rfl: 3    famotidine (PEPCID) 40 MG tablet, Take 1 tablet (40 mg) by mouth at bedtime., Disp: 90 tablet, Rfl: 1    niacinamide 500 MG tablet, Take 1 tablet (500 mg) by mouth 2 times daily (with meals). One pill twice daily, Disp: 90 tablet, Rfl: 6    omeprazole (PRILOSEC) 20 MG DR capsule, Take 1 capsule (20 mg) by mouth daily as needed (heartburn)., Disp: 90 capsule, Rfl: 1    Allergies - No Known Allergies    Social History -   Social History     Socioeconomic History    Marital status:     Number of children: 0   Occupational History    Occupation: research     Employer: [employer]" [place]   Tobacco Use    Smoking status: Never    Smokeless tobacco: Never   Vaping Use    Vaping status: Never Used   Substance and Sexual Activity    Alcohol use: Yes     Comment: a few drinks on the weekend    Drug use: No    Sexual activity: Yes     Partners: Male     Birth control/protection: Surgical     Comment: kelsy   Social History Narrative    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    One son, born by surrogacy in [place] in 2014.    Getting  in June 2017.     Social Drivers of Health     Interpersonal Safety: Low Risk  (6/6/2025)    Interpersonal Safety     Do you feel physically and emotionally safe where you currently live?: Yes     Within the past 12 months, have you been hit, slapped, kicked or otherwise physically hurt by someone?: No     Within the past 12 months, have you been humiliated or emotionally abused in other ways by your partner or ex-partner?: No       Family History -   Family History   Problem Relation Age of Onset    Skin Cancer Mother         BCC    Hypertension Mother     Hyperlipidemia Mother     Gout Mother     Heart Disease Father     Skin Cancer Maternal Grandmother     Skin Cancer Brother     Cancer Brother         BCC    Brain Cancer Paternal Uncle     Pancreatic Cancer Paternal Uncle     Skin Cancer Maternal Cousin     Cancer - colorectal No family hx of     Breast Cancer No family hx of     Melanoma No family hx of        Review of Systems - As per HPI and PMHx, otherwise review of system review of the head and neck negative. Otherwise 10+ review of system is negative    Physical Exam  /72   Temp 98.7  F (37.1  C) (Temporal)   Ht 1.594 m (5' 2.75\")   Wt 63 kg (139 lb)   BMI 24.82 kg/m    BMI: Body mass index is 24.82 kg/m .    General - The patient is well nourished and well developed, and appears to have good nutritional status.  Alert and oriented to person and place, answers questions and cooperates with examination appropriately.    SKIN - No suspicious " lesions or rashes.  Respiration - No respiratory distress.  Head and Face - Normocephalic and atraumatic, with no gross asymmetry noted of the contour of the facial features.  The facial nerve is intact, with strong symmetric movements.    Voice and Breathing - The patient was breathing comfortably without the use of accessory muscles. The patients voice was clear and strong, and had appropriate pitch and quality.      Eyes - Extraocular movements intact.  Sclera were not icteric or injected, conjunctiva were pink and moist.    Nose - External contour is symmetric, no gross deflection or scars.  Nasal mucosa is pink and moist with no abnormal mucus.  The septum was midline and non-obstructive, turbinates of normal size and position.  No polyps, masses, or purulence noted on examination.  Septum does appear to be quite midline.  In regard to her prominence laterally of the cartilage we discussed different options.  At this point we will do a couple of injections with triamcinolone locally to soften it the little bit promote more natural scarring.    Neuro - Nonfocal neuro exam is normal, CN 2 through 12 intact, normal gait and muscle tone.      Performed in clinic today:  After topical anesthetic with the lidocaine intranasally I used the Kenalog 40 mg/mL inject half mL intranasally into the intercartilaginous space laterally where the prominence of the cartilage was slightly bothersome.  Patient tolerated procedure well.      A/P - Shannan Harris is a 55 year old female Patient presents with:  Follow Up    Patient status post septoplasty submucous resection of turbinates and nasal valve reconstruction on the left with cartilage graft.  Right now an injection was made into the area to soften some of the potential scar tissue.  If patient continues to have cosmetically displeasing cartilaginous appearance may refer her to Dr. Gomes facial plastic reconstructive surgeon to possibly remove the lateral aspect of the  batten graft.  Hopefully nicely formed fibrous bridge around that area would be adequate to hold the nasal valve area.    Shannan should follow up in 5 weeks.            Dionte Quevedo MD

## 2025-07-14 ENCOUNTER — OFFICE VISIT (OUTPATIENT)
Dept: OTOLARYNGOLOGY | Facility: CLINIC | Age: 55
End: 2025-07-14
Payer: COMMERCIAL

## 2025-07-14 VITALS
WEIGHT: 139 LBS | BODY MASS INDEX: 24.63 KG/M2 | HEIGHT: 63 IN | TEMPERATURE: 98.7 F | DIASTOLIC BLOOD PRESSURE: 72 MMHG | SYSTOLIC BLOOD PRESSURE: 116 MMHG

## 2025-07-14 DIAGNOSIS — L91.0 HYPERTROPHIC SCAR: Primary | ICD-10-CM

## 2025-07-14 PROCEDURE — 3074F SYST BP LT 130 MM HG: CPT | Performed by: OTOLARYNGOLOGY

## 2025-07-14 PROCEDURE — 1126F AMNT PAIN NOTED NONE PRSNT: CPT | Performed by: OTOLARYNGOLOGY

## 2025-07-14 PROCEDURE — 11900 INJECT SKIN LESIONS </W 7: CPT | Mod: 58 | Performed by: OTOLARYNGOLOGY

## 2025-07-14 PROCEDURE — 99024 POSTOP FOLLOW-UP VISIT: CPT | Performed by: OTOLARYNGOLOGY

## 2025-07-14 PROCEDURE — 3078F DIAST BP <80 MM HG: CPT | Performed by: OTOLARYNGOLOGY

## 2025-07-14 RX ORDER — TRIAMCINOLONE ACETONIDE 40 MG/ML
40 INJECTION, SUSPENSION INTRA-ARTICULAR; INTRAMUSCULAR ONCE
Status: COMPLETED | OUTPATIENT
Start: 2025-07-14 | End: 2025-07-14

## 2025-07-14 RX ADMIN — TRIAMCINOLONE ACETONIDE 40 MG: 40 INJECTION, SUSPENSION INTRA-ARTICULAR; INTRAMUSCULAR at 10:12

## 2025-07-14 ASSESSMENT — PAIN SCALES - GENERAL: PAINLEVEL_OUTOF10: NO PAIN (0)

## 2025-07-14 NOTE — LETTER
"7/14/2025      Shannan Harris  [address]  [facility] [address]      Dear Colleague,    Thank you for referring your patient, Shannan Harris, to the [facility]. Please see a copy of my visit note below.    History of Present Illness - Shannan Harris is a 55 year old female presenting in clinic today for a recheck on Patient presents with:  Follow Up    Patient previously had septoplasty submucous resection of turbinates as well as a left nasal valve reconstruction with autologous batten graft.  Presents in follow-up.  Overall is breathing better.  Even though left side still feels a little \"tighter\" than the right.  Cartilage is settling even though still there is prominence laterally.        BP Readings from Last 1 Encounters:   07/14/25 116/72       BP noted to be well controlled today in office.     Shannan IS NOT a smoker/uses chewing tobacco.      Past Medical History -   Past Medical History:   Diagnosis Date     Elevated liver enzymes 05/29/2025     Gastroesophageal reflux disease      Gastroesophageal reflux disease with esophagitis 05/29/2025     History of basal cell carcinoma      Hot flashes      Localized osteoporosis without current pathological fracture 05/01/2025     Nondisplaced fracture of fifth left metatarsal bone with routine healing 09/12/2016     Osteopenia of multiple sites 05/29/2025     Pure hypercholesterolemia 12/02/2022       Current Medications -   Current Outpatient Medications:      estradiol (VIVELLE-DOT) 0.025 MG/24HR bi-weekly patch, APPLY 1 PATCH TOPICALLY TO THE SKIN 2 TIMES A WEEK, Disp: 24 patch, Rfl: 3     famotidine (PEPCID) 40 MG tablet, Take 1 tablet (40 mg) by mouth at bedtime., Disp: 90 tablet, Rfl: 1     niacinamide 500 MG tablet, Take 1 tablet (500 mg) by mouth 2 times daily (with meals). One pill twice daily, Disp: 90 tablet, Rfl: 6     omeprazole (PRILOSEC) 20 MG DR capsule, Take 1 capsule (20 mg) by mouth daily as needed (heartburn)., " "Disp: 90 capsule, Rfl: 1    Allergies - No Known Allergies    Social History -   Social History     Socioeconomic History     Marital status:      Number of children: 0   Occupational History     Occupation: research     Employer: [employer]   Tobacco Use     Smoking status: Never     Smokeless tobacco: Never   Vaping Use     Vaping status: Never Used   Substance and Sexual Activity     Alcohol use: Yes     Comment: a few drinks on the weekend     Drug use: No     Sexual activity: Yes     Partners: Male     Birth control/protection: Surgical     Comment: hyst   Social History Narrative    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    One son, born by surrogacy in [place] in 2014.    Getting  in June 2017.     Social Drivers of Health     Interpersonal Safety: Low Risk  (6/6/2025)    Interpersonal Safety      Do you feel physically and emotionally safe where you currently live?: Yes      Within the past 12 months, have you been hit, slapped, kicked or otherwise physically hurt by someone?: No      Within the past 12 months, have you been humiliated or emotionally abused in other ways by your partner or ex-partner?: No       Family History -   Family History   Problem Relation Age of Onset     Skin Cancer Mother         BCC     Hypertension Mother      Hyperlipidemia Mother      Gout Mother      Heart Disease Father      Skin Cancer Maternal Grandmother      Skin Cancer Brother      Cancer Brother         BCC     Brain Cancer Paternal Uncle      Pancreatic Cancer Paternal Uncle      Skin Cancer Maternal Cousin      Cancer - colorectal No family hx of      Breast Cancer No family hx of      Melanoma No family hx of        Review of Systems - As per HPI and PMHx, otherwise review of system review of the head and neck negative. Otherwise 10+ review of system is negative    Physical Exam  /72   Temp 98.7  F (37.1  C) (Temporal)   Ht 1.594 m (5' 2.75\")   Wt 63 kg (139 lb)   BMI 24.82 kg/m    BMI: " Body mass index is 24.82 kg/m .    General - The patient is well nourished and well developed, and appears to have good nutritional status.  Alert and oriented to person and place, answers questions and cooperates with examination appropriately.    SKIN - No suspicious lesions or rashes.  Respiration - No respiratory distress.  Head and Face - Normocephalic and atraumatic, with no gross asymmetry noted of the contour of the facial features.  The facial nerve is intact, with strong symmetric movements.    Voice and Breathing - The patient was breathing comfortably without the use of accessory muscles. The patients voice was clear and strong, and had appropriate pitch and quality.      Eyes - Extraocular movements intact.  Sclera were not icteric or injected, conjunctiva were pink and moist.    Nose - External contour is symmetric, no gross deflection or scars.  Nasal mucosa is pink and moist with no abnormal mucus.  The septum was midline and non-obstructive, turbinates of normal size and position.  No polyps, masses, or purulence noted on examination.  Septum does appear to be quite midline.  In regard to her prominence laterally of the cartilage we discussed different options.  At this point we will do a couple of injections with triamcinolone locally to soften it the little bit promote more natural scarring.    Neuro - Nonfocal neuro exam is normal, CN 2 through 12 intact, normal gait and muscle tone.      Performed in clinic today:  After topical anesthetic with the lidocaine intranasally I used the Kenalog 40 mg/mL inject half mL intranasally into the intercartilaginous space laterally where the prominence of the cartilage was slightly bothersome.  Patient tolerated procedure well.      A/P - Shannanjason Harris is a 55 year old female Patient presents with:  Follow Up    Patient status post septoplasty submucous resection of turbinates and nasal valve reconstruction on the left with cartilage graft.  Right now an  injection was made into the area to soften some of the potential scar tissue.  If patient continues to have cosmetically displeasing cartilaginous appearance may refer her to Dr. Gomes facial plastic reconstructive surgeon to possibly remove the lateral aspect of the batten graft.  Hopefully nicely formed fibrous bridge around that area would be adequate to hold the nasal valve area.    Shannan should follow up in 5 weeks.            Dionte Quevedo MD         Again, thank you for allowing me to participate in the care of your patient.        Sincerely,        Dionte Quevedo MD, MD    Electronically signed

## 2025-07-21 ENCOUNTER — OFFICE VISIT (OUTPATIENT)
Dept: DERMATOLOGY | Facility: CLINIC | Age: 55
End: 2025-07-21
Payer: COMMERCIAL

## 2025-07-21 DIAGNOSIS — Z85.828 HISTORY OF BASAL CELL CANCER: ICD-10-CM

## 2025-07-21 PROCEDURE — 99207 PR NO CHARGE LOS: CPT | Performed by: DERMATOLOGY

## 2025-07-21 NOTE — LETTER
7/21/2025       RE: Shannan Harris  [address]  [facility] [address]     Dear Colleague,    Thank you for referring your patient, Shannan Harris, to the [facility] D[facility]MAT[facility] [place] [facility]. Please see a copy of my visit note below.    Laser- VBeam(Pulsed Dye Laser) Procedure Note: Medical      Dermatology Problem List:  1. Lesion to monitor  - left upper cutaneous lip, 1mm pearly papule -- biopsied 10/10/24, r/o NMSC  - R nasal tip, 3mm light brown macule with inferior erythema, c/f early BCC  2. Hx of NMSC, taking niacinamide  - early BCC (not biopsied), L calf, s/p Imiquimod 2/1/24  - sfBCC, R proximal anterior thigh s/p imiquimod 3/16/23  - sfBCC, L distal lateral thigh s/p imiquimod 3/16/23  - sfBCC, Right thigh s/p imiquimod 3/16/23  - superficial BCC, L ankle s/p bx 03/4/21 treated with Aldara   - superficial BCC, R anterior shin s/p bx 03/04/21 treated with Aldara  - BCC, R medial lower leg, s/p Mohs 1/27/20  - BCC, R medial calf, s/p Mohs 1/27/20  - BCC, L shin medial, s/p ED&C 11/21/19  - BCC, L shin lateral, s/p ED&C 11/21/19  - BCC, L upper chest status post ED&C  - BCC, abdomen  - BCC, R flank  - History: grew up in [place], no history of radiation, no known well-water history use, family with small numbers of BCCs not at the number she gets   3. Tinea pedis  - current tx: econazole cream  4. Hx dysplastic nevus  - mod, central upper back s/p exc 8/11/2016  5. AK, LN2      Procedure Date: Jul 21, 2025    Attending Staff Surgeon: Jose    Resident Surgeon: none    Assistant: none    Operating Room Data:     Surgery/Procedure Date:    SAME     Pre-operative Diagnosis:   Scar  Location: left upper cutaneous lip   Size(cm2): 3  Number of lesions: 1    Operation/Procedure    Vbeam pulsed dye laser treatment#: 1       Post-operative Diagnosis:  SAME    Laser Settings:  Energy:7 J/cm2  Spot size:10mm  Pulse width:  10 mS  (0.45 thru 40 mS)  Dynamic cooling spray settin mS  Dynamic cooling device delay:  20 mS      Fotofinder photos: No    Anesthesia:  None    Description of Operation/Procedure:   The nature and purpose of the procedure, associated risks, possible consequences, complications and alternative methods of treatment were explained in detail, this includes but is not limited to hyperpigmentation, hypopigmentation, scarring, bruising, hair loss pain/discomfort, eye injury,  and blister. We reviewed that the outcome could be any of the following: no improvement, slight improvement or change in skin color & texture, the skin might be permanently lighter or darker, and though uncommon, superficial scarring may occur.  Multiple treatments may be recommended.   A photo and operative consent were obtained. Time-out was performed.The patient was positioned to optimally expose the area treated. Protective eyewear was worn by the patient and goggles on all personnel in the treatment room. The patient confirmed the site to be treated. The laser energy output was verified by meter reading.      The clinically evident lesion(s) was/were treated with Krupa Vbeam pulsed dye laser (595 nm) beam as above. A total of 6 pulses were used. The patient tolerated the procedure well and no complications were noted. Post operative instructions were provided. The total laser operation and preparation time was 2 minutes.  The patient was counseled to call immediately for any issues and read the after visit summary for emergency contact information. The patient was counseled that [employer] messaging response may be delayed by several days, therefore, phone is preferred for emergency issues.     The patient will follow-up in 2 months.    Dr. Garcia staffed the patient was present for calibration of laser,        Staff Involved:  Staff Only    Scribe Disclosure:   Nuvia CONRAD, am serving as a scribe; to document services personally  performed by Alexander Garcia MD -based on data collection and the provider's statements to me.     Attending attestation:  I was not present for key elements of the procedure but was immediately available for all portions of the procedure.  I reviewed and approved plan/settings.  I have reviewed the note and edited it as necessary.    Aelxander Garcia M.D.  Professor  Director of Dermatologic Surgery  Department of Dermatology  [facility]    Dermatology Surgery Clinic  [facility] [place] [address]          Again, thank you for allowing me to participate in the care of your patient.      Sincerely,    Alexander Garcia MD

## 2025-07-21 NOTE — PROGRESS NOTES
Laser- VBeam(Pulsed Dye Laser) Procedure Note: Medical      Dermatology Problem List:  1. Lesion to monitor  - left upper cutaneous lip, 1mm pearly papule -- biopsied 10/10/24, r/o NMSC  - R nasal tip, 3mm light brown macule with inferior erythema, c/f early BCC  2. Hx of NMSC, taking niacinamide  - early BCC (not biopsied), L calf, s/p Imiquimod 24  - sfBCC, R proximal anterior thigh s/p imiquimod 3/16/23  - sfBCC, L distal lateral thigh s/p imiquimod 3/16/23  - sfBCC, Right thigh s/p imiquimod 3/16/23  - superficial BCC, L ankle s/p bx 21 treated with Aldara   - superficial BCC, R anterior shin s/p bx 21 treated with Aldara  - BCC, R medial lower leg, s/p Mohs 20  - BCC, R medial calf, s/p Mohs 20  - BCC, L shin medial, s/p ED&C 19  - BCC, L shin lateral, s/p ED&C 19  - BCC, L upper chest status post ED&C  - BCC, abdomen  - BCC, R flank  - History: grew up in [place], no history of radiation, no known well-water history use, family with small numbers of BCCs not at the number she gets   3. Tinea pedis  - current tx: econazole cream  4. Hx dysplastic nevus  - mod, central upper back s/p exc 2016  5. AK, LN2      Procedure Date: 2025    Attending Staff Surgeon: Jose    Resident Surgeon: none    Assistant: none    Operating Room Data:     Surgery/Procedure Date:    SAME     Pre-operative Diagnosis:   Scar  Location: left upper cutaneous lip   Size(cm2): 3  Number of lesions: 1    Operation/Procedure    Vbeam pulsed dye laser treatment#: 1       Post-operative Diagnosis:  SAME    Laser Settings:  Energy:7 J/cm2  Spot size:10mm  Pulse width:  10 mS (0.45 thru 40 mS)  Dynamic cooling spray settin mS  Dynamic cooling device delay:  20 mS      Fotofinder photos: No    Anesthesia:  None    Description of Operation/Procedure:   The nature and purpose of the procedure, associated risks, possible consequences, complications and alternative methods of treatment were  explained in detail, this includes but is not limited to hyperpigmentation, hypopigmentation, scarring, bruising, hair loss pain/discomfort, eye injury,  and blister. We reviewed that the outcome could be any of the following: no improvement, slight improvement or change in skin color & texture, the skin might be permanently lighter or darker, and though uncommon, superficial scarring may occur.  Multiple treatments may be recommended.   A photo and operative consent were obtained. Time-out was performed.The patient was positioned to optimally expose the area treated. Protective eyewear was worn by the patient and goggles on all personnel in the treatment room. The patient confirmed the site to be treated. The laser energy output was verified by meter reading.      The clinically evident lesion(s) was/were treated with Krupa Vbeam pulsed dye laser (595 nm) beam as above. A total of 6 pulses were used. The patient tolerated the procedure well and no complications were noted. Post operative instructions were provided. The total laser operation and preparation time was 2 minutes.  The patient was counseled to call immediately for any issues and read the after visit summary for emergency contact information. The patient was counseled that [employer] messaging response may be delayed by several days, therefore, phone is preferred for emergency issues.     The patient will follow-up in 2 months.    Dr. Garcia staffed the patient was present for calibration of laser,        Staff Involved:  Staff Only    Scribe Disclosure:   I, Nuvia Gee, am serving as a scribe; to document services personally performed by Alexander Garcia MD -based on data collection and the provider's statements to me.     Attending attestation:  I was not present for key elements of the procedure but was immediately available for all portions of the procedure.  I reviewed and approved plan/settings.  I have reviewed the note and edited it as  necessary.    Alexander Garcia M.D.  Professor  Director of Dermatologic Surgery  Department of Dermatology  [facility]    Dermatology Surgery Clinic  [facility] [place] [phone]

## 2025-07-21 NOTE — NURSING NOTE
Chief Complaint   Patient presents with    Scar Management     PDL earl Real RN-BSN  Dermatology Surgery      Dermatology Laser Intake Checklist:  History of psoriasis: No  History of recent tan, indoor or outdoor tanning/vacation or other sun exposure: No  History of vitiligo: No  Family history of vitiligo: No  Recent other cosmetic procedure(microderm abrasion/peel/hair removal/facial etc): No  History of HSV: Yes  Did the patient start valtrex: No  For genital laser hair removal patient only: Is there a history of genital warts or condyloma: No  Tattoo in the area to be treated: No  Is patient using hydroquinone: No  Retinoids and other acne medications stopped for 2 weeks: No  Has the patient had accutane in the last 6-12 months: No  Pregnant or breastfeeding: No  History of skin cancer in area planned for treatment: Yes  History of treatment with gold: No  Changes in medical history: No  Photos obtained: Yes  Does the patient smoke: No  Is the patient on ibuprofen/aspirin/plavix/coumadin/other blood thinner: No  If patient is taking narcotic or diazepam(valium)-does patient have : No  There were no vitals taken for this visit.

## 2025-07-24 ENCOUNTER — ANCILLARY PROCEDURE (OUTPATIENT)
Dept: MRI IMAGING | Facility: CLINIC | Age: 55
End: 2025-07-24
Attending: INTERNAL MEDICINE
Payer: COMMERCIAL

## 2025-07-24 DIAGNOSIS — K76.9 LIVER LESION: ICD-10-CM

## 2025-07-24 PROCEDURE — 74183 MRI ABD W/O CNTR FLWD CNTR: CPT | Mod: TC | Performed by: RADIOLOGY

## 2025-07-24 PROCEDURE — A9581 GADOXETATE DISODIUM INJ: HCPCS | Performed by: RADIOLOGY

## 2025-07-28 ENCOUNTER — TRANSFERRED RECORDS (OUTPATIENT)
Dept: ADMINISTRATIVE | Facility: CLINIC | Age: 55
End: 2025-07-28
Payer: COMMERCIAL

## 2025-07-29 NOTE — PROGRESS NOTES
2025        Shannan Harris   [address]  [place] [address]      Shannan Harris,  :  1970    Shannan,     Your recent MRI shows not changes compared to your 2025 MRI. It is still labeled an indeterminate liver lesion. We will repeat the MRI in 6 months. At that time we will need to discuss how we want to move forward as an MRI every 6 months in time intensive and costs money. If we need too continue to do them I have no problem doing so.      Thank you.    Electronically signed by:  Tarun Mariscal DO 2025 03:09 PM  Document generated by:  Tarun Mariscal DO  2025  If your provider ordered multiple tests; the results may not become available at the same time.  If multiple test results are received within 14 days of one another, you may receive a duplicate.  cc:  Ginna Duong MD

## 2025-08-28 ENCOUNTER — OFFICE VISIT (OUTPATIENT)
Dept: OTOLARYNGOLOGY | Facility: CLINIC | Age: 55
End: 2025-08-28
Payer: COMMERCIAL

## 2025-08-28 VITALS — HEART RATE: 72 BPM | DIASTOLIC BLOOD PRESSURE: 71 MMHG | OXYGEN SATURATION: 99 % | SYSTOLIC BLOOD PRESSURE: 109 MMHG

## 2025-08-28 DIAGNOSIS — Z98.890 STATUS POST NASAL SEPTOPLASTY: ICD-10-CM

## 2025-08-28 DIAGNOSIS — L91.0 HYPERTROPHIC SCAR: Primary | ICD-10-CM

## 2025-08-28 RX ORDER — TRIAMCINOLONE ACETONIDE 40 MG/ML
20 INJECTION, SUSPENSION INTRA-ARTICULAR; INTRAMUSCULAR ONCE
Status: ACTIVE | OUTPATIENT
Start: 2025-08-28

## 2025-09-09 ENCOUNTER — PRE VISIT (OUTPATIENT)
Dept: PLASTIC SURGERY | Facility: CLINIC | Age: 55
End: 2025-09-09
Payer: COMMERCIAL

## (undated) DEVICE — SOL NACL 0.9% IRRIG 500ML BOTTLE 2F7123

## (undated) DEVICE — ESU COBLATOR REFLEX ULTRA 45DEG W/CABLE EICA4845-01

## (undated) DEVICE — TUBING SUCTION MEDI-VAC 1/4"X20' N620A

## (undated) DEVICE — WAND COBLATION TURBINATOR RDC ARIS 72290113

## (undated) DEVICE — SYR 10ML FINGER CONTROL W/O NDL 309695

## (undated) DEVICE — SPONGE COTTONOID 1/2X3" 80-1407

## (undated) DEVICE — NDL 25GA 2"  8881200441

## (undated) DEVICE — GLOVE BIOGEL PI ULTRATOUCH G SZ 8.0 42180

## (undated) DEVICE — SYR BULB IRRIG DOVER 60 ML LATEX FREE 67000

## (undated) DEVICE — LINEN TOWEL PACK X5 5464

## (undated) DEVICE — SPONGE COTTONOID 2X1/2" 80-1406

## (undated) DEVICE — PACK ENT ENDOSCOPY CUSTOM ASC

## (undated) DEVICE — SYR 05ML LL W/O NDL

## (undated) DEVICE — ESU PENCIL SMOKE EVAC W/ROCKER SWITCH 0703-047-000

## (undated) DEVICE — SU CHROMIC 4-0 PS-2 18" 1637G

## (undated) DEVICE — SOL WATER IRRIG 1000ML BOTTLE 07139-09

## (undated) DEVICE — BLADE KNIFE SURG 15 371115

## (undated) RX ORDER — ESMOLOL HYDROCHLORIDE 10 MG/ML
INJECTION INTRAVENOUS
Status: DISPENSED
Start: 2025-06-06

## (undated) RX ORDER — FENTANYL CITRATE-0.9 % NACL/PF 10 MCG/ML
PLASTIC BAG, INJECTION (ML) INTRAVENOUS
Status: DISPENSED
Start: 2025-06-06

## (undated) RX ORDER — EPINEPHRINE NASAL SOLUTION 1 MG/ML
SOLUTION NASAL
Status: DISPENSED
Start: 2025-06-06

## (undated) RX ORDER — FENTANYL CITRATE 50 UG/ML
INJECTION, SOLUTION INTRAMUSCULAR; INTRAVENOUS
Status: DISPENSED
Start: 2025-06-06

## (undated) RX ORDER — LABETALOL HYDROCHLORIDE 5 MG/ML
INJECTION, SOLUTION INTRAVENOUS
Status: DISPENSED
Start: 2025-06-06

## (undated) RX ORDER — TRIAMCINOLONE ACETONIDE 40 MG/ML
INJECTION, SUSPENSION INTRA-ARTICULAR; INTRAMUSCULAR
Status: DISPENSED
Start: 2025-02-12

## (undated) RX ORDER — MUPIROCIN 2 %
OINTMENT (GRAM) TOPICAL
Status: DISPENSED
Start: 2025-06-06

## (undated) RX ORDER — EPINEPHRINE 1 MG/ML
INJECTION, SOLUTION INTRAMUSCULAR; SUBCUTANEOUS
Status: DISPENSED
Start: 2025-06-06

## (undated) RX ORDER — OXYCODONE HYDROCHLORIDE 5 MG/1
TABLET ORAL
Status: DISPENSED
Start: 2025-06-06

## (undated) RX ORDER — OXYMETAZOLINE HYDROCHLORIDE 0.05 G/100ML
SPRAY NASAL
Status: DISPENSED
Start: 2025-06-06

## (undated) RX ORDER — LIDOCAINE HYDROCHLORIDE AND EPINEPHRINE 10; 10 MG/ML; UG/ML
INJECTION, SOLUTION INFILTRATION; PERINEURAL
Status: DISPENSED
Start: 2025-06-06

## (undated) RX ORDER — ACETAMINOPHEN 325 MG/1
TABLET ORAL
Status: DISPENSED
Start: 2025-06-06

## (undated) RX ORDER — ONDANSETRON 2 MG/ML
INJECTION INTRAMUSCULAR; INTRAVENOUS
Status: DISPENSED
Start: 2025-06-06

## (undated) RX ORDER — HYDROMORPHONE HYDROCHLORIDE 1 MG/ML
INJECTION, SOLUTION INTRAMUSCULAR; INTRAVENOUS; SUBCUTANEOUS
Status: DISPENSED
Start: 2025-06-06